# Patient Record
Sex: FEMALE | Race: BLACK OR AFRICAN AMERICAN | Employment: FULL TIME | URBAN - METROPOLITAN AREA
[De-identification: names, ages, dates, MRNs, and addresses within clinical notes are randomized per-mention and may not be internally consistent; named-entity substitution may affect disease eponyms.]

---

## 2017-12-18 ENCOUNTER — HOSPITAL ENCOUNTER (OUTPATIENT)
Dept: MAMMOGRAPHY | Age: 47
Discharge: HOME | End: 2017-12-18
Attending: OBSTETRICS & GYNECOLOGY | Admitting: OBSTETRICS & GYNECOLOGY

## 2017-12-18 DIAGNOSIS — Z12.31 VISIT FOR SCREENING MAMMOGRAM: ICD-10-CM

## 2019-02-05 ENCOUNTER — HOSPITAL ENCOUNTER (OUTPATIENT)
Dept: WOMENS IMAGING | Age: 49
Discharge: HOME | End: 2019-02-05
Payer: COMMERCIAL

## 2019-02-05 DIAGNOSIS — Z12.39 BREAST CANCER SCREENING: ICD-10-CM

## 2019-02-05 PROCEDURE — 77067 SCR MAMMO BI INCL CAD: CPT

## 2019-11-08 ENCOUNTER — OFFICE VISIT (OUTPATIENT)
Dept: SURGERY | Age: 49
End: 2019-11-08
Payer: COMMERCIAL

## 2019-11-08 VITALS
SYSTOLIC BLOOD PRESSURE: 118 MMHG | DIASTOLIC BLOOD PRESSURE: 80 MMHG | WEIGHT: 250 LBS | BODY MASS INDEX: 37.89 KG/M2 | HEIGHT: 68 IN

## 2019-11-08 DIAGNOSIS — L73.2 RIGHT AXILLARY HIDRADENITIS: Primary | ICD-10-CM

## 2019-11-08 PROCEDURE — 99213 OFFICE O/P EST LOW 20 MIN: CPT | Performed by: SURGERY

## 2019-11-08 RX ORDER — CLINDAMYCIN HYDROCHLORIDE 300 MG/1
300 CAPSULE ORAL 3 TIMES DAILY
COMMUNITY
End: 2021-06-09

## 2019-11-08 RX ORDER — CEPHALEXIN 500 MG/1
500 CAPSULE ORAL 3 TIMES DAILY
Qty: 21 CAPSULE | Refills: 3 | Status: SHIPPED | OUTPATIENT
Start: 2019-11-08 | End: 2019-11-15

## 2019-11-08 RX ORDER — GLIPIZIDE 2.5 MG/1
2.5 TABLET, EXTENDED RELEASE ORAL DAILY
COMMUNITY
End: 2021-06-09

## 2019-11-08 ASSESSMENT — ENCOUNTER SYMPTOMS
GASTROINTESTINAL NEGATIVE: 1
EYES NEGATIVE: 1
RESPIRATORY NEGATIVE: 1
ALLERGIC/IMMUNOLOGIC NEGATIVE: 1

## 2020-12-17 ENCOUNTER — HOSPITAL ENCOUNTER (OUTPATIENT)
Dept: WOMENS IMAGING | Age: 50
Discharge: HOME | End: 2020-12-17
Payer: COMMERCIAL

## 2020-12-17 PROCEDURE — 77063 BREAST TOMOSYNTHESIS BI: CPT

## 2021-01-05 ENCOUNTER — HOSPITAL ENCOUNTER (OUTPATIENT)
Dept: WOMENS IMAGING | Age: 51
Discharge: HOME | End: 2021-01-05
Payer: COMMERCIAL

## 2021-01-05 ENCOUNTER — HOSPITAL ENCOUNTER (OUTPATIENT)
Dept: ULTRASOUND IMAGING | Age: 51
End: 2021-01-05
Payer: COMMERCIAL

## 2021-01-05 DIAGNOSIS — R92.8 ABNORMAL MAMMOGRAM: ICD-10-CM

## 2021-01-05 PROCEDURE — G0279 TOMOSYNTHESIS, MAMMO: HCPCS

## 2021-02-10 ENCOUNTER — APPOINTMENT (OUTPATIENT)
Dept: GENERAL RADIOLOGY | Age: 51
End: 2021-02-10
Payer: COMMERCIAL

## 2021-02-10 ENCOUNTER — HOSPITAL ENCOUNTER (EMERGENCY)
Age: 51
Discharge: HOME | End: 2021-02-10
Payer: COMMERCIAL

## 2021-02-10 VITALS
DIASTOLIC BLOOD PRESSURE: 63 MMHG | TEMPERATURE: 98.3 F | OXYGEN SATURATION: 97 % | SYSTOLIC BLOOD PRESSURE: 106 MMHG | RESPIRATION RATE: 18 BRPM | HEART RATE: 79 BPM

## 2021-02-10 DIAGNOSIS — S93.402A SPRAIN OF LEFT ANKLE, UNSPECIFIED LIGAMENT, INITIAL ENCOUNTER: ICD-10-CM
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
## 2021-02-10 PROCEDURE — 73562 X-RAY EXAM OF KNEE 3: CPT

## 2021-02-10 PROCEDURE — 99284 EMERGENCY DEPT VISIT MOD MDM: CPT

## 2021-02-10 PROCEDURE — 73610 X-RAY EXAM OF ANKLE: CPT

## 2021-02-10 PROCEDURE — 6370000000 HC RX 637 (ALT 250 FOR IP): Performed by: PHYSICIAN ASSISTANT

## 2021-02-10 RX ORDER — HYDROCODONE BITARTRATE AND ACETAMINOPHEN 5; 325 MG/1; MG/1
2 TABLET ORAL ONCE
Status: COMPLETED | OUTPATIENT
Start: 2021-02-10 | End: 2021-02-10

## 2021-02-10 RX ORDER — ONDANSETRON 4 MG/1
4 TABLET, ORALLY DISINTEGRATING ORAL ONCE
Status: COMPLETED | OUTPATIENT
Start: 2021-02-10 | End: 2021-02-10

## 2021-02-10 RX ADMIN — ONDANSETRON 4 MG: 4 TABLET, ORALLY DISINTEGRATING ORAL at 10:36

## 2021-02-10 RX ADMIN — HYDROCODONE BITARTRATE AND ACETAMINOPHEN 2 TABLET: 5; 325 TABLET ORAL at 10:36

## 2021-02-10 ASSESSMENT — PAIN SCALES - GENERAL
PAINLEVEL_OUTOF10: 9
PAINLEVEL_OUTOF10: 9

## 2021-02-10 ASSESSMENT — ENCOUNTER SYMPTOMS
NAUSEA: 0
VOMITING: 0

## 2021-02-10 ASSESSMENT — PAIN DESCRIPTION - LOCATION: LOCATION: LEG

## 2021-02-10 NOTE — ED PROVIDER NOTES
[address] [facility]        Pt Name: Kristian Moreno  MRN: [id number]  [place] 1970  Date of evaluation: 2/10/2021  Provider: Dorian Magdaleno PA-C  PCP: Rehana Hough MD    BENITA. I have evaluated this patient. My supervising physician was available for consultation. 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 Twisted left ankle; pain from left ankle to left knee. Tingling & burning to site. Denies hitting her head; not on blood thinners        HISTORY OF PRESENT ILLNESS   (Location, Timing/Onset, Context/Setting, Quality, Duration, Modifying Factors, Severity, Associated Signs and Symptoms)  Note limiting factors. Kristian Moreno is a 45 y.o. female who presents to the emergency department today for evaluation for a fall which occurred shortly before arriving to the ED. The patient states that she was carrying a box, and she states that she slipped on ice, fell and twisted her left ankle. The patient states that she attempted to get up and then fell on her left knee. T the patient is complaining of most of her pain to her left ankle which she is rating as a 9/10. States that her pain is constant, is worse with touch and certain movements. The patient reports minimal pain to the knee. The patient states that she has barely been able to walk on the ankle secondary to pain. She has no numbness, tingling or weakness. The patient denies feeling dizzy, lightheaded, having chest pain or shortness of breath before her fall, her fall was mechanical in nature. The patient is not on any blood thinners. She did not hit her head, no loss of consciousness. No vomiting. No wounds or other complaints at this time    Nursing Notes were all reviewed and agreed with or any disagreements were addressed in the HPI.     REVIEW OF SYSTEMS (2-9 systems for level 4, 10 or more for level 5)     Review of Systems   Constitutional: Negative for activity change, appetite change and fever. Gastrointestinal: Negative for nausea and vomiting. Musculoskeletal: Positive for arthralgias. Skin: Negative for wound. Neurological: Negative for weakness and numbness. Positives and Pertinent negatives as per HPI. Except as noted above in the ROS, all other systems were reviewed and negative.        PAST MEDICAL HISTORY     Past Medical History:   Diagnosis Date    Diabetes [facility])          SURGICAL HISTORY     Past Surgical History:   Procedure Laterality Date    BREAST REDUCTION SURGERY       SECTION  80    CHOLECYSTECTOMY      OTHER SURGICAL HISTORY  2015    EXCISION OF HYDRADENITIS RIGHT AXILLA          [address]       Discharge Medication List as of 2/10/2021 12:38 PM      CONTINUE these medications which have NOT CHANGED    Details   metFORMIN (GLUCOPHAGE) 500 MG tablet Take 500 mg by mouth 2 times daily (with meals)Historical Med      aspirin 81 MG tablet Take 81 mg by mouth dailyHistorical Med      glipiZIDE (GLUCOTROL XL) 2.5 MG extended release tablet Take 2.5 mg by mouth dailyHistorical Med      ATORVASTATIN CALCIUM PO Take by mouthHistorical Med      VITAMIN D PO Take by mouthHistorical Med      Insulin Regular Human (NOVOLIN R IJ) Inject as directedHistorical Med      clindamycin (CLEOCIN) 300 MG capsule Take 300 mg by mouth 3 times dailyHistorical Med      minocycline (MINOCIN;DYNACIN) 100 MG capsule Take 100 mg by mouth 2 times daily      docusate sodium (COLACE) 100 MG capsule Take 100 mg by mouth 2 times daily      cephALEXin (KEFLEX) 500 MG capsule Take 1 capsule by mouth 3 times daily, Disp-21 capsule, R-0      Canagliflozin-Metformin HCl (INVOKAMET PO) Take by mouth      SitaGLIPtin-MetFORMIN HCl (JANUMET PO) Take by mouth               ALLERGIES     Diflucan [fluconazole] and Zithromax [azithromycin] Mental Status: She is alert and oriented to person, place, and time. Psychiatric:         Behavior: Behavior normal.         DIAGNOSTIC RESULTS   LABS:    Labs Reviewed - No data to display    All other labs were within normal range or not returned as of this dictation. EKG: All EKG's are interpreted by the Emergency Department Physician in the absence of a cardiologist.  Please see their note for interpretation of EKG. RADIOLOGY:   Non-plain film images such as CT, Ultrasound and MRI are read by the radiologist. Plain radiographic images are visualized and preliminarily interpreted by the ED Provider with the below findings:        Interpretation per the Radiologist below, if available at the time of this note:    XR ANKLE LEFT (MIN 3 VIEWS)   Final Result   Soft tissue swelling with tiny ossific density along the posterior aspect of   the distal tibia which may represent a tiny avulsed fracture fragment of the   posterior malleolus. Clinical correlation for focal point tenderness at this   site recommended. XR KNEE LEFT (3 VIEWS)   Preliminary Result   Small ossific density adjacent to the posterior margin of the tibial plateau   is nonspecific but could represent a chip or avulsion-type fracture in the   appropriate clinical setting. If there is clinical concern for internal   derangement consider further evaluation with MRI. No results found.         PROCEDURES   Unless otherwise noted below, none     Procedures    CRITICAL CARE TIME   N/A    CONSULTS:  None      EMERGENCY DEPARTMENT COURSE and DIFFERENTIAL DIAGNOSIS/MDM:   Vitals:    Vitals:    02/10/21 1018 02/10/21 1246   BP: 130/88 106/63   Pulse: 94 79   Resp: 18    Temp: 98.3 °F (36.8 °C)    TempSrc: Oral    SpO2: 100% 97%       Patient was given the following medications:  Medications   HYDROcodone-acetaminophen (NORCO) 5-325 MG per tablet 2 tablet (2 tablets Oral Given 2/10/21 1036) ondansetron (ZOFRAN-ODT) disintegrating tablet 4 mg (4 mg Oral Given 2/10/21 1036)           Briefly, this is a 58-year-old female who presents to the emergency department today for evaluation for a fall which occurred before arriving to the ED. Mechanical, slipped and ice. Complaining of pain to left ankle and left    On examination she does have tenderness and edema noted to the left ankle, no significant tenderness over the foot or the knee. She is neurovascularly intact    X-ray of left knee she has no acute process    Xray of left ankle was obtained and she has a soft tissue swelling with a tiny ossific density along the posterior aspect of the distal tibia which may represent a tiny avulsed fracture fragment of the posterior malleolus otherwise is unremarkable. Patient has no tenderness over this area, most of her tenderness is to the lateral malleolus. Shared medical decision making with the patient she is overall feeling better and she does elect for an Aircast, denies wanting crutches will refer to orthopedics if there is no improvement within the next week. She is to return to the ED for any new or worsening symptoms. Supportive care discussed at home. Patient voiced understanding is agreeable with plan. Stable for discharge. My suspicions at this time for occult fracture, dislocation, subluxation, arterial occlusion, septic arthritis or other emergent etiology      FINAL IMPRESSION      1. Fall, initial encounter    2.  Sprain of left ankle, unspecified ligament, initial encounter          DISPOSITION/PLAN   DISPOSITION Decision To Discharge 02/10/2021 12:35:07 PM      PATIENT REFERREDTO:  Reva Downs MD  [address] [place] [address]  [phone]    Schedule an appointment as soon as possible for a visit in 1 week  As needed    [facility] Emergency Department  [address] [facility]  [phone]    As needed, If symptoms worsen DISCHARGE MEDICATIONS:  Discharge Medication List as of 2/10/2021 12:38 PM          DISCONTINUED MEDICATIONS:  Discharge Medication List as of 2/10/2021 12:38 PM                 (Please note that portions of this note were completed with a voice recognition program.  Efforts were made to edit the dictations but occasionally words are mis-transcribed.)    Maureen Mckeon PA-C (electronically signed)            Maureen Mckeon PA-C  02/10/21 7133

## 2021-02-10 NOTE — ED NOTES
Pt presented to the ED reporting fall today in a parking lot. Pt denies hitting head, however reports that her leg went behind her, twisting her ankle and she landed on her knee. Imaging performed and patient mediated as ordered. Pt placed in a air boot as prescribed. Writer discussed discharge instructions as provided by ED provider. Denies further questions. Pt to leave ED in stable condition.      Jeremy Oscar RN  02/10/21 7869

## 2021-02-16 ENCOUNTER — OFFICE VISIT (OUTPATIENT)
Dept: ORTHOPEDIC SURGERY | Age: 51
End: 2021-02-16
Payer: COMMERCIAL

## 2021-02-16 VITALS
HEIGHT: 68 IN | TEMPERATURE: 97.3 F | DIASTOLIC BLOOD PRESSURE: 69 MMHG | WEIGHT: 250 LBS | BODY MASS INDEX: 37.89 KG/M2 | SYSTOLIC BLOOD PRESSURE: 117 MMHG

## 2021-02-16 DIAGNOSIS — S93.492A SPRAIN OF ANTERIOR TALOFIBULAR LIGAMENT OF LEFT ANKLE, INITIAL ENCOUNTER: Primary | ICD-10-CM

## 2021-02-16 DIAGNOSIS — S80.02XA CONTUSION OF LEFT KNEE, INITIAL ENCOUNTER: ICD-10-CM

## 2021-02-16 PROCEDURE — 99203 OFFICE O/P NEW LOW 30 MIN: CPT | Performed by: ORTHOPAEDIC SURGERY

## 2021-02-17 PROBLEM — S93.492A SPRAIN OF ANTERIOR TALOFIBULAR LIGAMENT OF LEFT ANKLE: Status: ACTIVE | Noted: 2021-02-17

## 2021-02-17 PROBLEM — S80.02XA CONTUSION OF LEFT KNEE: Status: ACTIVE | Noted: 2021-02-17

## 2021-02-17 NOTE — PROGRESS NOTES
CHIEF COMPLAINT:   1- Left ankle pain/ Ankle ATF sprain. 2- Left knee pain/ contusion. DATE OF INJURY: 2/10/2021, Worker's Comp    HISTORY:  Ms. Sara Troy 45 y.o.  female presents today for the first visit for evaluation of a left ankle and knee injury which occurred when she was walking at work parking lot and slipped and fell on her left knee and ankle. She is complaining of lateral ankle and anterior knee pain. She went to [facility] ER because of the pain. She was told that she has a sprain, splint was applied and asked to f/u with Orthopedics. She reports today moderate pain. Pain increase with walking and decrease with rest and elevation. No numbness or tingling sensation, and no other complaint.       Past Medical History:   Diagnosis Date    Diabetes [facility])         Past Surgical History:   Procedure Laterality Date    BREAST REDUCTION SURGERY       SECTION  80    CHOLECYSTECTOMY      OTHER SURGICAL HISTORY  2015    EXCISION OF HYDRADENITIS RIGHT AXILLA         Social History     Socioeconomic History    Marital status:      Spouse name: Not on file    Number of children: Not on file    Years of education: Not on file    Highest education level: Not on file   Occupational History    Not on file   Social Needs    Financial resource strain: Not on file    Food insecurity     Worry: Not on file     Inability: Not on file    Transportation needs     Medical: Not on file     Non-medical: Not on file   Tobacco Use    Smoking status: Never Smoker    Smokeless tobacco: Never Used   Substance and Sexual Activity    Alcohol use: No    Drug use: No    Sexual activity: Not on file   Lifestyle    Physical activity     Days per week: Not on file     Minutes per session: Not on file    Stress: Not on file   Relationships    Social connections     Talks on phone: Not on file     Gets together: Not on file     Attends Congregation service: Not on file     Active member of club or organization: Not on file     Attends meetings of clubs or organizations: Not on file     Relationship status: Not on file    Intimate partner violence     Fear of current or ex partner: Not on file     Emotionally abused: Not on file     Physically abused: Not on file     Forced sexual activity: Not on file   Other Topics Concern    Not on file   Social History Narrative    Not on file        Family History   Problem Relation Age of Onset    Diabetes Mother     High Blood Pressure Mother     Diabetes Father     Cancer Father         multiple myeloma    High Blood Pressure Father         Pertinent items are noted in HPI  Review of systems reviewed from Patient History Form dated on 2/16/2021 and available in the patient's chart under the Media tab. No change. PHYSICAL EXAM:  Ms. Chloé Collier is a very pleasant 45 y.o.  female who presents today in no acute distress, awake, alert, and oriented. She is well dressed, nourished and  groomed. Patient with normal affect. Height is  5' 8\" (1.727 m), weight is 250 lb (113.4 kg). Resting respiratory rate is 16. Examination of the gait, showed that the patient walks with a limp, PWB left leg in a boot. Examination of both ankles showing a decreased range of motion of the left ankle and knee compare to the other side because of pain. There is mild swelling that can be seen, as well as ecchymosis over lateral side of the left ankle. She has intact sensation and good pedal pulses. She has significant tenderness on deep palpation over the left ankle ATF. The ankles are stable to drawer test bilaterally, equally.  Ankle reflex 1+ bilaterally. IMAGING:  Xray's dated 2/10/2021 from [address] [place] [address],  were reviewed, 3 views of the left ankle and knee, and showed no acute fracture. Ankle mortise in anatomic position. IMPRESSION:    1- Left ankle pain/ Ankle ATF sprain. 2- Left knee pain/ contusion.     PLAN:  I assured the patient that the xray is negative for acute fracture. The xray findings were reviewed with the patient and explained to her that the pain is 2ry to ankle sprain and knee contusion, and that it should continue to improve the next 3-4 weeks. She can be WBAT out of the boot, and avoid heavy impact acitivity and work on peroneal and Quad muscle strength. F/U in 3 weeks, PT if needed.        Jw Zamarripa MD

## 2021-03-09 ENCOUNTER — OFFICE VISIT (OUTPATIENT)
Dept: ORTHOPEDIC SURGERY | Age: 51
End: 2021-03-09
Payer: COMMERCIAL

## 2021-03-09 VITALS
WEIGHT: 250 LBS | BODY MASS INDEX: 37.89 KG/M2 | HEIGHT: 68 IN | TEMPERATURE: 98.4 F | DIASTOLIC BLOOD PRESSURE: 72 MMHG | HEART RATE: 66 BPM | SYSTOLIC BLOOD PRESSURE: 129 MMHG

## 2021-03-09 DIAGNOSIS — S80.02XA CONTUSION OF LEFT KNEE, INITIAL ENCOUNTER: ICD-10-CM

## 2021-03-09 DIAGNOSIS — S93.492A SPRAIN OF ANTERIOR TALOFIBULAR LIGAMENT OF LEFT ANKLE, INITIAL ENCOUNTER: Primary | ICD-10-CM

## 2021-03-09 PROCEDURE — 99214 OFFICE O/P EST MOD 30 MIN: CPT | Performed by: ORTHOPAEDIC SURGERY

## 2021-03-09 NOTE — PROGRESS NOTES
CHIEF COMPLAINT:   1- Left ankle pain/ Ankle ATF sprain. 2- Left knee pain/ contusion. DATE OF INJURY: 2/10/2021, Worker's Comp    HISTORY:  Ms. Lacho Cisneros 45 y.o.  female presents today for follow-up visit for evaluation of a left ankle and knee injury which occurred when she was walking at work parking lot and slipped and fell on her left knee and ankle. She is complaining of lateral ankle and anterior knee pain and states that is somewhat better. She states pain is worse when walking down steps and improves with ice. She went to [facility] ER because of the pain. She was told that she has a sprain, splint was applied and asked to f/u with Orthopedics. She reports today mild to moderate pain. Pain increase with walking and decrease with rest and elevation. Treatment to date has consisted of rest, ice. She has had no formal physical therapy. No numbness or tingling sensation, and no other complaint. She has been back to work full duty.     Past Medical History:   Diagnosis Date    Diabetes [facility])         Past Surgical History:   Procedure Laterality Date    BREAST REDUCTION SURGERY       SECTION  80    CHOLECYSTECTOMY      OTHER SURGICAL HISTORY  2015    EXCISION OF HYDRADENITIS RIGHT AXILLA         Social History     Socioeconomic History    Marital status:      Spouse name: Not on file    Number of children: Not on file    Years of education: Not on file    Highest education level: Not on file   Occupational History    Not on file   Social Needs    Financial resource strain: Not on file    Food insecurity     Worry: Not on file     Inability: Not on file    Transportation needs     Medical: Not on file     Non-medical: Not on file   Tobacco Use    Smoking status: Never Smoker    Smokeless tobacco: Never Used   Substance and Sexual Activity    Alcohol use: No    Drug use: No    Sexual activity: Not on file   Lifestyle    Physical activity     Days per week: Not on file     Minutes per session: Not on file    Stress: Not on file   Relationships    Social connections     Talks on phone: Not on file     Gets together: Not on file     Attends Orthodox service: Not on file     Active member of club or organization: Not on file     Attends meetings of clubs or organizations: Not on file     Relationship status: Not on file    Intimate partner violence     Fear of current or ex partner: Not on file     Emotionally abused: Not on file     Physically abused: Not on file     Forced sexual activity: Not on file   Other Topics Concern    Not on file   Social History Narrative    Not on file        Family History   Problem Relation Age of Onset    Diabetes Mother     High Blood Pressure Mother     Diabetes Father     Cancer Father         multiple myeloma    High Blood Pressure Father         Pertinent items are noted in HPI  Review of systems reviewed from Patient History Form dated on 2/16/2021 and available in the patient's chart under the Media tab. No change. PHYSICAL EXAM:  Ms. Kael Hung is a very pleasant 45 y.o.  female who presents today in no acute distress, awake, alert, and oriented. She is well dressed, nourished and  groomed. Patient with normal affect. Height is  5' 8\" (1.727 m), weight is 250 lb (113.4 kg). Resting respiratory rate is 16. Examination of the gait, showed that the patient walks with no limp, WB left leg. Examination of both ankles showing a full range of motion of the left ankle and full range of motion left knee compare to the other side. There is mild to no swelling that can be seen, no ecchymosis over lateral side of the left ankle. She has intact sensation and good pedal pulses. She has no tenderness on deep palpation over the left ankle ATF. The ankles are stable to drawer test bilaterally, equally.  Ankle reflex 1+ bilaterally. She has no tenderness to palpation over the left knee.   Knee is stable to valgus and varus stress. IMAGING:  Xray's dated 2/10/2021 from [facility],  were reviewed, 3 views of the left ankle and knee, and showed no acute fracture. Ankle mortise in anatomic position. IMPRESSION:    1- Left ankle pain/ Ankle ATF sprain. 2- Left knee pain/ contusion. PLAN:  I assured the patient that the xray is negative for acute fracture. The xray findings were reviewed with the patient and explained to her that the pain is 2ry to ankle sprain and knee contusion, and that it should continue to improve. She can be WBAT  and gradually return to normal acitivity and work on peroneal and Quad muscle strength. I discussed with the patient that I think that she would really benefit from a course of physical therapy for further strengthening and stretching. An Rx for physical therapy was given to the patient. F/U in 6 weeks. MRI if needed.        Price Saini MD

## 2021-03-18 ENCOUNTER — HOSPITAL ENCOUNTER (OUTPATIENT)
Dept: PHYSICAL THERAPY | Age: 51
Setting detail: THERAPIES SERIES
Discharge: HOME | End: 2021-03-18
Payer: COMMERCIAL

## 2021-03-18 PROCEDURE — 97110 THERAPEUTIC EXERCISES: CPT

## 2021-03-18 PROCEDURE — 97530 THERAPEUTIC ACTIVITIES: CPT

## 2021-03-18 PROCEDURE — 97161 PT EVAL LOW COMPLEX 20 MIN: CPT

## 2021-03-18 NOTE — PLAN OF CARE
Cesar, [facility], [address]  Phone: [phone]   Fax: [phone]                                                       Physical Therapy Certification    Dear Referring Practitioner: Solange Goodson MD,    We had the pleasure of evaluating the following patient for physical therapy services at [address] [place] [address]. A summary of our findings can be found in the initial assessment below. This includes our plan of care. If you have any questions or concerns regarding these findings, please do not hesitate to contact me at the office phone number checked above. Thank you for the referral.       Physician Signature:_______________________________Date:__________________  By signing above (or electronic signature), therapists plan is approved by physician      Patient: Fabián Rodriguez   : 1970   MRN: [id number]  Referring Physician: Referring Practitioner: Solange Goodson MD      Evaluation Date: 3/18/2021      Medical Diagnosis Information:  Diagnosis: T33.585A (ICD-10-CM) - Sprain of anterior talofibular ligament of left ankle, initial encounter S80. 02XA (ICD-10-CM) - Contusion of left knee, initial encounter   Treatment Diagnosis: Left knee pain, Left ankle pain, decreased ROM, Decreased strength                                         Insurance information: PT Insurance Information: Workers comp [employer] and [employer] [place]     Precautions/ Contra-indications: DM  Latex Allergy:  [x]NO      []YES  Preferred Language for Healthcare:   [x]English       []Other:    C-SSRS Triggered by Intake questionnaire (Past 2 wk assessment ):   [x] No, Questionnaire did not trigger screening.   [] Yes, Patient intake triggered C-SSRS Screening     [] Completed, no further action required.    [] Completed, PCP notified via Epic    SUBJECTIVE: Patient stated complaint: Pt states that she suffered a fall back in February that resulted in pt hitting her knee, upon trying to get up pt fell again and twisted her ankle in the process. Pt reports stiffness in the morning but starts to resolve after moving it and walking dog. Pt reports the pain is localized and does not travel past ankle or knee. Pain is described as aching in joints with stiffness and sharp occasionally. Pt states going down the stairs is particularly bothersome. Relevant Medical History: DM  Functional Outcome: LEFS: raw score =58 ; dysfunction = 27.5%    Pain Scale:2/10  Easing factors: Ice, rest, tylenol, ibuprofen   Provocative factors:  Movement, deep flexion      Type: []Constant   [x]Intermittent  []Radiating [x]Localized []other:     Numbness/Tingling: denies    Occupation/School:  Case management sitting at desk     Living Status/Prior Level of Function:Prior to this injury / incident, pt was independent with ADLs and IADLs,      OBJECTIVE:   Palpation: presence of swelling in L ankle, L knee tender to palpation and during MMT    Bandages/Dressings/Incisions: N/A    Gait: (include devices/WB status) Presents with toe out navicular drop bilaterally, valgus knee, lateral hip sway    Dermatomes Normal Abnormal Comments   inguinal area (L1)       anterior mid-thigh (L2)      distal ant thigh/med knee (L3)  x Increased on L   medial lower leg and foot (L4) x     lateral lower leg and foot (L5) x     posterior calf (S1) x     medial calcaneus (S2)  x Increased L       Reflexes Normal Abnormal Comments   S1-2 Seated achilles  x diminished   S1-2 Prone knee bend  x diminished   L3-4 Patellar tendon  x diminished   Clonus x     Babinski        Lumbar AROM screen: [] WFL  [] abnormal:     PROM AROM    L R L R   Hip Flexion       Hip Abduction       Hip ER       Hip IR       Knee Flexion   115  127   Knee Extension   1 deg hyperext 2 deg hyperext   Dorsiflexion    4    Plantarflexion    20    Inversion    20    Eversion    11 Strength (0-5) / Myotomes Left Right   Hip Flexion - supine     Hip Flexion - seated (L1-2) 5 5   Hip Abduction     Hip Adduction     Hip ER     Hip IR     Quads (L2-4) 4+ pain 5   Hamstrings 4 discomfort 5   Ankle Dorsiflexion (L4-5) 5 5   Ankle Plantarflexion (S1-2) 5 5   Ankle Inversion     Ankle Eversion (S1-2)     Great Toe Extension (L5)          Flexibility     Hamstrings (90/90)     ITB (Paxton)     Quads (Ely's)     Hip Flexor No [place])     gastroc Lacks 5 deg fromneutral (measured to first tissue barrier)    Girth     Mid patella     Suprapatellar     Figure 8     Transmalleolar     Metatarsal Heads         Joint mobility: L ankle and L knee    []Normal    [x]Hypo   []Hyper    Orthopaedic Special Tests  Positive  Negative  NT Comments    Hip       LIBORIO / Kenny's       FADIR       Scour       Trendelenburg              Knee       Lachman's / Anterior Drawer  x  L knee   Posterior Drawer  x  L knee   Varus Stress  x  L knee   Valgus Stress  x  L knee   Eze's   x  L knee   Appley's       Thessaly's       Patellar Tracking              Ankle       Anterior Drawer       Talar Tilt       Verduzco       Hermilo's                                        [x] Patient history, allergies, meds reviewed. Medical chart reviewed. See intake form. Review Of Systems (ROS):  [x]Performed Review of systems (Integumentary, CardioPulmonary, Neurological) by intake and observation. Intake form has been scanned into medical record. Patient has been instructed to contact their primary care physician regarding ROS issues if not already being addressed at this time.       Co-morbidities/Complexities (which will affect course of rehabilitation):  []None        []Hx of COVID   Arthritic conditions   []Rheumatoid arthritis (M05.9)  []Osteoarthritis (M19.91)  []Gout   Cardiovascular conditions   []Hypertension (I10)  []Hyperlipidemia (E78.5)  []Angina pectoris (I20)  []Atherosclerosis (I70)  []Pacemaker  []Hx of CABG/stent/ cardiac surgeries   Musculoskeletal conditions   []Disc pathology   []Congenital spine pathologies   []Osteoporosis (M81.8)  []Osteopenia (M85.8)  []Scoliosis       Endocrine conditions   []Hypothyroid (E03.9)  []Hyperthyroid Gastrointestinal conditions   []Constipation (E49.66)   Metabolic conditions   []Morbid obesity (E66.01)  [x]Diabetes type 1(E10.65) or 2 (E11.65)   []Neuropathy (G60.9)     Cardio/Pulmonary conditions   []Asthma (J45)  []Coughing   []COPD (J44.9)  []CHF  []A-fib   Psychological Disorders  []Anxiety (F41.9)  []Depression (F32.9)   []Other:   Developmental Disorders  []Autism (F84.0)  []CP (G80)  []Down Syndrome (Q90.9)  []Developmental delay     Neurological conditions  []Prior Stroke (I69.30)  []Parkinson's (G20)  []Encephalopathy (G93.40)  []MS (G35)  []Post-polio (G14)  []SCI  []TBI  []ALS Other conditions  []Fibromyalgia (M79.7)  []Vertigo  []Syncope  []Kidney Failure  []Cancer      []currently undergoing                treatment  []Pregnancy  []Incontinence   Prior surgeries  []involved limb  []previous spinal surgery  [] section birth  []hysterectomy  []bowel / bladder surgery  []other relevant surgeries   []Other:              Barriers to/and or personal factors that will affect rehab potential:              []Age  []Sex    []Smoker              []Motivation/Lack of Motivation                        []Co-Morbidities              []Cognitive Function, education/learning barriers              []Environmental, home barriers              [x]profession/work barriers  []past PT/medical experience  []other:  Justification: Pt is a  that requires her to be seated for multiple hours per day. Falls Risk Assessment (30 days):   [x] Falls Risk assessed and no intervention required.   [] Falls Risk assessed and Patient requires intervention due to being higher risk   TUG score (>12s at risk):     [] Falls education provided, including        ASSESSMENT: Pt presents with L LE pain in both knee and ankle after suffering a fall about 1 month ago when walking and slipped on black ice. Pt has presence of swelling in L ankle and reduced ROM and strength secondary to pain and swelling. Pt presents with reduced AROM in L knee and impaired strength secondary to pain. Pt will benefit from skilled therapy to address deficits and return to previous level of function so that she may navigate stairs without increase in symptoms or compensation. Functional Impairments:     []Noted lumbar/proximal hip/LE joint hypomobility   [x]Decreased LE functional ROM   []Decreased core/proximal hip strength and neuromuscular control   [x]Decreased LE functional strength   []Reduced balance/proprioceptive control   []other:      Functional Activity Limitations (from functional questionnaire and intake)   []Reduced ability to tolerate prolonged functional positions   []Reduced ability or difficulty with changes of positions or transfers between positions   [x]Reduced ability to maintain good posture and demonstrate good body mechanics with sitting, bending, and lifting   [x]Reduced ability to sleep   [] Reduced ability or tolerance with driving and/or computer work   []Reduced ability to perform lifting, carrying tasks   [x]Reduced ability to squat   []Reduced ability to forward bend   [x]Reduced ability to ambulate prolonged functional periods/distances/surfaces   [x]Reduced ability to ascend/descend stairs   [x]Reduced ability to run, hop, cut or jump   []other:    Participation Restrictions   []Reduced participation in self care activities   [x]Reduced participation in home management activities   []Reduced participation in work activities   []Reduced participation in social activities. [x]Reduced participation in sport/recreation activities. Classification :    []Signs/symptoms consistent with post-surgical status including decreased ROM, strength and function.    [x]Signs/symptoms consistent with joint sprain/strain   []Signs/symptoms consistent with patella-femoral syndrome   []Signs/symptoms consistent with knee OA/hip OA   []Signs/symptoms consistent with internal derangement of knee/Hip   []Signs/symptoms consistent with functional hip weakness/NMR control      []Signs/symptoms consistent with tendinitis/tendinosis    []signs/symptoms consistent with pathology which may benefit from Dry needling      [x]other: L ankle sprain, L knee contusion and sprain     Prognosis/Rehab Potential:      []Excellent   [x]Good    []Fair   []Poor    Tolerance of evaluation/treatment:    []Excellent   [x]Good    []Fair   []Poor    Physical Therapy Evaluation Complexity Justification  [x] A history of present problem with:  [] no personal factors and/or comorbidities that impact the plan of care;  [x]1-2 personal factors and/or comorbidities that impact the plan of care  []3 personal factors and/or comorbidities that impact the plan of care  [x] An examination of body systems using standardized tests and measures addressing any of the following: body structures and functions (impairments), activity limitations, and/or participation restrictions;:  [] a total of 1-2 or more elements   [x] a total of 3 or more elements   [] a total of 4 or more elements   [x] A clinical presentation with:  [x] stable and/or uncomplicated characteristics   [] evolving clinical presentation with changing characteristics  [] unstable and unpredictable characteristics;   [x] Clinical decision making of [x] Low, [] moderate, [] high complexity using standardized patient assessment instrument and/or measurable assessment of functional outcome.     [x] EVAL (LOW) 33134 (typically 15 minutes face-to-face)  [] EVAL (MOD) 32285 (typically 30 minutes face-to-face)  [] EVAL (HIGH) 21485 (typically 45 minutes face-to-face)  [] RE-EVAL     PLAN:   Frequency/Duration:  1-2 days per week for 4-6 Weeks:  Interventions:  [x]  Therapeutic exercise including: strength training, ROM, for Lower extremity and core   [x]  NMR activation and proprioception for LE, Glutes and Core   [x]  Manual therapy as indicated for LE, Hip and spine to include: Dry Needling/IASTM, STM, PROM, Gr I-IV mobilizations, manipulation. [x] Modalities as needed that may include: thermal agents, E-stim, Biofeedback, US, iontophoresis as indicated  [x] Patient education on joint protection, postural re-education, activity modification, progression of HEP. HEP instruction: Written HEP instructions provided and reviewed. GOALS:  Patient stated goal:  Exercising regularly, return to walking longer distances, and wearing heels   [] Progressing: [] Met: [] Not Met: [] Adjusted    Therapist goals for Patient:   Short Term Goals: To be achieved in: 2 weeks  1. Independent in HEP and progression per patient tolerance, in order to prevent re-injury. [] Progressing: [] Met: [] Not Met: [] Adjusted  2. Patient will have a decrease in pain to facilitate improvement in movement, function, and ADLs as indicated by Functional Deficits. [] Progressing: [] Met: [] Not Met: [] Adjusted    Long Term Goals: To be achieved in: 6 weeks  1. Disability index score of 15% or less for the LEFS to assist with reaching prior level of function. [] Progressing: [] Met: [] Not Met: [] Adjusted  2. Patient will demonstrate increased knee AROM to 120 deg or better to allow for proper joint functioning as indicated by patients Functional Deficits. [] Progressing: [] Met: [] Not Met: [] Adjusted  3. Patient will demonstrate an increase in Strength to at least 4+/5 as well as good proximal hip strength and control to allow for proper functional mobility as indicated by patients Functional Deficits. [] Progressing: [] Met: [] Not Met: [] Adjusted  4. Patient will return to functional activities including stair navigation without increased symptoms or restriction. [] Progressing: [] Met: [] Not Met: [] Adjusted  5.  Patient will return to regular exercise including ambulating greater than 30min without increase in symptoms or restriction. [] Progressing: [] Met: [] Not Met: [] Adjusted     Electronically signed by:  Ron Casas, PT [id number] DPT  Sen[facility] Bryan, [facility]    Therapist was present, directed the patient's care, made skilled judgement, and was responsible for assessment and treatment of the patient.

## 2021-03-18 NOTE — FLOWSHEET NOTE
Derik Guerrero  Phone: [phone]   Fax: [phone]    Physical Therapy Treatment Note/ Progress Report:     Date:  3/18/2021    Patient Name:  Megan Townsend    :  1970  MRN: [id number]  Restrictions/Precautions:    Medical/Treatment Diagnosis Information:  · Diagnosis: I48.276A (ICD-10-CM) - Sprain of anterior talofibular ligament of left ankle, initial encounter S80. 02XA (ICD-10-CM) - Contusion of left knee, initial encounter  · Treatment Diagnosis: Left knee pain, Left ankle pain, decreased ROM, Decreased strength  Insurance/Certification information:  PT Insurance Information: Workers comp [employer] and [employer] [place]  Physician Information:  Referring Practitioner: Christel Danielson MD  Plan of care signed (Y/N): []  Yes [x]  No     Date of Patient follow up with Physician:      Progress Report: []  Yes  [x]  No     Date Range for reporting period:  Beginning: 3/18/21  Ending:     Progress report due (10 Rx/or 30 days whichever is less): visit #10 or   (date)     Recertification due (POC duration/ or 90 days whichever is less): visit #12 or 21 (date)     Visit # Insurance Allowable Auth required?  Date Range    10 []  Yes  [x]  No      Latex Allergy:  [x]NO      []YES  Preferred Language for Healthcare:   [x]English       []other:    Functional Scale:        Date assessed:  LEFS: raw score = 58; dysfunction = 27.5%   3/18/21    Pain level: 2/10     SUBJECTIVE:  See eval    OBJECTIVE: See eval      RESTRICTIONS/PRECAUTIONS: hx of DM    Exercises/Interventions:     Therapeutic Exercise (36557)  Resistance / level Sets/sec Reps Notes / Cues   Long sit calf stretch with towel   30s 2    Sidelying quad stretch with towel  30s 2     TB plantarflexion  Peach band 1 10    TB dorsiflexion  Peach band 1 10    Eversion AROM  1 10    Inversion AROM  1 10                                Therapeutic Activities (24995)       Patient education Neuromuscular Re-ed (66018)                            Manual Intervention (29742)       Knee mobs/PROM       Tib/Fem Mobs       Patella Mobs       Ankle mobs                         Modalities:     Pt. Education:  3/18 Pt educated re pros and cons of over the counter orthotics to improve alignment of ankles and knees  -pt educated on diagnosis, prognosis and expectations for rehab  -all pt questions were answered    Home Exercise Program:  3/18 Access Code: Nicki Peak: [address] [employer]/  Date: 03/18/2021  Prepared by: Bishop Acosta Sitting Calf Stretch with Strap - 2 x daily - 7 x weekly - 1 sets - 3 reps - 30 hold   Long Sitting Ankle Plantar Flexion with Resistance - 2 x daily - 7 x weekly - 1-2 sets - 10 reps - 1 hold   Ankle Dorsiflexion with Resistance - 2 x daily - 7 x weekly - 1-2 sets - 10 reps - 1 hold   Seated Ankle Inversion with Resistance and Legs Crossed - 2 x daily - 7 x weekly - 1 sets - 10 reps - 1 hold   Long Sitting Ankle Eversion with Resistance - 2 x daily - 7 x weekly - 1-2 sets - 10 reps - 1 hold   Sidelying Quadriceps Stretch - 2 x daily - 7 x weekly - 1 sets - 3 reps - 30 hold      Therapeutic Exercise and NMR EXR  [x] (49351) Provided verbal/tactile cueing for activities related to strengthening, flexibility, endurance, ROM for improvements in LE, proximal hip, and core control with self care, mobility, lifting, ambulation.  [] (86064) Provided verbal/tactile cueing for activities related to improving balance, coordination, kinesthetic sense, posture, motor skill, proprioception  to assist with LE, proximal hip, and core control in self care, mobility, lifting, ambulation and eccentric single leg control.   [] (74041) Therapist is in constant attendance of 2 or more patients providing skilled therapy interventions, but not providing any significant amount of measurable one-on-one time to either patient, for improvements in LE, proximal hip, and core control in self care, mobility, lifting, ambulation and eccentric single leg control. NMR and Therapeutic Activities:    [] (26545 or 39349) Provided verbal/tactile cueing for activities related to improving balance, coordination, kinesthetic sense, posture, motor skill, proprioception and motor activation to allow for proper function of core, proximal hip and LE with self care and ADLs  [] (62633) Gait Re-education- Provided training and instruction to the patient for proper LE, core and proximal hip recruitment and positioning and eccentric body weight control with ambulation re-education including up and down stairs     Home Exercise Program:    [x] (22196) Reviewed/Progressed HEP activities related to strengthening, flexibility, endurance, ROM of core, proximal hip and LE for functional self-care, mobility, lifting and ambulation/stair navigation   [] (51624)Reviewed/Progressed HEP activities related to improving balance, coordination, kinesthetic sense, posture, motor skill, proprioception of core, proximal hip and LE for self care, mobility, lifting, and ambulation/stair navigation      Manual Treatments:  PROM / STM / Oscillations-Mobs:  G-I, II, III, IV (PA's, Inf., Post.)  [] (83017) Provided manual therapy to mobilize LE, proximal hip and/or LS spine soft tissue/joints for the purpose of modulating pain, promoting relaxation,  increasing ROM, reducing/eliminating soft tissue swelling/inflammation/restriction, improving soft tissue extensibility and allowing for proper ROM for normal function with self care, mobility, lifting and ambulation. Modalities:  [] (96769) Vasopneumatic compression: Utilized vasopneumatic compression to decrease edema / swelling for the purpose of improving mobility and quad tone / recruitment which will allow for increased overall function including but not limited to self-care, transfers, ambulation, and ascending / descending stairs.        Charges:  Timed Code Treatment Minutes: 30   Total Treatment Minutes: 60     [x] EVAL - LOW (75242)   [] EVAL - MOD (20128)  [] EVAL - HIGH (82541)  [] RE-EVAL (74108)  [x] VU(98787) x 1      [] Ionto  [] NMR (97984) x       [] Vaso  [] Manual (42076) x       [] Ultrasound  [x] TA x 1       [] Mech Traction (20165)  [] Aquatic Therapy x     [] ES (un) (40537):   [] Home Management Training x  [] ES(attended) (66004)   [] Dry Needling 1-2 muscles (89809):  [] Dry Needling 3+ muscles (974793  [] Group:      [] Other:     GOALS:  Patient stated goal:  Exercising regularly, return to walking longer distances, and wearing heels   []? Progressing: []? Met: []? Not Met: []? Adjusted     Therapist goals for Patient:   Short Term Goals: To be achieved in: 2 weeks  1. Independent in HEP and progression per patient tolerance, in order to prevent re-injury. []? Progressing: []? Met: []? Not Met: []? Adjusted  2. Patient will have a decrease in pain to facilitate improvement in movement, function, and ADLs as indicated by Functional Deficits. []? Progressing: []? Met: []? Not Met: []? Adjusted     Long Term Goals: To be achieved in: 6 weeks  1. Disability index score of 15% or less for the LEFS to assist with reaching prior level of function. []? Progressing: []? Met: []? Not Met: []? Adjusted  2. Patient will demonstrate increased knee AROM to 120 deg or better to allow for proper joint functioning as indicated by patients Functional Deficits. []? Progressing: []? Met: []? Not Met: []? Adjusted  3. Patient will demonstrate an increase in Strength to at least 4+/5 as well as good proximal hip strength and control to allow for proper functional mobility as indicated by patients Functional Deficits. []? Progressing: []? Met: []? Not Met: []? Adjusted  4. Patient will return to functional activities including stair navigation without increased symptoms or restriction. []? Progressing: []? Met: []? Not Met: []? Adjusted  5.  Patient will return to regular exercise including ambulating greater than 30min without increase in symptoms or restriction. []? Progressing: []? Met: []? Not Met: []? Adjusted         Overall Progression Towards Functional goals/ Treatment Progress Update:  [] Patient is progressing as expected towards functional goals listed. [] Progression is slowed due to complexities/Impairments listed. [] Progression has been slowed due to co-morbidities. [x] Plan just implemented, too soon to assess goals progression <30days   [] Goals require adjustment due to lack of progress  [] Patient is not progressing as expected and requires additional follow up with physician  [] Other    Persisting Functional Limitations/Impairments:  []Sitting []Standing   [x]Walking [x]Stairs   []Transfers [x]ADLs   [x]Squatting/bending [x]Kneeling  [x]Housework []Job related tasks  []Driving []Sports/Recreation   [x]Sleeping []Other:    ASSESSMENT:  See eval  Treatment/Activity Tolerance:  [x] Pt able to complete treatment [] Patient limited by fatique  [] Patient limited by pain  [] Patient limited by other medical complications  [] Other:     Prognosis: [x] Good [] Fair  [] Poor    Patient Requires Follow-up: [x] Yes  [] No    Return to Play:    [x]  N/A   []  Stage 1: Intro to Strength   []  Stage 2: Return to Run and Strength   []  Stage 3: Return to Jump and Strength   []  Stage 4: Dynamic Strength and Agility   []  Stage 5: Sport Specific Training     []  Ready to Return to Play, Meets All Above Stages   []  Not Ready for Return to Sports   Comments:            PLAN: See eval. PT 1-2x / week for 4-6 weeks.    [] Continue per plan of care [] Alter current plan (see comments)  [x] Plan of care initiated [] Hold pending MD visit [] Discharge    Electronically signed by: Yao Ferrara PT, DPT    Ellis Byrne SPT    Therapist was present, directed the patient's care, made skilled judgement, and was responsible for assessment and treatment of the patient. Note: If patient does not return for scheduled/ recommended follow up visits, this note will serve as a discharge from care along with most recent update on progress.

## 2021-03-26 ENCOUNTER — HOSPITAL ENCOUNTER (OUTPATIENT)
Dept: PHYSICAL THERAPY | Age: 51
Setting detail: THERAPIES SERIES
Discharge: HOME | End: 2021-03-26
Payer: COMMERCIAL

## 2021-03-26 PROCEDURE — 97140 MANUAL THERAPY 1/> REGIONS: CPT | Performed by: PHYSICAL THERAPIST

## 2021-03-26 PROCEDURE — 97110 THERAPEUTIC EXERCISES: CPT | Performed by: PHYSICAL THERAPIST

## 2021-03-26 PROCEDURE — 97112 NEUROMUSCULAR REEDUCATION: CPT | Performed by: PHYSICAL THERAPIST

## 2021-03-26 NOTE — FLOWSHEET NOTE
plantarflexion  Blue 2 10    TB dorsiflexion  Blue 2 10    Eversion AROM  2 10 Peach band NPV   Inversion AROM  2 10 Peach band NPV                               Therapeutic Activities (65600)  Start time:  End time:                                    Neuromuscular Re-ed (48058)  Start time: 3:10 End time: 3:19       Doming   2 10 Cueing to rotate ankle without moving knee   SL balance (shoe off) Flat ground 30s 2 Cueing for alignment and doming    Towel toe crunches   2 10           Manual Intervention (81965)  Start time: 2:55 End time: 3:10       Knee mobs/PROM       Tib/Fem Mobs       Patella Mobs  3'     Ankle mobs       STM patella tendon and med/lat ankle  12'                Modalities:     Pt. Education:  3/18 Pt educated re pros and cons of over the counter orthotics to improve alignment of ankles and knees  -pt educated on diagnosis, prognosis and expectations for rehab  -all pt questions were answered    Home Exercise Program:  3/18 Access Code: [email or web address]: [employer]/  Date: 03/18/2021  Prepared by: Alejandra Shetty Sitting Calf Stretch with Strap - 2 x daily - 7 x weekly - 1 sets - 3 reps - 30 hold   Long Sitting Ankle Plantar Flexion with Resistance - 2 x daily - 7 x weekly - 1-2 sets - 10 reps - 1 hold   Ankle Dorsiflexion with Resistance - 2 x daily - 7 x weekly - 1-2 sets - 10 reps - 1 hold   Seated Ankle Inversion with Resistance and Legs Crossed - 2 x daily - 7 x weekly - 1 sets - 10 reps - 1 hold   Long Sitting Ankle Eversion with Resistance - 2 x daily - 7 x weekly - 1-2 sets - 10 reps - 1 hold   Sidelying Quadriceps Stretch - 2 x daily - 7 x weekly - 1 sets - 3 reps - 30 hold      Therapeutic Exercise and NMR EXR  [x] (54469) Provided verbal/tactile cueing for activities related to strengthening, flexibility, endurance, ROM for improvements in LE, proximal hip, and core control with self care, mobility, lifting, ambulation.  [] (29183) Provided verbal/tactile cueing for activities related to improving balance, coordination, kinesthetic sense, posture, motor skill, proprioception  to assist with LE, proximal hip, and core control in self care, mobility, lifting, ambulation and eccentric single leg control.   [] (70106) Therapist is in constant attendance of 2 or more patients providing skilled therapy interventions, but not providing any significant amount of measurable one-on-one time to either patient, for improvements in LE, proximal hip, and core control in self care, mobility, lifting, ambulation and eccentric single leg control.      NMR and Therapeutic Activities:    [x] (39462 or 48052) Provided verbal/tactile cueing for activities related to improving balance, coordination, kinesthetic sense, posture, motor skill, proprioception and motor activation to allow for proper function of core, proximal hip and LE with self care and ADLs  [] (01321) Gait Re-education- Provided training and instruction to the patient for proper LE, core and proximal hip recruitment and positioning and eccentric body weight control with ambulation re-education including up and down stairs     Home Exercise Program:    [x] (21678) Reviewed/Progressed HEP activities related to strengthening, flexibility, endurance, ROM of core, proximal hip and LE for functional self-care, mobility, lifting and ambulation/stair navigation   [] (09801)Reviewed/Progressed HEP activities related to improving balance, coordination, kinesthetic sense, posture, motor skill, proprioception of core, proximal hip and LE for self care, mobility, lifting, and ambulation/stair navigation      Manual Treatments:  PROM / STM / Oscillations-Mobs:  G-I, II, III, IV (PA's, Inf., Post.)  [x] (89396) Provided manual therapy to mobilize LE, proximal hip and/or LS spine soft tissue/joints for the purpose of modulating pain, promoting relaxation,  increasing ROM, reducing/eliminating soft tissue swelling/inflammation/restriction, improving soft tissue extensibility and allowing for proper ROM for normal function with self care, mobility, lifting and ambulation. Modalities:  [] (66197) Vasopneumatic compression: Utilized vasopneumatic compression to decrease edema / swelling for the purpose of improving mobility and quad tone / recruitment which will allow for increased overall function including but not limited to self-care, transfers, ambulation, and ascending / descending stairs. Charges:   Start time: 2:31 End time: 3:19  Timed Code Treatment Minutes: 48   Total Treatment Minutes: 48     [] EVAL - LOW (33220)   [] EVAL - MOD (41312)  [] EVAL - HIGH (92987)  [] RE-EVAL (17322)  [x] DT(52504) x 1      [] Ionto  [x] NMR (01700) x1       [] Vaso  [x] Manual (08945) x1       [] Ultrasound  [] TA x        [] Mech Traction (98698)  [] Aquatic Therapy x      [] ES (un) (35831):   [] Home Management Training x  [] ES(attended) (00392)   [] Dry Needling 1-2 muscles (48233):  [] Dry Needling 3+ muscles (442564  [] Group:      [] Other:     GOALS:  Patient stated goal:  Exercising regularly, return to walking longer distances, and wearing heels   []? Progressing: []? Met: []? Not Met: []? Adjusted     Therapist goals for Patient:   Short Term Goals: To be achieved in: 2 weeks  1. Independent in HEP and progression per patient tolerance, in order to prevent re-injury. []? Progressing: []? Met: []? Not Met: []? Adjusted  2. Patient will have a decrease in pain to facilitate improvement in movement, function, and ADLs as indicated by Functional Deficits. []? Progressing: []? Met: []? Not Met: []? Adjusted     Long Term Goals: To be achieved in: 6 weeks  1. Disability index score of 15% or less for the LEFS to assist with reaching prior level of function. []? Progressing: []? Met: []? Not Met: []? Adjusted  2.  Patient will demonstrate increased knee AROM to 120 deg or better to allow for proper joint functioning as indicated by patients Functional Deficits. []? Progressing: []? Met: []? Not Met: []? Adjusted  3. Patient will demonstrate an increase in Strength to at least 4+/5 as well as good proximal hip strength and control to allow for proper functional mobility as indicated by patients Functional Deficits. []? Progressing: []? Met: []? Not Met: []? Adjusted  4. Patient will return to functional activities including stair navigation without increased symptoms or restriction. []? Progressing: []? Met: []? Not Met: []? Adjusted  5. Patient will return to regular exercise including ambulating greater than 30min without increase in symptoms or restriction. []? Progressing: []? Met: []? Not Met: []? Adjusted         Overall Progression Towards Functional goals/ Treatment Progress Update:  [] Patient is progressing as expected towards functional goals listed. [] Progression is slowed due to complexities/Impairments listed. [] Progression has been slowed due to co-morbidities. [x] Plan just implemented, too soon to assess goals progression <30days   [] Goals require adjustment due to lack of progress  [] Patient is not progressing as expected and requires additional follow up with physician  [] Other    Persisting Functional Limitations/Impairments:  []Sitting []Standing   [x]Walking [x]Stairs   []Transfers [x]ADLs   [x]Squatting/bending [x]Kneeling  [x]Housework []Job related tasks  []Driving []Sports/Recreation   [x]Sleeping []Other:    ASSESSMENT:  Pt tolerated treatment well this date. Pt requires cueing to reduce ROM for static stretching to not push into high levels of pain. Pt would benefit from continued cueing to reduce effort if symptoms increase. Pt demonstrates better AROM with eversion and inversion this date and does not require tactile cueing to facilitate proper motion. Pt was able to perform doming but required tactile cueing and verbal cueing to \"roll ankle to the outside while keeping big toe flat\".  Performed SL balance on flat ground with moderate difficulty demonstrating ankle balancing strategies while maintaining doming. Pt will continue to benefit from skilled PT in order to improve ankle ROM so that she may return to ambulating greater than 30 min without increase in symptoms. Treatment/Activity Tolerance:  [x] Pt able to complete treatment [] Patient limited by fatique  [] Patient limited by pain  [] Patient limited by other medical complications  [] Other:     Prognosis: [x] Good [] Fair  [] Poor    Patient Requires Follow-up: [x] Yes  [] No    Return to Play:    [x]  N/A   []  Stage 1: Intro to Strength   []  Stage 2: Return to Run and Strength   []  Stage 3: Return to Jump and Strength   []  Stage 4: Dynamic Strength and Agility   []  Stage 5: Sport Specific Training     []  Ready to Return to Play, Meets All Above Stages   []  Not Ready for Return to Sports   Comments:            PLAN: See eval. PT 1-2x / week for 4-6 weeks. [x] Continue per plan of care [] Alter current plan (see comments)  [] Plan of care initiated [] Hold pending MD visit [] Discharge    Electronically signed by: Evan Hadley PT, DPT    Kiersten Virk, SPT    Therapist was present, directed the patient's care, made skilled judgement, and was responsible for assessment and treatment of the patient. Note: If patient does not return for scheduled/ recommended follow up visits, this note will serve as a discharge from care along with most recent update on progress.

## 2021-03-29 ENCOUNTER — HOSPITAL ENCOUNTER (OUTPATIENT)
Dept: PHYSICAL THERAPY | Age: 51
Setting detail: THERAPIES SERIES
Discharge: HOME | End: 2021-03-29
Payer: COMMERCIAL

## 2021-03-29 PROCEDURE — 97112 NEUROMUSCULAR REEDUCATION: CPT

## 2021-03-29 PROCEDURE — 97110 THERAPEUTIC EXERCISES: CPT

## 2021-03-29 NOTE — FLOWSHEET NOTE
Derik Guerrero  Phone: [phone]   Fax: [phone]    Physical Therapy Treatment Note/ Progress Report:     Date:  3/29/2021    Patient Name:  Diogenes Ward    :  1970  MRN: [id number]  Restrictions/Precautions:    Medical/Treatment Diagnosis Information:  · Diagnosis: A00.611V (ICD-10-CM) - Sprain of anterior talofibular ligament of left ankle, initial encounter S80. 02XA (ICD-10-CM) - Contusion of left knee, initial encounter  · Treatment Diagnosis: Left knee pain, Left ankle pain, decreased ROM, Decreased strength  Insurance/Certification information:  PT Insurance Information: Workers comp [employer] and [employer] [place]  Physician Information:  Referring Practitioner: Carin Parker MD  Plan of care signed (Y/N): []  Yes [x]  No     Date of Patient follow up with Physician:      Progress Report: []  Yes  [x]  No     Date Range for reporting period:  Beginning: 3/18/21  Ending:     Progress report due (10 Rx/or 30 days whichever is less): visit #10 or 3/92/99  (date)     Recertification due (POC duration/ or 90 days whichever is less): visit #12 or 21 (date)     Visit # Insurance Allowable Auth required? Date Range   3/10 10 []  Yes  [x]  No      Latex Allergy:  [x]NO      []YES  Preferred Language for Healthcare:   [x]English       []other:    Functional Scale:        Date assessed:  LEFS: raw score = 58; dysfunction = 27.5%   3/18/21    Pain level: 3/10     SUBJECTIVE: most pain at night - on knee cap - likes to sleep prone but has been sleeping on side due to discomfort. Still feels like her L LE is weak.  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  [] (86492) Provided verbal/tactile cueing for activities related to improving balance, coordination, kinesthetic sense, posture, motor skill, proprioception  to assist with LE, proximal hip, and core control in self care, mobility, lifting, ambulation and eccentric single leg control.   [] (15404) Therapist is in constant attendance of 2 or more patients providing skilled therapy interventions, but not providing any significant amount of measurable one-on-one time to either patient, for improvements in LE, proximal hip, and core control in self care, mobility, lifting, ambulation and eccentric single leg control.      NMR and Therapeutic Activities:    [x] (57426 or 15880) Provided verbal/tactile cueing for activities related to improving balance, coordination, kinesthetic sense, posture, motor skill, proprioception and motor activation to allow for proper function of core, proximal hip and LE with self care and ADLs  [] (54227) Gait Re-education- Provided training and instruction to the patient for proper LE, core and proximal hip recruitment and positioning and eccentric body weight control with ambulation re-education including up and down stairs     Home Exercise Program:    [x] (61278) Reviewed/Progressed HEP activities related to strengthening, flexibility, endurance, ROM of core, proximal hip and LE for functional self-care, mobility, lifting and ambulation/stair navigation   [] (48517)Reviewed/Progressed HEP activities related to improving balance, coordination, kinesthetic sense, posture, motor skill, proprioception of core, proximal hip and LE for self care, mobility, lifting, and ambulation/stair navigation Manual Treatments:  PROM / STM / Oscillations-Mobs:  G-I, II, III, IV (PA's, Inf., Post.)  [x] (72860) Provided manual therapy to mobilize LE, proximal hip and/or LS spine soft tissue/joints for the purpose of modulating pain, promoting relaxation,  increasing ROM, reducing/eliminating soft tissue swelling/inflammation/restriction, improving soft tissue extensibility and allowing for proper ROM for normal function with self care, mobility, lifting and ambulation. Modalities:  [] (39417) Vasopneumatic compression: Utilized vasopneumatic compression to decrease edema / swelling for the purpose of improving mobility and quad tone / recruitment which will allow for increased overall function including but not limited to self-care, transfers, ambulation, and ascending / descending stairs. Charges:   Start time:830 End time: 65  Timed Code Treatment Minutes: 44   Total Treatment Minutes: 4     [] EVAL - LOW (76508)   [] EVAL - MOD (88629)  [] EVAL - HIGH (06748)  [] RE-EVAL (24218)  [x] SK(56571) x 1      [] Ionto  [x] NMR (12061) x1       [] Vaso  [x] Manual (46926) x1       [] Ultrasound  [] TA x        [] Mech Traction (31881)  [] Aquatic Therapy x      [] ES (un) (89367):   [] Home Management Training x  [] ES(attended) (30629)   [] Dry Needling 1-2 muscles (50364):  [] Dry Needling 3+ muscles (548420  [] Group:      [] Other:     GOALS:  Patient stated goal:  Exercising regularly, return to walking longer distances, and wearing heels   []? Progressing: []? Met: []? Not Met: []? Adjusted     Therapist goals for Patient:   Short Term Goals: To be achieved in: 2 weeks  1. Independent in HEP and progression per patient tolerance, in order to prevent re-injury. []? Progressing: []? Met: []? Not Met: []? Adjusted  2. Patient will have a decrease in pain to facilitate improvement in movement, function, and ADLs as indicated by Functional Deficits. []? Progressing: []? Met: []? Not Met: []?  Adjusted     Long Term Goals: To be achieved in: 6 weeks  1. Disability index score of 15% or less for the LEFS to assist with reaching prior level of function. []? Progressing: []? Met: []? Not Met: []? Adjusted  2. Patient will demonstrate increased knee AROM to 120 deg or better to allow for proper joint functioning as indicated by patients Functional Deficits. []? Progressing: []? Met: []? Not Met: []? Adjusted  3. Patient will demonstrate an increase in Strength to at least 4+/5 as well as good proximal hip strength and control to allow for proper functional mobility as indicated by patients Functional Deficits. []? Progressing: []? Met: []? Not Met: []? Adjusted  4. Patient will return to functional activities including stair navigation without increased symptoms or restriction. []? Progressing: []? Met: []? Not Met: []? Adjusted  5. Patient will return to regular exercise including ambulating greater than 30min without increase in symptoms or restriction. []? Progressing: []? Met: []? Not Met: []? Adjusted         Overall Progression Towards Functional goals/ Treatment Progress Update:  [] Patient is progressing as expected towards functional goals listed. [] Progression is slowed due to complexities/Impairments listed. [] Progression has been slowed due to co-morbidities. [x] Plan just implemented, too soon to assess goals progression <30days   [] Goals require adjustment due to lack of progress  [] Patient is not progressing as expected and requires additional follow up with physician  [] Other    Persisting Functional Limitations/Impairments:  []Sitting []Standing   [x]Walking [x]Stairs   []Transfers [x]ADLs   [x]Squatting/bending [x]Kneeling  [x]Housework []Job related tasks  []Driving []Sports/Recreation   [x]Sleeping []Other:    ASSESSMENT:  3/29 good progress,  Needs improved sleep position without valgus stress at L knee.   Fatigues quickly with exercises and CKC activ; has decreased eccentric control of

## 2021-04-02 ENCOUNTER — HOSPITAL ENCOUNTER (OUTPATIENT)
Dept: PHYSICAL THERAPY | Age: 51
Setting detail: THERAPIES SERIES
Discharge: HOME | End: 2021-04-02
Payer: COMMERCIAL

## 2021-04-02 PROCEDURE — 97140 MANUAL THERAPY 1/> REGIONS: CPT

## 2021-04-02 PROCEDURE — 97110 THERAPEUTIC EXERCISES: CPT

## 2021-04-02 PROCEDURE — 97112 NEUROMUSCULAR REEDUCATION: CPT

## 2021-04-02 NOTE — FLOWSHEET NOTE
Derik Guerrero  Phone: [phone]   Fax: [phone]    Physical Therapy Treatment Note/ Progress Report:     Date:  2021    Patient Name:  Moise Murray    :  1970  MRN: [id number]  Restrictions/Precautions:    Medical/Treatment Diagnosis Information:  · Diagnosis: G85.823H (ICD-10-CM) - Sprain of anterior talofibular ligament of left ankle, initial encounter S80. 02XA (ICD-10-CM) - Contusion of left knee, initial encounter  · Treatment Diagnosis: Left knee pain, Left ankle pain, decreased ROM, Decreased strength  Insurance/Certification information:  PT Insurance Information: Workers comp [employer] and [employer] [place]  Physician Information:  Referring Practitioner: Veronica Boswell MD  Plan of care signed (Y/N): []  Yes [x]  No     Date of Patient follow up with Physician:      Progress Report: []  Yes  [x]  No     Date Range for reporting period:  Beginning: 3/18/21  Ending:     Progress report due (10 Rx/or 30 days whichever is less): visit #10 or   (date)     Recertification due (POC duration/ or 90 days whichever is less): visit #12 or 21 (date)     Visit # Insurance Allowable Auth required? Date Range   4/10 10 []  Yes  [x]  No      Latex Allergy:  [x]NO      []YES  Preferred Language for Healthcare:   [x]English       []other:    Functional Scale:        Date assessed:  LEFS: raw score = 58; dysfunction = 27.5%   3/18/21    Pain level: 3/10     SUBJECTIVE:  / having more ankle pain during day and more knee pain at night with sleeping- its more discomfort than pain   3/29 most pain at night - on knee cap - likes to sleep prone but has been sleeping on side due to discomfort. Still feels like her L LE is weak.  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 [facility]    OBJECTIVE: See eval      RESTRICTIONS/PRECAUTIONS: hx of DM    Exercises/Interventions:     Therapeutic Exercise (82694)   Start time: 915 End time: 940 Resistance / level Sets/sec Reps Notes / Cues   Long sit calf stretch with towel   IB/HR/TR  30\"x2/2x10/2x10   standing quad stretch - foot on chair   30s 2  3/29 review all band ex for HEP   TB plantarflexion  Blue    TB dorsiflexion  Blue    Eversion AROM  Peach band NPV   Inversion AROM  Peach band NPV            Nu step  L25.5 min  S= 12, arms =11   Step stretches  HS and hip flex   6\"   2x30\" each B     Step ups   frd  lat   4\"   1x10 B     B UE support   Cables  SLRx3   1 pl    1x10 ea B                    Therapeutic Activities (79625)  Start time:945  End time: 955       Instruct in sleep position with L knee in neutral with approp pillow support Review and re-instruct in appropriate size pillow for bn knees'   4/1 Instructed pt in sleep positions during BAPS board ex    Pt ed re over the counter foot orthotics - encouraged her to try superfeet and powersteps - see what is comfortable and how to gradually increase wear time    4/1 instructed pt in this while applying ktape                 Neuromuscular Re-ed (74088)  Start time: 940 End time: [address] to rotate ankle without moving knee   SL balance (shoe off) Airex 60s 1, B B UE support   Towel toe crunches      BAPS L2 1x10 B feet on BAPS board                          Manual Intervention ([phone])  Start time: 950-1000       Knee mobs/PROM       Tib/Fem Mobs       Patella Mobs      Ankle mobs      STM patella tendon and med/lat ankle      ktape trial:  2 Y strips to L knee: 25% stretch peripat inf to sup& 50% stretch peripat sup to inf          Modalities: 3/29 ice bag to go for L knee    Pt. Education:  3/18 Pt educated re pros and cons of over the counter orthotics to improve alignment of ankles and knees  -pt educated on diagnosis, prognosis and expectations for rehab  -all pt questions were answered    Home Exercise Program:  3/18 Access Code: Park Quezada: [email or web address]. com/  Date: 03/18/2021  Prepared by: Lamar SorensenExercises   Long Sitting Calf Stretch with Strap - 2 x daily - 7 x weekly - 1 sets - 3 reps - 30 hold   Long Sitting Ankle Plantar Flexion with Resistance - 2 x daily - 7 x weekly - 1-2 sets - 10 reps - 1 hold   Ankle Dorsiflexion with Resistance - 2 x daily - 7 x weekly - 1-2 sets - 10 reps - 1 hold   Seated Ankle Inversion with Resistance and Legs Crossed - 2 x daily - 7 x weekly - 1 sets - 10 reps - 1 hold   Long Sitting Ankle Eversion with Resistance - 2 x daily - 7 x weekly - 1-2 sets - 10 reps - 1 hold   Sidelying Quadriceps Stretch - 2 x daily - 7 x weekly - 1 sets - 3 reps - 30 hold      Therapeutic Exercise and NMR EXR  [x] (78835) Provided verbal/tactile cueing for activities related to strengthening, flexibility, endurance, ROM for improvements in LE, proximal hip, and core control with self care, mobility, lifting, ambulation.  [] (47214) Provided verbal/tactile cueing for activities related to improving balance, coordination, kinesthetic sense, posture, motor skill, proprioception  to assist with LE, proximal hip, and core control in self care, mobility, lifting, ambulation and eccentric single leg control.   [] (25418) Therapist is in constant attendance of 2 or more patients providing skilled therapy interventions, but not providing any significant amount of measurable one-on-one time to either patient, for improvements in LE, proximal hip, and core control in self care, mobility, lifting, ambulation and eccentric single leg control.      NMR and Therapeutic Activities:    [x] (81050 or 88808) Provided verbal/tactile cueing for activities related to improving balance, coordination, kinesthetic sense, posture, motor skill, proprioception and motor activation to allow for proper function of core, proximal hip and LE with self care and ADLs  [] (47675) Gait Re-education- Provided training and instruction to the patient for proper LE, core and proximal hip recruitment and positioning and progression <30days   [] Goals require adjustment due to lack of progress  [] Patient is not progressing as expected and requires additional follow up with physician  [] Other    Persisting Functional Limitations/Impairments:  []Sitting []Standing   [x]Walking [x]Stairs   []Transfers [x]ADLs   [x]Squatting/bending [x]Kneeling  [x]Housework []Job related tasks  []Driving []Sports/Recreation   [x]Sleeping []Other:    ASSESSMENT: 4/1 progressing. Pt will benefit from trial of over the counter inserts to corect over pronation which causes valgus stress at L knee   3/29 good progress,  Needs improved sleep position without valgus stress at L knee. Fatigues quickly with exercises and CKC activ; has decreased eccentric control of L knee and ankle. 3/26 Pt tolerated treatment well this date. Pt requires cueing to reduce ROM for static stretching to not push into high levels of pain. Pt would benefit from continued cueing to reduce effort if symptoms increase. Pt demonstrates better AROM with eversion and inversion this date and does not require tactile cueing to facilitate proper motion. Pt was able to perform doming but required tactile cueing and verbal cueing to \"roll ankle to the outside while keeping big toe flat\". Performed SL balance on flat ground with moderate difficulty demonstrating ankle balancing strategies while maintaining doming. Pt will continue to benefit from skilled PT in order to improve ankle ROM so that she may return to ambulating greater than 30 min without increase in symptoms.           Treatment/Activity Tolerance:  [x] Pt able to complete treatment [] Patient limited by fatique  [] Patient limited by pain  [] Patient limited by other medical complications  [] Other:     Prognosis: [x] Good [] Fair  [] Poor    Patient Requires Follow-up: [x] Yes  [] No    Return to Play:    [x]  N/A   []  Stage 1: Intro to Strength   []  Stage 2: Return to Run and Strength   []  Stage 3: Return to Jump and Strength   []  Stage 4: Dynamic Strength and Agility   []  Stage 5: Sport Specific Training     []  Ready to Return to Play, Meets All Above Stages   []  Not Ready for Return to Sports   Comments:            PLAN: See eval. PT 1-2x / week for 4-6 weeks. [x] Continue per plan of care [] Alter current plan (see comments)  [] Plan of care initiated [] Hold pending MD visit [] Discharge    Electronically signed by: Mayo Jules PT, DPT      Note: If patient does not return for scheduled/ recommended follow up visits, this note will serve as a discharge from care along with most recent update on progress.

## 2021-04-06 ENCOUNTER — HOSPITAL ENCOUNTER (OUTPATIENT)
Dept: PHYSICAL THERAPY | Age: 51
Setting detail: THERAPIES SERIES
Discharge: HOME | End: 2021-04-06
Payer: COMMERCIAL

## 2021-04-06 PROCEDURE — 97112 NEUROMUSCULAR REEDUCATION: CPT | Performed by: PHYSICAL THERAPIST

## 2021-04-06 PROCEDURE — 97110 THERAPEUTIC EXERCISES: CPT | Performed by: PHYSICAL THERAPIST

## 2021-04-06 NOTE — FLOWSHEET NOTE
Resistance / level Sets/sec Reps Notes / Cues     IB-gastroc/IB-soleus/HR/TR  30\"x2/30\"x2/2x10/2x10   standing quad stretch - foot on chair   30s 2     TB plantarflexion  Blue    TB dorsiflexion  Blue    Eversion AROM green 2 10 Peach band NPV   Inversion AROM green 2 10 Peach band NPV               Upright bike L25 min     Step stretches  HS and hip flex   6\"   2x30\" each B                Lateral band stepping green 2 10 steps    Leg press  Calf press DL 60#  DL 60# 2  2 10  10           Therapeutic Activities (96080)  Start time:8:12  End time: 8:16        Step ups   frd  lat   8\"   2   10 Cueing for alignment and control                        Neuromuscular Re-ed (28817)  Start time: 816  End time: 8:26        Doming   Cueing to rotate ankle without moving knee   SL balance (shoe off) Airex 30s 2 Fingertip support   Towel toe crunches      BAPS     biodex balance RC L10 3'  Occasional UE support   BOSU step   1 15 UE support          Manual Intervention ([id number])  Start time: End time:        Knee mobs/PROM       Tib/Fem Mobs       Patella Mobs      Ankle mobs      STM patella tendon and med/lat ankle      ktape trial:           Modalities: 3/29 ice bag to go for L knee    Pt. Education:  3/18 Pt educated re pros and cons of over the counter orthotics to improve alignment of ankles and knees  -pt educated on diagnosis, prognosis and expectations for rehab  -all pt questions were answered    Home Exercise Program:  3/18 Access Code: Park Quezada: [email or web address] com/  Date: 03/18/2021  Prepared by: Glo Tapia Sitting Calf Stretch with Strap - 2 x daily - 7 x weekly - 1 sets - 3 reps - 30 hold   Long Sitting Ankle Plantar Flexion with Resistance - 2 x daily - 7 x weekly - 1-2 sets - 10 reps - 1 hold   Ankle Dorsiflexion with Resistance - 2 x daily - 7 x weekly - 1-2 sets - 10 reps - 1 hold   Seated Ankle Inversion with Resistance and Legs Crossed - 2 x daily - 7 x weekly - 1 sets - 10 reps - 1 hold   Long Sitting Ankle Eversion with Resistance - 2 x daily - 7 x weekly - 1-2 sets - 10 reps - 1 hold   Sidelying Quadriceps Stretch - 2 x daily - 7 x weekly - 1 sets - 3 reps - 30 hold      Therapeutic Exercise and NMR EXR  [x] (32408) Provided verbal/tactile cueing for activities related to strengthening, flexibility, endurance, ROM for improvements in LE, proximal hip, and core control with self care, mobility, lifting, ambulation.  [] (00565) Provided verbal/tactile cueing for activities related to improving balance, coordination, kinesthetic sense, posture, motor skill, proprioception  to assist with LE, proximal hip, and core control in self care, mobility, lifting, ambulation and eccentric single leg control.   [] (21981) Therapist is in constant attendance of 2 or more patients providing skilled therapy interventions, but not providing any significant amount of measurable one-on-one time to either patient, for improvements in LE, proximal hip, and core control in self care, mobility, lifting, ambulation and eccentric single leg control.      NMR and Therapeutic Activities:    [x] (16039 or 49778) Provided verbal/tactile cueing for activities related to improving balance, coordination, kinesthetic sense, posture, motor skill, proprioception and motor activation to allow for proper function of core, proximal hip and LE with self care and ADLs  [] (46732) Gait Re-education- Provided training and instruction to the patient for proper LE, core and proximal hip recruitment and positioning and eccentric body weight control with ambulation re-education including up and down stairs     Home Exercise Program:    [x] (47951) Reviewed/Progressed HEP activities related to strengthening, flexibility, endurance, ROM of core, proximal hip and LE for functional self-care, mobility, lifting and ambulation/stair navigation   [] (51988)Reviewed/Progressed HEP activities related to improving balance, coordination, kinesthetic sense, posture, motor skill, proprioception of core, proximal hip and LE for self care, mobility, lifting, and ambulation/stair navigation      Manual Treatments:  PROM / STM / Oscillations-Mobs:  G-I, II, III, IV (PA's, Inf., Post.)  [x] (02743) Provided manual therapy to mobilize LE, proximal hip and/or LS spine soft tissue/joints for the purpose of modulating pain, promoting relaxation,  increasing ROM, reducing/eliminating soft tissue swelling/inflammation/restriction, improving soft tissue extensibility and allowing for proper ROM for normal function with self care, mobility, lifting and ambulation. Modalities:  [] (14278) Vasopneumatic compression: Utilized vasopneumatic compression to decrease edema / swelling for the purpose of improving mobility and quad tone / recruitment which will allow for increased overall function including but not limited to self-care, transfers, ambulation, and ascending / descending stairs. Charges:   Start time:745 End time: 8:26  Timed Code Treatment Minutes: 41   Total Treatment Minutes: 41     [] EVAL - LOW (65776)   [] EVAL - MOD (04612)  [] EVAL - HIGH (34061)  [] RE-EVAL (68734)  [x] MQ(40361) x 2      [] Ionto  [x] NMR (93789) x1       [] Vaso  [] Manual (76933) x       [] Ultrasound  [] TA x        [] Mech Traction (13525)  [] Aquatic Therapy x      [] ES (un) (50157):   [] Home Management Training x  [] ES(attended) (89965)   [] Dry Needling 1-2 muscles (66239):  [] Dry Needling 3+ muscles (387149  [] Group:      [] Other:     GOALS:  Patient stated goal:  Exercising regularly, return to walking longer distances, and wearing heels   []? Progressing: []? Met: []? Not Met: []? Adjusted     Therapist goals for Patient:   Short Term Goals: To be achieved in: 2 weeks  1. Independent in HEP and progression per patient tolerance, in order to prevent re-injury. []? Progressing: []? Met: []? Not Met: []? Adjusted  2.  Patient will have a decrease in pain to Tolerated therapy today without complaints. Upgrades to exercises in bold above. Able to add resistance to ankle inversion and eversion, cueing throughout to decrease compensatory movements. Cueing with step ups to maintain proper alignment. Pt. Continues to present with deficits in proprioception and requires UE support with balance activities. Initiated leg press and calf press without issue, cueing for full available range with calf press. Pt. Will continue to benefit from skilled therapy in order to restore ankle functional strength and proprioception to allow for ambulation and descending stairs without issue. Treatment/Activity Tolerance:  [x] Pt able to complete treatment [] Patient limited by fatique  [] Patient limited by pain  [] Patient limited by other medical complications  [] Other:     Prognosis: [x] Good [] Fair  [] Poor    Patient Requires Follow-up: [x] Yes  [] No    Return to Play:    [x]  N/A   []  Stage 1: Intro to Strength   []  Stage 2: Return to Run and Strength   []  Stage 3: Return to Jump and Strength   []  Stage 4: Dynamic Strength and Agility   []  Stage 5: Sport Specific Training     []  Ready to Return to Play, Meets All Above Stages   []  Not Ready for Return to Sports   Comments:            PLAN: See eval. PT 1-2x / week for 4-6 weeks. [x] Continue per plan of care [] Alter current plan (see comments)  [] Plan of care initiated [] Hold pending MD visit [] Discharge    Electronically signed by: Valentino Snowman PT, DPT      Note: If patient does not return for scheduled/ recommended follow up visits, this note will serve as a discharge from care along with most recent update on progress.

## 2021-04-07 ENCOUNTER — TELEPHONE (OUTPATIENT)
Dept: ORTHOPEDIC SURGERY | Age: 51
End: 2021-04-07

## 2021-04-08 ENCOUNTER — HOSPITAL ENCOUNTER (OUTPATIENT)
Dept: PHYSICAL THERAPY | Age: 51
Setting detail: THERAPIES SERIES
Discharge: HOME | End: 2021-04-08
Payer: COMMERCIAL

## 2021-04-08 PROCEDURE — 97112 NEUROMUSCULAR REEDUCATION: CPT | Performed by: PHYSICAL THERAPIST

## 2021-04-08 PROCEDURE — 97110 THERAPEUTIC EXERCISES: CPT | Performed by: PHYSICAL THERAPIST

## 2021-04-08 NOTE — FLOWSHEET NOTE
Derik Rodriguez  Phone: [phone]   Fax: [phone]    Physical Therapy Treatment Note/ Progress Report:     Date:  2021    Patient Name:  Brianne Tucker    :  1970  MRN: [id number]  Restrictions/Precautions:    Medical/Treatment Diagnosis Information:  · Diagnosis: K62.030I (ICD-10-CM) - Sprain of anterior talofibular ligament of left ankle, initial encounter S80. 02XA (ICD-10-CM) - Contusion of left knee, initial encounter  · Treatment Diagnosis: Left knee pain, Left ankle pain, decreased ROM, Decreased strength  Insurance/Certification information:  PT Insurance Information: Workers comp [employer] and [employer] [place]  Physician Information:  Referring Practitioner: Haleigh Sher MD  Plan of care signed (Y/N): []  Yes [x]  No     Date of Patient follow up with Physician:      Progress Report: []  Yes  [x]  No     Date Range for reporting period:  Beginning: 3/18/21  Ending:     Progress report due (10 Rx/or 30 days whichever is less): visit #10 or   (date)     Recertification due (POC duration/ or 90 days whichever is less): visit #12 or 21 (date)     Visit # Insurance Allowable Auth required? Date Range   6/10 10 []  Yes  [x]  No      Latex Allergy:  [x]NO      []YES  Preferred Language for Healthcare:   [x]English       []other:    Functional Scale:        Date assessed:  LEFS: raw score = 58; dysfunction = 27.5%   3/18/21    Pain level: 0/10     SUBJECTIVE:  Pt. Denies pain at this time. Pt. States that she continues to have a little occasional discomfort at night, last night she took some medication to help with sleeping. Pt. Notes that she continues to be a little fearful descending stairs.         OBJECTIVE: See eval      RESTRICTIONS/PRECAUTIONS: hx of DM    Exercises/Interventions:     Therapeutic Exercise (18494)   Start time:658  End time:726  Resistance / level Sets/sec Reps Notes / Cues     IB-gastroc/IB-soleus  30\"x2/30\"x2 HR Over half roll 2 15    standing quad stretch - foot on chair   30s 2     TB plantarflexion  Blue    TB dorsiflexion  Blue    Eversion AROM green 2 10    Inversion AROM green 2 10                Upright bike L25 min     Step stretches  HS and hip flex   8\"   2x30\" each B                Lateral band stepping green 3 10 steps    Leg press  Calf press DL 70#  DL 70# 2  2 10  10    Soleus press npv             Therapeutic Activities (65750)  Start time:726  End time:732         Step ups   frd up and over  lat   6\"  8\"   2  2   10  10 Cueing for alignment and control                        Neuromuscular Re-ed (84053)  Start time:732   End time:742         Doming   Cueing to rotate ankle without moving knee   SL balance (shoe off) Airex 30s 2 Fingertip support   Towel toe crunches      BAPS     biodex balance RC L10 3'  Occasional UE support   BOSU step   1 15 UE support          Manual Intervention (73357)  Start time: End time:        Knee mobs/PROM       Tib/Fem Mobs       Patella Mobs      Ankle mobs      STM patella tendon and med/lat ankle      ktape trial:           Modalities: 3/29 ice bag to go for L knee    Pt. Education:  3/18 Pt educated re pros and cons of over the counter orthotics to improve alignment of ankles and knees  -pt educated on diagnosis, prognosis and expectations for rehab  -all pt questions were answered    Home Exercise Program:  3/18 Access Code: Rehana Browning: [email or web address]/  Date: 03/18/2021  Prepared by: Helen West Sitting Calf Stretch with Strap - 2 x daily - 7 x weekly - 1 sets - 3 reps - 30 hold   Long Sitting Ankle Plantar Flexion with Resistance - 2 x daily - 7 x weekly - 1-2 sets - 10 reps - 1 hold   Ankle Dorsiflexion with Resistance - 2 x daily - 7 x weekly - 1-2 sets - 10 reps - 1 hold   Seated Ankle Inversion with Resistance and Legs Crossed - 2 x daily - 7 x weekly - 1 sets - 10 reps - 1 hold   Long Sitting Ankle Eversion with Resistance - 2 x daily - 7 x weekly - 1-2 sets - 10 reps - 1 hold   Sidelying Quadriceps Stretch - 2 x daily - 7 x weekly - 1 sets - 3 reps - 30 hold      Therapeutic Exercise and NMR EXR  [x] (96188) Provided verbal/tactile cueing for activities related to strengthening, flexibility, endurance, ROM for improvements in LE, proximal hip, and core control with self care, mobility, lifting, ambulation.  [] (31627) Provided verbal/tactile cueing for activities related to improving balance, coordination, kinesthetic sense, posture, motor skill, proprioception  to assist with LE, proximal hip, and core control in self care, mobility, lifting, ambulation and eccentric single leg control.   [] (71423) Therapist is in constant attendance of 2 or more patients providing skilled therapy interventions, but not providing any significant amount of measurable one-on-one time to either patient, for improvements in LE, proximal hip, and core control in self care, mobility, lifting, ambulation and eccentric single leg control.      NMR and Therapeutic Activities:    [x] (87907 or 88623) Provided verbal/tactile cueing for activities related to improving balance, coordination, kinesthetic sense, posture, motor skill, proprioception and motor activation to allow for proper function of core, proximal hip and LE with self care and ADLs  [] (70281) Gait Re-education- Provided training and instruction to the patient for proper LE, core and proximal hip recruitment and positioning and eccentric body weight control with ambulation re-education including up and down stairs     Home Exercise Program:    [x] (37403) Reviewed/Progressed HEP activities related to strengthening, flexibility, endurance, ROM of core, proximal hip and LE for functional self-care, mobility, lifting and ambulation/stair navigation   [] (09295)Reviewed/Progressed HEP activities related to improving balance, coordination, kinesthetic sense, posture, motor skill, proprioception of core, resistance with leg press, cueing for correct technique with calf press. Pt. Oskar Davis by step up and over, difficulty controlling eccentric lowering and persisting deficits in DF limiting step down. Pt. Continues to require UE support with balance activities. Cueing with band inversion and eversion to decrease compensations. Pt. Will continue to benefit from skilled therapy in order to restore mobility and functional strength for decreased pain with sleeping and stairs. Treatment/Activity Tolerance:  [x] Pt able to complete treatment [] Patient limited by fatique  [] Patient limited by pain  [] Patient limited by other medical complications  [] Other:     Prognosis: [x] Good [] Fair  [] Poor    Patient Requires Follow-up: [x] Yes  [] No    Return to Play:    [x]  N/A   []  Stage 1: Intro to Strength   []  Stage 2: Return to Run and Strength   []  Stage 3: Return to Jump and Strength   []  Stage 4: Dynamic Strength and Agility   []  Stage 5: Sport Specific Training     []  Ready to Return to Play, Meets All Above Stages   []  Not Ready for Return to Sports   Comments:            PLAN: See omar. PT 1-2x / week for 4-6 weeks. [x] Continue per plan of care [] Alter current plan (see comments)  [] Plan of care initiated [] Hold pending MD visit [] Discharge    Electronically signed by: Latonya Pryor PT, DPT      Note: If patient does not return for scheduled/ recommended follow up visits, this note will serve as a discharge from care along with most recent update on progress.

## 2021-04-13 ENCOUNTER — HOSPITAL ENCOUNTER (OUTPATIENT)
Dept: PHYSICAL THERAPY | Age: 51
Setting detail: THERAPIES SERIES
Discharge: HOME | End: 2021-04-13
Payer: COMMERCIAL

## 2021-04-13 ENCOUNTER — OFFICE VISIT (OUTPATIENT)
Dept: ORTHOPEDIC SURGERY | Age: 51
End: 2021-04-13
Payer: COMMERCIAL

## 2021-04-13 VITALS — HEIGHT: 68 IN | BODY MASS INDEX: 37.89 KG/M2 | WEIGHT: 250 LBS

## 2021-04-13 DIAGNOSIS — S80.02XA CONTUSION OF LEFT KNEE, INITIAL ENCOUNTER: Primary | ICD-10-CM

## 2021-04-13 DIAGNOSIS — S93.492D SPRAIN OF ANTERIOR TALOFIBULAR LIGAMENT OF LEFT ANKLE, SUBSEQUENT ENCOUNTER: ICD-10-CM

## 2021-04-13 PROCEDURE — 97112 NEUROMUSCULAR REEDUCATION: CPT | Performed by: PHYSICAL THERAPIST

## 2021-04-13 PROCEDURE — 99214 OFFICE O/P EST MOD 30 MIN: CPT | Performed by: ORTHOPAEDIC SURGERY

## 2021-04-13 PROCEDURE — 97110 THERAPEUTIC EXERCISES: CPT | Performed by: PHYSICAL THERAPIST

## 2021-04-13 NOTE — FLOWSHEET NOTE
[facility]  Phone: [phone]   Fax: [phone]    Physical Therapy Treatment Note/ Progress Report:     Date:  2021    Patient Name:  Corine Christie    :  1970  MRN: [id number]  Restrictions/Precautions:    Medical/Treatment Diagnosis Information:  · Diagnosis: J30.141C (ICD-10-CM) - Sprain of anterior talofibular ligament of left ankle, initial encounter S80. 02XA (ICD-10-CM) - Contusion of left knee, initial encounter  · Treatment Diagnosis: Left knee pain, Left ankle pain, decreased ROM, Decreased strength  Insurance/Certification information:  PT Insurance Information: Workers comp [employer] [place]  Physician Information:  Referring Practitioner: Rolando Mensah MD  Plan of care signed (Y/N): [x]  Yes []  No     Date of Patient follow up with Physician:      Progress Report: []  Yes  [x]  No     Date Range for reporting period:  Beginning: 3/18/21  Ending:     Progress report due (10 Rx/or 30 days whichever is less): visit #10 or   (date)     Recertification due (POC duration/ or 90 days whichever is less): visit #12 or 21 (date)     Visit # Insurance Allowable Auth required? Date Range   7/10 10 []  Yes  [x]  No      Latex Allergy:  [x]NO      []YES  Preferred Language for Healthcare:   [x]English       []other:    Functional Scale:        Date assessed:  LEFS: raw score = 58; dysfunction = 27.5%   3/18/21    Pain level: 0-3/10     SUBJECTIVE:  Pt. Reports that she continues to have difficulty getting comfortable at night. She is a little sore this morning after doing a lot over the weekend. Pt. Notes that stairs are getting a lot easier.         OBJECTIVE:      - not point tenderness      RESTRICTIONS/PRECAUTIONS: hx of DM    Exercises/Interventions:     Therapeutic Exercise (05161)   Start time:700  End time: 727  Resistance / level Sets/sec Reps Notes / Cues     IB-gastroc/IB-soleus  30\"x2/30\"x2   HR Over half roll 2 15    standing quad stretch - foot on chair   30s 2           Eversion AROM blue 2 10    Inversion AROM blue 2 10                Upright bike L25 min     Step stretches  HS and hip flex   8\"   2x30\" each B                Lateral band stepping green 3 10 steps    Leg press  Calf press DL 80#  DL 80# 2  2 10  10    Soleus press DL 40# 2 10           Therapeutic Activities (88634)  Start time: 231  End time:732        Step ups   frd   lat   8\"  8\"   2  2   10  10 Cueing for alignment and control                        Neuromuscular Re-ed (53104)  Start time:732   End time:740         Doming   Cueing to rotate ankle without moving knee   SL balance (shoe off) Fingertip support   Towel toe crunches      BAPS     biodex balance RC L10 3'  Occasional UE support   BOSU step   1 15 UE support   Trampoline chest pass, SLS on airex basketball 2 10 For improved proprioception and balance          Manual Intervention (81541)  Start time: End time:        Knee mobs/PROM       Tib/Fem Mobs       Patella Mobs      Ankle mobs      STM patella tendon and med/lat ankle      ktape trial:           Modalities: 3/29 ice bag to go for L knee    Pt. Education:  3/18 Pt educated re pros and cons of over the counter orthotics to improve alignment of ankles and knees  -pt educated on diagnosis, prognosis and expectations for rehab  -all pt questions were answered    Home Exercise Program:  3/18 Access Code: Ashley Pass: [email or web address]  Date: 03/18/2021  Prepared by: Linette Isaacs Sitting Calf Stretch with Strap - 2 x daily - 7 x weekly - 1 sets - 3 reps - 30 hold   Long Sitting Ankle Plantar Flexion with Resistance - 2 x daily - 7 x weekly - 1-2 sets - 10 reps - 1 hold   Ankle Dorsiflexion with Resistance - 2 x daily - 7 x weekly - 1-2 sets - 10 reps - 1 hold   Seated Ankle Inversion with Resistance and Legs Crossed - 2 x daily - 7 x weekly - 1 sets - 10 reps - 1 hold   Long Sitting Ankle Eversion with Resistance - 2 x daily - 7 x weekly - 1-2 sets - 10 reps - 1 hold   Sidelying Quadriceps Stretch - 2 x daily - 7 x weekly - 1 sets - 3 reps - 30 hold      Therapeutic Exercise and NMR EXR  [x] (19897) Provided verbal/tactile cueing for activities related to strengthening, flexibility, endurance, ROM for improvements in LE, proximal hip, and core control with self care, mobility, lifting, ambulation.  [] (54330) Provided verbal/tactile cueing for activities related to improving balance, coordination, kinesthetic sense, posture, motor skill, proprioception  to assist with LE, proximal hip, and core control in self care, mobility, lifting, ambulation and eccentric single leg control.   [] (10529) Therapist is in constant attendance of 2 or more patients providing skilled therapy interventions, but not providing any significant amount of measurable one-on-one time to either patient, for improvements in LE, proximal hip, and core control in self care, mobility, lifting, ambulation and eccentric single leg control.      NMR and Therapeutic Activities:    [x] (43393 or 06313) Provided verbal/tactile cueing for activities related to improving balance, coordination, kinesthetic sense, posture, motor skill, proprioception and motor activation to allow for proper function of core, proximal hip and LE with self care and ADLs  [] (66175) Gait Re-education- Provided training and instruction to the patient for proper LE, core and proximal hip recruitment and positioning and eccentric body weight control with ambulation re-education including up and down stairs     Home Exercise Program:    [x] (08355) Reviewed/Progressed HEP activities related to strengthening, flexibility, endurance, ROM of core, proximal hip and LE for functional self-care, mobility, lifting and ambulation/stair navigation   [] (36696)Reviewed/Progressed HEP activities related to improving balance, coordination, kinesthetic sense, posture, motor skill, proprioception of core, proximal hip and LE for self care, mobility, lifting, and ambulation/stair navigation      Manual Treatments:  PROM / STM / Oscillations-Mobs:  G-I, II, III, IV (PA's, Inf., Post.)  [x] (69456) Provided manual therapy to mobilize LE, proximal hip and/or LS spine soft tissue/joints for the purpose of modulating pain, promoting relaxation,  increasing ROM, reducing/eliminating soft tissue swelling/inflammation/restriction, improving soft tissue extensibility and allowing for proper ROM for normal function with self care, mobility, lifting and ambulation. Modalities:  [] (87526) Vasopneumatic compression: Utilized vasopneumatic compression to decrease edema / swelling for the purpose of improving mobility and quad tone / recruitment which will allow for increased overall function including but not limited to self-care, transfers, ambulation, and ascending / descending stairs. Charges:   Start time:700 End time: 740   Timed Code Treatment Minutes: 40   Total Treatment Minutes: 40     [] EVAL - LOW (57518)   [] EVAL - MOD (91751)  [] EVAL - HIGH (76581)  [] RE-EVAL (19862)  [x] UD(12257) x 2      [] Ionto  [x] NMR (98057) x1       [] Vaso  [] Manual (79760) x       [] Ultrasound  [] TA x        [] Mech Traction (13685)  [] Aquatic Therapy x      [] ES (un) (33774):   [] Home Management Training x  [] ES(attended) (26792)   [] Dry Needling 1-2 muscles (42828):  [] Dry Needling 3+ muscles (773529  [] Group:      [] Other:     GOALS:  Patient stated goal:  Exercising regularly, return to walking longer distances, and wearing heels   []? Progressing: []? Met: []? Not Met: []? Adjusted     Therapist goals for Patient:   Short Term Goals: To be achieved in: 2 weeks  1. Independent in HEP and progression per patient tolerance, in order to prevent re-injury. []? Progressing: []? Met: []? Not Met: []? Adjusted  2.  Patient will have a decrease in pain to facilitate improvement in movement, Progressions noted in bold above. Pt. Demonstrating improved gait throughout clinic and improving control on steps. Pt. Continues to have deficits in proprioception limiting balance, frequent balance checks required. Able to progress resistance with ankle PREs with cueing to decrease compensatory movements. Pt. Will continues to benefit from skilled therapy in order to restore functional strength for return to PLOF. Treatment/Activity Tolerance:  [x] Pt able to complete treatment [] Patient limited by fatique  [] Patient limited by pain  [] Patient limited by other medical complications  [] Other:     Prognosis: [x] Good [] Fair  [] Poor    Patient Requires Follow-up: [x] Yes  [] No    Return to Play:    [x]  N/A   []  Stage 1: Intro to Strength   []  Stage 2: Return to Run and Strength   []  Stage 3: Return to Jump and Strength   []  Stage 4: Dynamic Strength and Agility   []  Stage 5: Sport Specific Training     []  Ready to Return to Play, Meets All Above Stages   []  Not Ready for Return to Sports   Comments:            PLAN: See eval. PT 1-2x / week for 4-6 weeks. [x] Continue per plan of care [] Alter current plan (see comments)  [] Plan of care initiated [] Hold pending MD visit [] Discharge    Electronically signed by: Missy Phoenix PT, DPT      Note: If patient does not return for scheduled/ recommended follow up visits, this note will serve as a discharge from care along with most recent update on progress.

## 2021-04-13 NOTE — PROGRESS NOTES
CHIEF COMPLAINT:   1- Left ankle pain/ Ankle ATF sprain. 2- Left knee pain/ contusion. DATE OF INJURY: 2/10/2021, Worker's Comp    HISTORY:  Ms. Tai Haq 45 y.o.  female presents today for follow-up visit for evaluation of a left ankle and knee injury which occurred when she was walking at work parking lot and slipped and fell on her left knee and ankle. She states her ankle pain is much better and she can now walk her down and walking down steps is not near as painful. She states her left knee pain is improved somewhat but it still wakes her from sleep and gets very stiff. She rates her pain a 3/10 VAS in her left knee. She went to [facility] ER because of the pain. She was told that she has a sprain, splint was applied and asked to f/u with Orthopedics. She reports today mild pain. Pain increase with walking and decrease with rest and elevation. Treatment to date has consisted of rest, ice. She is currently working with physical therapy with some improvement. No numbness or tingling sensation, and no other complaint. She has been back to work full duty and works from home.     Past Medical History:   Diagnosis Date    Diabetes [facility])         Past Surgical History:   Procedure Laterality Date    BREAST REDUCTION SURGERY       SECTION  80    CHOLECYSTECTOMY      OTHER SURGICAL HISTORY  2015    EXCISION OF HYDRADENITIS RIGHT AXILLA         Social History     Socioeconomic History    Marital status:      Spouse name: Not on file    Number of children: Not on file    Years of education: Not on file    Highest education level: Not on file   Occupational History    Not on file   Social Needs    Financial resource strain: Not on file    Food insecurity     Worry: Not on file     Inability: Not on file    Transportation needs     Medical: Not on file     Non-medical: Not on file   Tobacco Use    Smoking status: Never Smoker    Smokeless tobacco: Never Used   Substance and Sexual Activity    Alcohol use: No    Drug use: No    Sexual activity: Not on file   Lifestyle    Physical activity     Days per week: Not on file     Minutes per session: Not on file    Stress: Not on file   Relationships    Social connections     Talks on phone: Not on file     Gets together: Not on file     Attends Faith service: Not on file     Active member of club or organization: Not on file     Attends meetings of clubs or organizations: Not on file     Relationship status: Not on file    Intimate partner violence     Fear of current or ex partner: Not on file     Emotionally abused: Not on file     Physically abused: Not on file     Forced sexual activity: Not on file   Other Topics Concern    Not on file   Social History Narrative    Not on file        Family History   Problem Relation Age of Onset    Diabetes Mother     High Blood Pressure Mother     Diabetes Father     Cancer Father         multiple myeloma    High Blood Pressure Father         Pertinent items are noted in HPI  Review of systems reviewed from Patient History Form dated on 2/16/2021 and available in the patient's chart under the Media tab. No change. PHYSICAL EXAM:  Ms. Nelson Damian is a very pleasant 45 y.o.  female who presents today in no acute distress, awake, alert, and oriented. She is well dressed, nourished and  groomed. Patient with normal affect. Height is  5' 8\" (1.727 m), weight is 250 lb (113.4 kg). Resting respiratory rate is 16. Examination of the gait, showed that the patient walks with no limp, WB left leg. Examination of both ankles showing a full range of motion of the left ankle and full range of motion left knee compare to the other side. There is mild to no swelling that can be seen, no ecchymosis over lateral side of the left ankle. She has intact sensation and good pedal pulses. She has no tenderness on deep palpation over the left ankle ATF.  The ankles are stable to drawer test bilaterally, equally.  Ankle reflex 1+ bilaterally. She has mild tenderness to palpation over the left knee medial knee joint. Knee is stable to valgus and varus stress. IMAGING:  Xray's dated 2/10/2021 from [facility],  were reviewed, 3 views of the left ankle and knee, and showed no acute fracture. Ankle mortise in anatomic position. IMPRESSION:    1- Left ankle pain/ Ankle ATF sprain. 2- Left knee pain/ contusion. PLAN:  I assured the patient that the xray is negative for acute fracture. The xray findings were reviewed with the patient. She can be WBAT  and gradually return to normal acitivity and work on peroneal and Quad muscle strength that was given to her by physical therapy. Since she continues to have pain in her left knee, recommend getting an MRI to further evaluate for internal derangement. Follow-up results. She can continue to work full duty with no restrictions.       Sania Mercedes MD

## 2021-04-16 ENCOUNTER — HOSPITAL ENCOUNTER (OUTPATIENT)
Dept: PHYSICAL THERAPY | Age: 51
Setting detail: THERAPIES SERIES
Discharge: HOME | End: 2021-04-16
Payer: COMMERCIAL

## 2021-04-16 PROCEDURE — 97110 THERAPEUTIC EXERCISES: CPT

## 2021-04-16 PROCEDURE — 97530 THERAPEUTIC ACTIVITIES: CPT

## 2021-04-16 NOTE — FLOWSHEET NOTE
[facility]  Phone: [phone]   Fax: [phone]    Physical Therapy Treatment Note/ Progress Report:     Date:  2021    Patient Name:  Moise Murray    :  1970  MRN: [id number]  Restrictions/Precautions:    Medical/Treatment Diagnosis Information:  · Diagnosis: C25.043D (ICD-10-CM) - Sprain of anterior talofibular ligament of left ankle, initial encounter S80. 02XA (ICD-10-CM) - Contusion of left knee, initial encounter  · Treatment Diagnosis: Left knee pain, Left ankle pain, decreased ROM, Decreased strength  Insurance/Certification information:  PT Insurance Information: Workers comp [employer] and [employer] [place]  Physician Information:  Referring Practitioner: Veronica Boswell MD  Plan of care signed (Y/N): [x]  Yes []  No     Date of Patient follow up with Physician:      Progress Report: []  Yes  [x]  No     Date Range for reporting period:  Beginning: 3/18/21  Ending:     Progress report due (10 Rx/or 30 days whichever is less): visit #10 or   (date)     Recertification due (POC duration/ or 90 days whichever is less): visit #12 or 21 (date)     Visit # Insurance Allowable Auth required? Date Range   8/10 10 []  Yes  [x]  No      Latex Allergy:  [x]NO      []YES  Preferred Language for Healthcare:   [x]English       []other:    Functional Scale:        Date assessed:  LEFS: raw score = 58; dysfunction = 27.5%   3/18/21    Pain level: 3-410 4/10 L ant knee (now)     SUBJECTIVE:  Pt. Reports  She did a lot recently - walked the dog about 2 miles on Wed (in 2 walks) and they went fast bc dog was energetic. Then was really sore yesterday and had to drive 1 hr x2 -- made her more sore. She states she saw MD recently --Will get MRI on knee bc it is still hurting.  Pt reports doing HEP consistently        OBJECTIVE:      - not point tenderness      RESTRICTIONS/PRECAUTIONS: hx of DM    Exercises/Interventions:     Therapeutic Exercise (85019)   Start time:830  End time: 858 Resistance / level Sets/sec Reps Notes / Cues     IB-gastroc/IB-soleus  30\"x2/30\"x2   HR/TR  2 15 ea    standing quad stretch - foot on chair   30s 2           Eversion AROM purple 2 10    Inversion AROM purple 2 10                 bike- recumbent L25 min     Step stretches  HS and hip flex   8\"   2x30\" each B                Lateral band stepping green 3 10 steps    Leg press  Calf press DL 80#  DL 80# 2  2 10  10    Soleus press DL 40# 2 10 Max cues for correct performance          Therapeutic Activities (21940)  Start time: 858  End time:908       Pt ed re over the counter foot orthotics - encouraged her to try superfeet light blue bc they are very thin and will fit into many shoes  5'  Gave pt written info. Pt ed re improvement of alignment with insertsStep ups   frd   lat   8\"  8\"   2  2   10  10 Cueing for alignment and control                        Neuromuscular Re-ed (85831)  Start time:908   End time:915       Doming   Cueing to rotate ankle without moving knee   SL balance (shoe off) Fingertip support   Towel toe crunches      BAPS     biodex balance RC L10 3.5'  Occasional UE support   BOSU step   UE support   Trampoline chest pass, SLS on airex basketball 2 10 For improved proprioception and balance          Manual Intervention (13583)  Start time: End time:        Knee mobs/PROM       Tib/Fem Mobs       Patella Mobs      Ankle mobs      STM patella tendon and med/lat ankle      ktape trial:           Modalities: 3/29 ice bag to go for L knee    Pt.  Education:  4/15 review and progress HEP - issued new bands  3/18 Pt educated re pros and cons of over the counter orthotics to improve alignment of ankles and knees  -pt educated on diagnosis, prognosis and expectations for rehab  -all pt questions were answered    Home Exercise Program:  4/16 issued purple tband for ankle x4 and green loop for side step squats  3/18 Access Code: [email or web address]: [email or web address]  Date: 03/18/2021  Prepared by: Yarely Ochoa Sitting Calf Stretch with Strap - 2 x daily - 7 x weekly - 1 sets - 3 reps - 30 hold   Long Sitting Ankle Plantar Flexion with Resistance - 2 x daily - 7 x weekly - 1-2 sets - 10 reps - 1 hold   Ankle Dorsiflexion with Resistance - 2 x daily - 7 x weekly - 1-2 sets - 10 reps - 1 hold   Seated Ankle Inversion with Resistance and Legs Crossed - 2 x daily - 7 x weekly - 1 sets - 10 reps - 1 hold   Long Sitting Ankle Eversion with Resistance - 2 x daily - 7 x weekly - 1-2 sets - 10 reps - 1 hold   Sidelying Quadriceps Stretch - 2 x daily - 7 x weekly - 1 sets - 3 reps - 30 hold      Therapeutic Exercise and NMR EXR  [x] (69021) Provided verbal/tactile cueing for activities related to strengthening, flexibility, endurance, ROM for improvements in LE, proximal hip, and core control with self care, mobility, lifting, ambulation.  [] (08139) Provided verbal/tactile cueing for activities related to improving balance, coordination, kinesthetic sense, posture, motor skill, proprioception  to assist with LE, proximal hip, and core control in self care, mobility, lifting, ambulation and eccentric single leg control.   [] (50341) Therapist is in constant attendance of 2 or more patients providing skilled therapy interventions, but not providing any significant amount of measurable one-on-one time to either patient, for improvements in LE, proximal hip, and core control in self care, mobility, lifting, ambulation and eccentric single leg control.      NMR and Therapeutic Activities:    [x] (30140 or 20493) Provided verbal/tactile cueing for activities related to improving balance, coordination, kinesthetic sense, posture, motor skill, proprioception and motor activation to allow for proper function of core, proximal hip and LE with self care and ADLs  [] (90596) Gait Re-education- Provided training and instruction to the patient for proper LE, core and proximal hip recruitment and positioning and eccentric body weight control with ambulation re-education including up and down stairs     Home Exercise Program:    [x] (04288) Reviewed/Progressed HEP activities related to strengthening, flexibility, endurance, ROM of core, proximal hip and LE for functional self-care, mobility, lifting and ambulation/stair navigation   [] (61969)Reviewed/Progressed HEP activities related to improving balance, coordination, kinesthetic sense, posture, motor skill, proprioception of core, proximal hip and LE for self care, mobility, lifting, and ambulation/stair navigation      Manual Treatments:  PROM / STM / Oscillations-Mobs:  G-I, II, III, IV (PA's, Inf., Post.)  [x] (30287) Provided manual therapy to mobilize LE, proximal hip and/or LS spine soft tissue/joints for the purpose of modulating pain, promoting relaxation,  increasing ROM, reducing/eliminating soft tissue swelling/inflammation/restriction, improving soft tissue extensibility and allowing for proper ROM for normal function with self care, mobility, lifting and ambulation. Modalities:  [] (49709) Vasopneumatic compression: Utilized vasopneumatic compression to decrease edema / swelling for the purpose of improving mobility and quad tone / recruitment which will allow for increased overall function including but not limited to self-care, transfers, ambulation, and ascending / descending stairs.        Charges:   Start time:830 End time: 65  Timed Code Treatment Minutes: 45   Total Treatment Minutes: 45     [] EVAL - LOW (10417)   [] EVAL - MOD (66896)  [] EVAL - HIGH (11834)  [] RE-EVAL (31954)  [x] GI(33282) x 2      [] Ionto  [] NMR (10740) x1       [] Vaso  [] Manual (48413) x       [] Ultrasound  [x] TA x 1       [] Mech Traction (95466)  [] Aquatic Therapy x      [] ES (un) (34815):   [] Home Management Training x  [] ES(attended) (04896)   [] Dry Needling 1-2 muscles (22773):  [] Dry Needling 3+ muscles (424525  [] Group:      [] Other:     GOALS:  Patient stated goal:  Exercising regularly, return to walking longer distances, and wearing heels   []? Progressing: []? Met: []? Not Met: []? Adjusted     Therapist goals for Patient:   Short Term Goals: To be achieved in: 2 weeks  1. Independent in HEP and progression per patient tolerance, in order to prevent re-injury. []? Progressing: []? Met: []? Not Met: []? Adjusted  2. Patient will have a decrease in pain to facilitate improvement in movement, function, and ADLs as indicated by Functional Deficits. []? Progressing: []? Met: []? Not Met: []? Adjusted     Long Term Goals: To be achieved in: 6 weeks  1. Disability index score of 15% or less for the LEFS to assist with reaching prior level of function. []? Progressing: []? Met: []? Not Met: []? Adjusted  2. Patient will demonstrate increased knee AROM to 120 deg or better to allow for proper joint functioning as indicated by patients Functional Deficits. []? Progressing: []? Met: []? Not Met: []? Adjusted  3. Patient will demonstrate an increase in Strength to at least 4+/5 as well as good proximal hip strength and control to allow for proper functional mobility as indicated by patients Functional Deficits. []? Progressing: []? Met: []? Not Met: []? Adjusted  4. Patient will return to functional activities including stair navigation without increased symptoms or restriction. []? Progressing: []? Met: []? Not Met: []? Adjusted  5. Patient will return to regular exercise including ambulating greater than 30min without increase in symptoms or restriction. []? Progressing: []? Met: []? Not Met: []? Adjusted         Overall Progression Towards Functional goals/ Treatment Progress Update:  [] Patient is progressing as expected towards functional goals listed. [] Progression is slowed due to complexities/Impairments listed. [] Progression has been slowed due to co-morbidities.   [x] Plan just implemented, too soon to assess goals progression <30days   [] Goals require adjustment due to lack of progress  [] Patient is not progressing as expected and requires additional follow up with physician  [] Other    Persisting Functional Limitations/Impairments:  []Sitting []Standing   [x]Walking [x]Stairs   []Transfers [x]ADLs   [x]Squatting/bending [x]Kneeling  [x]Housework []Job related tasks  []Driving []Sports/Recreation   [x]Sleeping []Other:    ASSESSMENT:  4/16 good progress. Pt able to pallavi increased resistance for ankle tband and requested green loop tband for HEP. She cont with pain when she overdoes actvity -pt sometimes  cannot tell she is overdoing activity levels until she has done too much    4/13 Pt. Tolerated therapy today without complaints. Progressions noted in bold above. Pt. Demonstrating improved gait throughout clinic and improving control on steps. Pt. Continues to have deficits in proprioception limiting balance, frequent balance checks required. Able to progress resistance with ankle PREs with cueing to decrease compensatory movements. Pt. Will continues to benefit from skilled therapy in order to restore functional strength for return to PLOF. Treatment/Activity Tolerance:  [x] Pt able to complete treatment [] Patient limited by fatique  [] Patient limited by pain  [] Patient limited by other medical complications  [] Other:     Prognosis: [x] Good [] Fair  [] Poor    Patient Requires Follow-up: [x] Yes  [] No    Return to Play:    [x]  N/A   []  Stage 1: Intro to Strength   []  Stage 2: Return to Run and Strength   []  Stage 3: Return to Jump and Strength   []  Stage 4: Dynamic Strength and Agility   []  Stage 5: Sport Specific Training     []  Ready to Return to Play, Meets All Above Stages   []  Not Ready for Return to Sports   Comments:            PLAN: See eval. PT 1-2x / week for 4-6 weeks.    [x] Continue per plan of care [] Alter current plan (see comments)  [] Plan of

## 2021-04-19 ENCOUNTER — TELEPHONE (OUTPATIENT)
Dept: ORTHOPEDIC SURGERY | Age: 51
End: 2021-04-19

## 2021-04-19 NOTE — TELEPHONE ENCOUNTER
Called and spoke with patient to let her know we received approval from [facility] for MRI until 4/30/2021. Pt will call central scheduling to schedule and then f/u in the office for results.

## 2021-04-20 ENCOUNTER — HOSPITAL ENCOUNTER (OUTPATIENT)
Dept: PHYSICAL THERAPY | Age: 51
Setting detail: THERAPIES SERIES
Discharge: HOME | End: 2021-04-20
Payer: COMMERCIAL

## 2021-04-20 PROCEDURE — 97110 THERAPEUTIC EXERCISES: CPT

## 2021-04-20 PROCEDURE — 97112 NEUROMUSCULAR REEDUCATION: CPT

## 2021-04-20 NOTE — FLOWSHEET NOTE
[place], [address]  Phone: [phone]   Fax: [phone]    Physical Therapy Treatment Note/ Progress Report:     Date:  2021    Patient Name:  Denzel Flood    :  1970  MRN: [id number]  Restrictions/Precautions:    Medical/Treatment Diagnosis Information:  · Diagnosis: E33.809C (ICD-10-CM) - Sprain of anterior talofibular ligament of left ankle, initial encounter S80. 02XA (ICD-10-CM) - Contusion of left knee, initial encounter  · Treatment Diagnosis: Left knee pain, Left ankle pain, decreased ROM, Decreased strength  Insurance/Certification information:  PT Insurance Information: Workers comp [employer] and [employer] [place]  Physician Information:  Referring Practitioner: Yu Vazquez MD  Plan of care signed (Y/N): [x]  Yes []  No     Date of Patient follow up with Physician:      Progress Report: []  Yes  [x]  No     Date Range for reporting period:  Beginning: 3/18/21  Ending:     Progress report due (10 Rx/or 30 days whichever is less): visit #10 or   (date)     Recertification due (POC duration/ or 90 days whichever is less): visit #12 or 21 (date)     Visit # Insurance Allowable Auth required? Date Range   9/10 10 []  Yes  [x]  No      Latex Allergy:  [x]NO      []YES  Preferred Language for Healthcare:   [x]English       []other:    Functional Scale:        Date assessed:  LEFS: raw score = 58; dysfunction = 27.5%   3/18/21    Pain level: 1-2/10 L ankle 3/10 L ant knee (now)     SUBJECTIVE:   feeling a lot better/stronger. Reports knee MRI scheduled for this  Pt. Reports  She did a lot recently - walked the dog about 2 miles on Wed (in 2 walks) and they went fast bc dog was energetic. Then was really sore yesterday and had to drive 1 hr x2 -- made her more sore. She states she saw MD recently --Will get MRI on knee bc it is still hurting.  Pt reports doing HEP consistently        OBJECTIVE:      - not point tenderness      RESTRICTIONS/PRECAUTIONS: hx of DM    Exercises/Interventions:     Therapeutic Exercise (76450)   Start time:410  End time: 435 Resistance / level Sets/sec Reps Notes / Cues     IB-gastroc/IB-soleus  30\"x2/30\"x2   HR/TR  2 15 ea    standing quad stretch - foot on chair            Eversion AROM purple    Inversion AROM purple                 bike- recumbent L25 min     Step stretches  HS and hip flex   8\"   2x30\" each B                Lateral band stepping green 3 10 steps    Leg press  Calf press DL 80#  DL 80# 2  2 10  10    Soleus press DL 80# 1 10 Max cues for correct performance          Therapeutic Activities (16086)  Start time: 858  End time:908       Pt ed re over the counter foot orthotics - encouraged her to try superfeet light blue bc they are very thin and will fit into many shoes  Step ups   frd   lat   8\"  8\" Cueing for alignment and control                        Neuromuscular Re-ed (44019)  Start time:435   End time:450       Doming   Cueing to rotate ankle without moving knee   SL balance (shoes on) 10-15 sec 2 eyes open and eyes closed Fingertip support   Towel toe crunches      BAPS: a/p. R/L, CW/CCW L21x10 ea L SLS with R toe on  on BAPS board    biodex balance RC L10 3.5'  Occasional UE support   BOSU step   1 10 R and L UE support   Trampoline chest pass, SLS R/L  on airex basketball 2 10 For improved proprioception and balance          Manual Intervention (49136)  Start time: End time:        Knee mobs/PROM       Tib/Fem Mobs       Patella Mobs      Ankle mobs      STM patella tendon and med/lat ankle      ktape trial:           Modalities: 3/29 ice bag to go for L knee    Pt.  Education:4/20 pt ed re progress toward goals,reveiw and progress HEP, review use of orthotic inserts    4/15 review and progress HEP - issued new bands  3/18 Pt educated re pros and cons of over the counter orthotics to improve alignment of ankles and knees  -pt educated on diagnosis, prognosis and expectations for rehab  -all pt questions were answered    Home Exercise Program:  4/20 SL balance eyes open and eyes closed  4/16 issued purple tband for ankle x4 and green loop for side step squats  3/18 Access Code: [email or web address]: [email or web address]  Date: 03/18/2021  Prepared by: Laura Menon Sitting Calf Stretch with Strap - 2 x daily - 7 x weekly - 1 sets - 3 reps - 30 hold   Long Sitting Ankle Plantar Flexion with Resistance - 2 x daily - 7 x weekly - 1-2 sets - 10 reps - 1 hold   Ankle Dorsiflexion with Resistance - 2 x daily - 7 x weekly - 1-2 sets - 10 reps - 1 hold   Seated Ankle Inversion with Resistance and Legs Crossed - 2 x daily - 7 x weekly - 1 sets - 10 reps - 1 hold   Long Sitting Ankle Eversion with Resistance - 2 x daily - 7 x weekly - 1-2 sets - 10 reps - 1 hold   Sidelying Quadriceps Stretch - 2 x daily - 7 x weekly - 1 sets - 3 reps - 30 hold      Therapeutic Exercise and NMR EXR  [x] (32863) Provided verbal/tactile cueing for activities related to strengthening, flexibility, endurance, ROM for improvements in LE, proximal hip, and core control with self care, mobility, lifting, ambulation.  [] (20062) Provided verbal/tactile cueing for activities related to improving balance, coordination, kinesthetic sense, posture, motor skill, proprioception  to assist with LE, proximal hip, and core control in self care, mobility, lifting, ambulation and eccentric single leg control.   [] (80922) Therapist is in constant attendance of 2 or more patients providing skilled therapy interventions, but not providing any significant amount of measurable one-on-one time to either patient, for improvements in LE, proximal hip, and core control in self care, mobility, lifting, ambulation and eccentric single leg control.      NMR and Therapeutic Activities:    [x] (20726 or 60646) Provided verbal/tactile cueing for activities related to improving balance, coordination, kinesthetic sense, posture, motor skill, proprioception and motor activation to allow for proper function of core, proximal hip and LE with self care and ADLs  [] (95115) Gait Re-education- Provided training and instruction to the patient for proper LE, core and proximal hip recruitment and positioning and eccentric body weight control with ambulation re-education including up and down stairs     Home Exercise Program:    [x] (08633) Reviewed/Progressed HEP activities related to strengthening, flexibility, endurance, ROM of core, proximal hip and LE for functional self-care, mobility, lifting and ambulation/stair navigation   [] (78473)Reviewed/Progressed HEP activities related to improving balance, coordination, kinesthetic sense, posture, motor skill, proprioception of core, proximal hip and LE for self care, mobility, lifting, and ambulation/stair navigation      Manual Treatments:  PROM / STM / Oscillations-Mobs:  G-I, II, III, IV (PA's, Inf., Post.)  [x] (89187) Provided manual therapy to mobilize LE, proximal hip and/or LS spine soft tissue/joints for the purpose of modulating pain, promoting relaxation,  increasing ROM, reducing/eliminating soft tissue swelling/inflammation/restriction, improving soft tissue extensibility and allowing for proper ROM for normal function with self care, mobility, lifting and ambulation. Modalities:  [] (56873) Vasopneumatic compression: Utilized vasopneumatic compression to decrease edema / swelling for the purpose of improving mobility and quad tone / recruitment which will allow for increased overall function including but not limited to self-care, transfers, ambulation, and ascending / descending stairs.        Charges:   Start time:410 End time:450  Timed Code Treatment Minutes: 40   Total Treatment Minutes: 40     [] EVAL - LOW (03944)   [] EVAL - MOD (10538)  [] EVAL - HIGH (38304)  [] RE-EVAL (64892)  [x] RB(52113) x 2    [] Ionto  [x] NMR (86212) x1       [] Vaso  [] Manual ([id number]) x       [] Ultrasound  [] TA x 1       [] Mech Traction (21910)  [] Aquatic Therapy x      [] ES (un) (16480):   [] Home Management Training x  [] ES(attended) (77509)   [] Dry Needling 1-2 muscles (41011):  [] Dry Needling 3+ muscles (367680  [] Group:      [] Other:     GOALS:  Patient stated goal:  Exercising regularly, return to walking longer distances, and wearing heels   []? Progressing: []? Met: []? Not Met: []? Adjusted     Therapist goals for Patient:   Short Term Goals: To be achieved in: 2 weeks  1. Independent in HEP and progression per patient tolerance, in order to prevent re-injury. []? Progressing: []? Met: []? Not Met: []? Adjusted  2. Patient will have a decrease in pain to facilitate improvement in movement, function, and ADLs as indicated by Functional Deficits. []? Progressing: []? Met: []? Not Met: []? Adjusted     Long Term Goals: To be achieved in: 6 weeks  1. Disability index score of 15% or less for the LEFS to assist with reaching prior level of function. [x]? Progressing: []? Met: []? Not Met: []? Adjusted  2. Patient will demonstrate increased knee AROM to 120 deg or better to allow for proper joint functioning as indicated by patients Functional Deficits. [x]? Progressing: []? Met: []? Not Met: []? Adjusted  3. Patient will demonstrate an increase in Strength to at least 4+/5 as well as good proximal hip strength and control to allow for proper functional mobility as indicated by patients Functional Deficits. [x]? Progressing: []? Met: []? Not Met: []? Adjusted  4. Patient will return to functional activities including stair navigation without increased symptoms or restriction. [x]? Progressing: []? Met: []? Not Met: []? Adjusted  5. Patient will return to regular exercise including ambulating greater than 30min without increase in symptoms or restriction. [x]? Progressing: []? Met: []? Not Met: []?  Adjusted         Overall Progression Towards Functional goals/ Strength   []  Stage 4: Dynamic Strength and Agility   []  Stage 5: Sport Specific Training     []  Ready to Return to Play, Meets All Above Stages   []  Not Ready for Return to Sports   Comments:            PLAN: See eval. PT 1-2x / week for 4-6 weeks. [x] Continue per plan of care [] Alter current plan (see comments)  [] Plan of care initiated [] Hold pending MD visit [] Discharge    Electronically signed by: Agapito Shelton PT, DPT      Note: If patient does not return for scheduled/ recommended follow up visits, this note will serve as a discharge from care along with most recent update on progress.

## 2021-04-23 ENCOUNTER — HOSPITAL ENCOUNTER (OUTPATIENT)
Dept: MRI IMAGING | Age: 51
Discharge: HOME | End: 2021-04-23
Payer: COMMERCIAL

## 2021-04-23 DIAGNOSIS — S80.02XA CONTUSION OF LEFT KNEE, INITIAL ENCOUNTER: ICD-10-CM

## 2021-04-23 PROCEDURE — 73721 MRI JNT OF LWR EXTRE W/O DYE: CPT

## 2021-04-30 ENCOUNTER — HOSPITAL ENCOUNTER (OUTPATIENT)
Dept: PHYSICAL THERAPY | Age: 51
Setting detail: THERAPIES SERIES
Discharge: HOME | End: 2021-04-30
Payer: COMMERCIAL

## 2021-04-30 PROCEDURE — 97110 THERAPEUTIC EXERCISES: CPT

## 2021-04-30 PROCEDURE — 97530 THERAPEUTIC ACTIVITIES: CPT

## 2021-04-30 NOTE — PLAN OF CARE
sets - 3 reps - 30 hold      Therapeutic Exercise and NMR EXR  [x] (59306) Provided verbal/tactile cueing for activities related to strengthening, flexibility, endurance, ROM for improvements in LE, proximal hip, and core control with self care, mobility, lifting, ambulation.  [] (94264) Provided verbal/tactile cueing for activities related to improving balance, coordination, kinesthetic sense, posture, motor skill, proprioception  to assist with LE, proximal hip, and core control in self care, mobility, lifting, ambulation and eccentric single leg control.   [] (31007) Therapist is in constant attendance of 2 or more patients providing skilled therapy interventions, but not providing any significant amount of measurable one-on-one time to either patient, for improvements in LE, proximal hip, and core control in self care, mobility, lifting, ambulation and eccentric single leg control.      NMR and Therapeutic Activities:    [x] (09788 or 84997) Provided verbal/tactile cueing for activities related to improving balance, coordination, kinesthetic sense, posture, motor skill, proprioception and motor activation to allow for proper function of core, proximal hip and LE with self care and ADLs  [] (57772) Gait Re-education- Provided training and instruction to the patient for proper LE, core and proximal hip recruitment and positioning and eccentric body weight control with ambulation re-education including up and down stairs     Home Exercise Program:    [x] (13498) Reviewed/Progressed HEP activities related to strengthening, flexibility, endurance, ROM of core, proximal hip and LE for functional self-care, mobility, lifting and ambulation/stair navigation   [] (82030)Reviewed/Progressed HEP activities related to improving balance, coordination, kinesthetic sense, posture, motor skill, proprioception of core, proximal hip and LE for self care, mobility, lifting, and ambulation/stair navigation      Manual Treatments: PROM / STM / Oscillations-Mobs:  G-I, II, III, IV (PA's, Inf., Post.)  [x] (47660) Provided manual therapy to mobilize LE, proximal hip and/or LS spine soft tissue/joints for the purpose of modulating pain, promoting relaxation,  increasing ROM, reducing/eliminating soft tissue swelling/inflammation/restriction, improving soft tissue extensibility and allowing for proper ROM for normal function with self care, mobility, lifting and ambulation. Modalities:  [] (32624) Vasopneumatic compression: Utilized vasopneumatic compression to decrease edema / swelling for the purpose of improving mobility and quad tone / recruitment which will allow for increased overall function including but not limited to self-care, transfers, ambulation, and ascending / descending stairs. Charges:   Start time:830 End time:915  Timed Code Treatment Minutes: 45   Total Treatment Minutes: 45     [] EVAL - LOW (22267)   [] EVAL - MOD (32759)  [] EVAL - HIGH (67576)  [] RE-EVAL (71657)  [x] MC(91356) x 2    [] Ionto  [] NMR (43539) x1       [] Vaso  [] Manual (24415) x       [] Ultrasound  [x] TA x 1       [] Mech Traction (69046)  [] Aquatic Therapy x      [] ES (un) (66915):   [] Home Management Training x  [] ES(attended) (51508)   [] Dry Needling 1-2 muscles (01435):  [] Dry Needling 3+ muscles (463300  [] Group:      [] Other:     GOALS:  Patient stated goal:  Exercising regularly, return to walking longer distances, and wearing heels   []? Progressing: []? Met: []? Not Met: []? Adjusted     Therapist goals for Patient:   Short Term Goals: To be achieved in: 2 weeks  1. Independent in HEP and progression per patient tolerance, in order to prevent re-injury. [x]? Progressing: []? Met: []? Not Met: []? Adjusted  2. Patient will have a decrease in pain to facilitate improvement in movement, function, and ADLs as indicated by Functional Deficits. [x]? Progressing: []? Met: []? Not Met: []? Adjusted     Long Term Goals:  To be achieved in: 6 weeks  1. Disability index score of 15% or less for the LEFS to assist with reaching prior level of function. [x]? Progressing: []? Met: []? Not Met: []? Adjusted  2. Patient will demonstrate increased knee AROM to 120 deg or better to allow for proper joint functioning as indicated by patients Functional Deficits. [x]? Progressing: []? Met: []? Not Met: []? Adjusted  3. Patient will demonstrate an increase in Strength to at least 4+/5 as well as good proximal hip strength and control to allow for proper functional mobility as indicated by patients Functional Deficits. [x]? Progressing: []? Met: []? Not Met: []? Adjusted  4. Patient will return to functional activities including stair navigation without increased symptoms or restriction. [x]? Progressing: []? Met: []? Not Met: []? Adjusted  5. Patient will return to regular exercise including ambulating greater than 30min without increase in symptoms or restriction. [x]? Progressing: []? Met: []? Not Met: []? Adjusted         Overall Progression Towards Functional goals/ Treatment Progress Update:  [x] Patient is progressing as expected towards functional goals listed. [] Progression is slowed due to complexities/Impairments listed. [] Progression has been slowed due to co-morbidities. [x] Plan just implemented, too soon to assess goals progression <30days   [] Goals require adjustment due to lack of progress  [] Patient is not progressing as expected and requires additional follow up with physician  [] Other    Persisting Functional Limitations/Impairments:  []Sitting []Standing   [x]Walking [x]Stairs   []Transfers [x]ADLs   [x]Squatting/bending [x]Kneeling  [x]Housework []Job related tasks  []Driving []Sports/Recreation   [x]Sleeping []Other:    ASSESSMENT: 4/30 -- see above   4/20 excellent progress. Cont to need high level balance and strength gains  for return to PLOF  4/16 good progress.   Pt able to pallavi increased resistance for ankle tband and requested green loop tband for HEP. She cont with pain when she overdoes actvity -pt sometimes  cannot tell she is overdoing activity levels until she has done too much    4/13 Pt. Tolerated therapy today without complaints. Progressions noted in bold above. Pt. Demonstrating improved gait throughout clinic and improving control on steps. Pt. Continues to have deficits in proprioception limiting balance, frequent balance checks required. Able to progress resistance with ankle PREs with cueing to decrease compensatory movements. Pt. Will continues to benefit from skilled therapy in order to restore functional strength for return to PLOF. Treatment/Activity Tolerance:  [x] Pt able to complete treatment [] Patient limited by fatique  [] Patient limited by pain  [] Patient limited by other medical complications  [] Other:     Prognosis: [x] Good [] Fair  [] Poor    Patient Requires Follow-up: [x] Yes  [] No    Return to Play:    [x]  N/A   []  Stage 1: Intro to Strength   []  Stage 2: Return to Run and Strength   []  Stage 3: Return to Jump and Strength   []  Stage 4: Dynamic Strength and Agility   []  Stage 5: Sport Specific Training     []  Ready to Return to Play, Meets All Above Stages   []  Not Ready for Return to Sports   Comments:            PLAN: See eval. PT 1-2x / week for 4-6 weeks. [x] Continue per plan of care [] Alter current plan (see comments)  [] Plan of care initiated [] Hold pending MD visit [] Discharge    Electronically signed by: Mónica Triplett PT, DPT      Note: If patient does not return for scheduled/ recommended follow up visits, this note will serve as a discharge from care along with most recent update on progress.

## 2021-05-04 ENCOUNTER — TELEPHONE (OUTPATIENT)
Dept: ORTHOPEDIC SURGERY | Age: 51
End: 2021-05-04

## 2021-05-11 ENCOUNTER — OFFICE VISIT (OUTPATIENT)
Dept: ORTHOPEDIC SURGERY | Age: 51
End: 2021-05-11
Payer: COMMERCIAL

## 2021-05-11 VITALS — WEIGHT: 250 LBS | BODY MASS INDEX: 37.89 KG/M2 | HEIGHT: 68 IN

## 2021-05-11 DIAGNOSIS — S93.492D SPRAIN OF ANTERIOR TALOFIBULAR LIGAMENT OF LEFT ANKLE, SUBSEQUENT ENCOUNTER: ICD-10-CM

## 2021-05-11 DIAGNOSIS — S80.02XA CONTUSION OF LEFT KNEE, INITIAL ENCOUNTER: Primary | ICD-10-CM

## 2021-05-11 PROCEDURE — 99214 OFFICE O/P EST MOD 30 MIN: CPT | Performed by: ORTHOPAEDIC SURGERY

## 2021-05-11 NOTE — PROGRESS NOTES
CHIEF COMPLAINT:   1- Left ankle pain/ Ankle ATF sprain. 2- Left knee pain/ contusion. DATE OF INJURY: 2/10/2021, Worker's Comp    HISTORY:  Ms. Renner Katherine 45 y.o.  female presents today for follow-up visit for evaluation of a left ankle and knee injury which occurred when she was walking at work parking lot and slipped and fell on her left knee and ankle. She states her ankle pain is much better and she can now walk her down and walking down steps is not near as painful. She states her left knee pain is improved. She rates her pain a 0/10 VAS in her left knee. She went to [facility] ER because of the pain. She was told that she has a sprain, splint was applied and asked to f/u with Orthopedics. She reports today no pain. No numbness or tingling sensation, and no other complaint. She has been back to work full duty and works from home.     Past Medical History:   Diagnosis Date    Diabetes [facility])         Past Surgical History:   Procedure Laterality Date    BREAST REDUCTION SURGERY       SECTION  80    CHOLECYSTECTOMY      OTHER SURGICAL HISTORY  2015    EXCISION OF HYDRADENITIS RIGHT AXILLA         Social History     Socioeconomic History    Marital status:      Spouse name: Not on file    Number of children: Not on file    Years of education: Not on file    Highest education level: Not on file   Occupational History    Not on file   Social Needs    Financial resource strain: Not on file    Food insecurity     Worry: Not on file     Inability: Not on file    Transportation needs     Medical: Not on file     Non-medical: Not on file   Tobacco Use    Smoking status: Never Smoker    Smokeless tobacco: Never Used   Substance and Sexual Activity    Alcohol use: No    Drug use: No    Sexual activity: Not on file   Lifestyle    Physical activity     Days per week: Not on file     Minutes per session: Not on file    Stress: Not on file   Relationships    Social connections Talks on phone: Not on file     Gets together: Not on file     Attends Tenriism service: Not on file     Active member of club or organization: Not on file     Attends meetings of clubs or organizations: Not on file     Relationship status: Not on file    Intimate partner violence     Fear of current or ex partner: Not on file     Emotionally abused: Not on file     Physically abused: Not on file     Forced sexual activity: Not on file   Other Topics Concern    Not on file   Social History Narrative    Not on file        Family History   Problem Relation Age of Onset    Diabetes Mother     High Blood Pressure Mother     Diabetes Father     Cancer Father         multiple myeloma    High Blood Pressure Father         Pertinent items are noted in HPI  Review of systems reviewed from Patient History Form dated on 2/16/2021 and available in the patient's chart under the Media tab. No change. PHYSICAL EXAM:  Ms. Tyrone Rand is a very pleasant 45 y.o.  female who presents today in no acute distress, awake, alert, and oriented. She is well dressed, nourished and  groomed. Patient with normal affect. Height is  5' 8\" (1.727 m), weight is 250 lb (113.4 kg). Resting respiratory rate is 16. Examination of the gait, showed that the patient walks with no limp, WB left leg. Examination of both ankles showing a full range of motion of the left ankle and full range of motion left knee compare to the other side. There is mild to no swelling that can be seen, no ecchymosis over lateral side of the left ankle. She has intact sensation and good pedal pulses. She has no tenderness on deep palpation over the left ankle ATF. The ankles are stable to drawer test bilaterally, equally.  Ankle reflex 1+ bilaterally. She has mild tenderness to palpation over the left knee medial knee joint. Knee is stable to valgus and varus stress.     IMAGING:  Xray's dated 2/10/2021 from [facility],  were reviewed, 3 views of the left ankle and knee, and showed no acute fracture. Ankle mortise in anatomic position. MRI left knee dated 4/23/2021 was reviewed and showed     1. Degenerative fraying with small tear along the inner 1/3 of the   body-posterior horn lateral meniscus. 2. Minimal cartilage fissuring.  Otherwise no significant degenerative change. 3. Minimal nonspecific marrow edema in the posteromedial tibial plateau.  No   acute fracture. IMPRESSION:    1- Left ankle pain/ Ankle ATF sprain. 2- Left knee pain/ contusion. PLAN:   I discussed with Belen Barbosa the MRI and the treatment options including both surgical and non-surgical treatment, and that my recommendation is to gradually return to normal acitivity and work on peroneal and Quad muscle strength that was given to her by physical therapy. She can continue to work full duty with no restrictions. F/U PRN.       Janna Echevarria MD

## 2021-06-09 ENCOUNTER — OFFICE VISIT (OUTPATIENT)
Dept: PRIMARY CARE CLINIC | Age: 51
End: 2021-06-09
Payer: COMMERCIAL

## 2021-06-09 VITALS
HEART RATE: 90 BPM | HEIGHT: 68 IN | BODY MASS INDEX: 35.77 KG/M2 | DIASTOLIC BLOOD PRESSURE: 70 MMHG | OXYGEN SATURATION: 98 % | SYSTOLIC BLOOD PRESSURE: 130 MMHG | WEIGHT: 236 LBS

## 2021-06-09 DIAGNOSIS — Z23 NEED FOR TDAP VACCINATION: ICD-10-CM

## 2021-06-09 DIAGNOSIS — L73.2 HIDRADENITIS AXILLARIS: ICD-10-CM

## 2021-06-09 DIAGNOSIS — E11.9 TYPE 2 DIABETES MELLITUS WITHOUT COMPLICATION, WITH LONG-TERM CURRENT USE OF INSULIN (HCC): ICD-10-CM

## 2021-06-09 DIAGNOSIS — Z23 NEED FOR SHINGLES VACCINE: ICD-10-CM

## 2021-06-09 DIAGNOSIS — Z13.220 LIPID SCREENING: ICD-10-CM

## 2021-06-09 DIAGNOSIS — E11.9 TYPE 2 DIABETES MELLITUS WITHOUT COMPLICATION, WITH LONG-TERM CURRENT USE OF INSULIN (HCC): Primary | ICD-10-CM

## 2021-06-09 DIAGNOSIS — Z79.4 TYPE 2 DIABETES MELLITUS WITHOUT COMPLICATION, WITH LONG-TERM CURRENT USE OF INSULIN (HCC): ICD-10-CM

## 2021-06-09 DIAGNOSIS — Z79.4 TYPE 2 DIABETES MELLITUS WITHOUT COMPLICATION, WITH LONG-TERM CURRENT USE OF INSULIN (HCC): Primary | ICD-10-CM

## 2021-06-09 LAB
A/G RATIO: 1.6 (ref 1.1–2.2)
ALBUMIN SERPL-MCNC: 4.5 G/DL (ref 3.4–5)
ALP BLD-CCNC: 95 U/L (ref 40–129)
ALT SERPL-CCNC: 16 U/L (ref 10–40)
ANION GAP SERPL CALCULATED.3IONS-SCNC: 15 MMOL/L (ref 3–16)
AST SERPL-CCNC: 24 U/L (ref 15–37)
BILIRUB SERPL-MCNC: <0.2 MG/DL (ref 0–1)
BUN BLDV-MCNC: 10 MG/DL (ref 7–20)
CALCIUM SERPL-MCNC: 9.2 MG/DL (ref 8.3–10.6)
CHLORIDE BLD-SCNC: 100 MMOL/L (ref 99–110)
CHOLESTEROL, FASTING: 146 MG/DL (ref 0–199)
CO2: 24 MMOL/L (ref 21–32)
CREAT SERPL-MCNC: 0.6 MG/DL (ref 0.6–1.1)
CREATININE URINE POCT: 100
GFR AFRICAN AMERICAN: >60
GFR NON-AFRICAN AMERICAN: >60
GLOBULIN: 2.9 G/DL
GLUCOSE FASTING: 110 MG/DL (ref 70–99)
HBA1C MFR BLD: 6.3 %
HDLC SERPL-MCNC: 60 MG/DL (ref 40–60)
LDL CHOLESTEROL CALCULATED: 74 MG/DL
MICROALBUMIN/CREAT 24H UR: 10 MG/G{CREAT}
MICROALBUMIN/CREAT UR-RTO: 30
POTASSIUM SERPL-SCNC: 5.1 MMOL/L (ref 3.5–5.1)
SODIUM BLD-SCNC: 139 MMOL/L (ref 136–145)
TOTAL CK: 101 U/L (ref 26–192)
TOTAL PROTEIN: 7.4 G/DL (ref 6.4–8.2)
TRIGLYCERIDE, FASTING: 60 MG/DL (ref 0–150)
VLDLC SERPL CALC-MCNC: 12 MG/DL

## 2021-06-09 PROCEDURE — 82044 UR ALBUMIN SEMIQUANTITATIVE: CPT | Performed by: NURSE PRACTITIONER

## 2021-06-09 PROCEDURE — 90750 HZV VACC RECOMBINANT IM: CPT | Performed by: NURSE PRACTITIONER

## 2021-06-09 PROCEDURE — 90472 IMMUNIZATION ADMIN EACH ADD: CPT | Performed by: NURSE PRACTITIONER

## 2021-06-09 PROCEDURE — 90471 IMMUNIZATION ADMIN: CPT | Performed by: NURSE PRACTITIONER

## 2021-06-09 PROCEDURE — 90715 TDAP VACCINE 7 YRS/> IM: CPT | Performed by: NURSE PRACTITIONER

## 2021-06-09 PROCEDURE — 83036 HEMOGLOBIN GLYCOSYLATED A1C: CPT | Performed by: NURSE PRACTITIONER

## 2021-06-09 PROCEDURE — 99204 OFFICE O/P NEW MOD 45 MIN: CPT | Performed by: NURSE PRACTITIONER

## 2021-06-09 RX ORDER — ATORVASTATIN CALCIUM 10 MG/1
10 TABLET, FILM COATED ORAL DAILY
Qty: 90 TABLET | Refills: 0 | Status: SHIPPED | OUTPATIENT
Start: 2021-06-09 | End: 2021-09-09 | Stop reason: SDUPTHER

## 2021-06-09 RX ORDER — GLIPIZIDE 5 MG/1
5 TABLET ORAL DAILY
Qty: 90 TABLET | Refills: 0 | Status: SHIPPED | OUTPATIENT
Start: 2021-06-09 | End: 2021-09-09

## 2021-06-09 RX ORDER — ATORVASTATIN CALCIUM 10 MG/1
10 TABLET, FILM COATED ORAL DAILY
COMMUNITY
End: 2021-06-09 | Stop reason: SDUPTHER

## 2021-06-09 RX ORDER — GLIPIZIDE 5 MG/1
5 TABLET ORAL DAILY
COMMUNITY
End: 2021-06-09 | Stop reason: SDUPTHER

## 2021-06-09 RX ORDER — CLINDAMYCIN HYDROCHLORIDE 300 MG/1
300 CAPSULE ORAL 3 TIMES DAILY
Qty: 21 CAPSULE | Refills: 2 | Status: SHIPPED | OUTPATIENT
Start: 2021-06-09 | End: 2021-06-16

## 2021-06-09 ASSESSMENT — PATIENT HEALTH QUESTIONNAIRE - PHQ9
1. LITTLE INTEREST OR PLEASURE IN DOING THINGS: 0
SUM OF ALL RESPONSES TO PHQ QUESTIONS 1-9: 0
SUM OF ALL RESPONSES TO PHQ9 QUESTIONS 1 & 2: 0
SUM OF ALL RESPONSES TO PHQ QUESTIONS 1-9: 0
2. FEELING DOWN, DEPRESSED OR HOPELESS: 0
SUM OF ALL RESPONSES TO PHQ QUESTIONS 1-9: 0

## 2021-06-09 ASSESSMENT — ENCOUNTER SYMPTOMS
CHEST TIGHTNESS: 0
VISUAL CHANGE: 0
GASTROINTESTINAL NEGATIVE: 1
SHORTNESS OF BREATH: 0
BLURRED VISION: 0
APNEA: 0

## 2021-06-09 NOTE — PATIENT INSTRUCTIONS
Patient Education        Learning About Meal Planning for Diabetes  Why plan your meals? Meal planning can be a key part of managing diabetes. Planning meals and snacks with the right balance of carbohydrate, protein, and fat can help you keep your blood sugar at the target level you set with your doctor. You don't have to eat special foods. You can eat what your family eats, including sweets once in a while. But you do have to pay attention to how often you eat and how much you eat of certain foods. You may want to work with a dietitian or a certified diabetes educator. He or she can give you tips and meal ideas and can answer your questions about meal planning. This health professional can also help you reach a healthy weight if that is one of your goals. What plan is right for you? Your dietitian or diabetes educator may suggest that you start with the plate format or carbohydrate counting. The plate format  The plate format is a simple way to help you manage how you eat. You plan meals by learning how much space each food should take on a plate. Using the plate format helps you spread carbohydrate throughout the day. It can make it easier to keep your blood sugar level within your target range. It also helps you see if you're eating healthy portion sizes. To use the plate format, you put non-starchy vegetables on half your plate. Add meat or meat substitutes on one-quarter of the plate. Put a grain or starchy vegetable (such as brown rice or a potato) on the final quarter of the plate. You can add a small piece of fruit and some low-fat or fat-free milk or yogurt, depending on your carbohydrate goal for each meal.  Here are some tips for using the plate format:  · Make sure that you are not using an oversized plate. A 9-inch plate is best. Many restaurants use larger plates. · Get used to using the plate format at home. Then you can use it when you eat out.   · Write down your questions about using the plate format. Talk to your doctor, a dietitian, or a diabetes educator about your concerns. Carbohydrate counting  With carbohydrate counting, you plan meals based on the amount of carbohydrate in each food. Carbohydrate raises blood sugar higher and more quickly than any other nutrient. It is found in desserts, breads and cereals, and fruit. It's also found in starchy vegetables such as potatoes and corn, grains such as rice and pasta, and milk and yogurt. Spreading carbohydrate throughout the day helps keep your blood sugar levels within your target range. Your daily amount depends on several things, including your weight, how active you are, which diabetes medicines you take, and what your goals are for your blood sugar levels. A registered dietitian or diabetes educator can help you plan how much carbohydrate to include in each meal and snack. A guideline for your daily amount of carbohydrate is:  · 45 to 60 grams at each meal. That's about the same as 3 to 4 carbohydrate servings. · 15 to 20 grams at each snack. That's about the same as 1 carbohydrate serving. The Nutrition Facts label on packaged foods tells you how much carbohydrate is in a serving of the food. First, look at the serving size on the food label. Is that the amount you eat in a serving? All of the nutrition information on a food label is based on that serving size. So if you eat more or less than that, you'll need to adjust the other numbers. Total carbohydrate is the next thing you need to look for on the label. If you count carbohydrate servings, one serving of carbohydrate is 15 grams. For foods that don't come with labels, such as fresh fruits and vegetables, you'll need a guide that lists carbohydrate in these foods. Ask your doctor, dietitian, or diabetes educator about books or other nutrition guides you can use.   If you take insulin, you need to know how many grams of carbohydrate are in a meal. This lets you know how much rapid-acting insulin to take before you eat. If you use an insulin pump, you get a constant rate of insulin during the day. So the pump must be programmed at meals to give you extra insulin to cover the rise in blood sugar after meals. When you know how much carbohydrate you will eat, you can take the right amount of insulin. Or, if you always use the same amount of insulin, you need to make sure that you eat the same amount of carbohydrate at meals. If you need more help to understand carbohydrate counting and food labels, ask your doctor, dietitian, or diabetes educator. How can you plan healthy meals? Here are some tips to get started:  · Plan your meals a week at a time. Don't forget to include snacks too. · Use cookbooks or online recipes to plan several main meals. Plan some quick meals for busy nights. You also can double some recipes that freeze well. Then you can save half for other busy nights when you don't have time to cook. · Make sure you have the ingredients you need for your recipes. If you're running low on basic items, put these items on your shopping list too. · List foods that you use to make breakfasts, lunches, and snacks. List plenty of fruits and vegetables. · Post this list on the refrigerator. Add to it as you think of more things you need. · Take the list to the store to do your weekly shopping. Follow-up care is a key part of your treatment and safety. Be sure to make and go to all appointments, and call your doctor if you are having problems. It's also a good idea to know your test results and keep a list of the medicines you take. Where can you learn more? Go to https://romanewkamilla.[email or web address] org and sign in to your Wi-Chi account. Enter A469 in the Vitrina box to learn more about \"Learning About Meal Planning for Diabetes. \"     If you do not have an account, please click on the \"Sign Up Now\" link.   Current as of: August 31, 2020               Content Version: 12.8  © 2006-2021 Healthwise, [employer]. Care instructions adapted under license by [facility] [place] ([facility]). If you have questions about a medical condition or this instruction, always ask your healthcare professional. [address] any warranty or liability for your use of this information. Patient Education        Diabetes Blood Sugar Emergencies: Your Action Plan  How can you prevent a blood sugar emergency? An important part of living with diabetes is keeping your blood sugar in your target range. You'll need to know what to do if it's too high or too low. Managing your blood sugar levels helps you avoid emergencies. This care sheet will teach you about the signs of high and low blood sugar. It will help you make an action plan with your doctor for when these signs occur. Low blood sugar is more likely to happen if you take certain medicines for diabetes. It can also happen if you skip a meal, drink alcohol, or exercise more than usual.  You may get high blood sugar if you eat differently than you normally do. One example is eating more carbohydrate than usual. Having a cold, the flu, or other sudden illness can also cause high blood sugar levels. Levels can also rise if you miss a dose of medicine. Any change in how you take your medicine may affect your blood sugar level. So it's important to work with your doctor before you make any changes. Track your blood sugar  Work with your doctor to fill in the blank spaces below that apply to you. Track your levels, know your target range, and write down ways you can get your blood sugar back in your target range. A log book can help you track your levels. Take the book to all of your medical appointments. · Check your blood sugar _____ times a day, at these times:________________________________________________.   (For example: Before meals, at bedtime, before exercise, during exercise, other.)  · Your blood sugar target range before a meal is ___________________. Your blood sugar target range after a meal is _______________________. · Do this___________________________________________________to get your blood sugar back within your safe range if your blood sugar results are _________________________________________. (For example: Less than 70 or above 250 mg/dL.)  Call your doctor when your blood sugar results are ___________________________________. (For example: Less than 70 or above 250 mg/dL.)  What are the symptoms of low and high blood sugar? Common symptoms of low blood sugar are sweating and feeling shaky, weak, hungry, or confused. Symptoms can start quickly. Common symptoms of high blood sugar are feeling very thirsty or very hungry. You may also pass urine more often than usual. You may have blurry vision and may lose weight without trying. But some people may have high or low blood sugar without having any symptoms. That's a good reason to check your blood sugar on a regular schedule. What should you do if you have symptoms? Work with your doctor to fill in the blank spaces below that apply to you. Low blood sugar  If you have symptoms of low blood sugar, check your blood sugar. If it's below _____ ( for example, below 70), eat or drink a quick-sugar food that has about 15 grams of carbohydrate. Your goal is to get your level back to your safe range. Check your blood sugar again 15 minutes later. If it's still not in your target range, take another 15 grams of carbohydrate and check your blood sugar again in 15 minutes. Repeat this until you reach your target. Then go back to your regular testing schedule. Children usually need less than 15 grams of carbohydrate. Check with your doctor or diabetes educator for the amount that is right for your child. When you have low blood sugar, it's best to stop or reduce any physical activity until your blood sugar is back in your target range and is stable.  If you must stay active, eat or drink 30 grams of carbohydrate. Then check your blood sugar again in 15 minutes. If it's not in your target range, take another 30 grams of carbohydrates. Check your blood sugar again in 15 minutes. Keep doing this until you reach your target. You can then go back to your regular testing schedule. If your symptoms or blood sugar levels are getting worse or have not improved after 15 minutes, seek medical care right away. Here are some examples of quick-sugar foods with 15 grams of carbohydrate:  · 3 or 4 glucose tablets  · 1 tablespoon (3 teaspoons) table sugar  · ½ cup to ¾ cup (4 to 6 ounces) of fruit juice or regular (not diet) soda  · Hard candy (such as 6 Life Savers)  High blood sugar  If you have symptoms of high blood sugar, check your blood sugar. Your goal is to get your level back to your target range. If it's above ______ ( for example, above 250), follow these steps:  · If you missed a dose of your diabetes medicine, take it now. Take only the amount of medicine that you have been prescribed. Do not take more or less medicine. · Give yourself insulin if your doctor has prescribed it for high blood sugar. · Test for ketones, if the doctor told you to do so. If the results of the ketone test show a moderate-to-large amount of ketones, call the doctor for advice. · Wait 30 minutes after you take the extra insulin or the missed medicine. Check your blood sugar again. If your symptoms or blood sugar levels are getting worse or have not improved after taking these steps, seek medical care right away. Follow-up care is a key part of your treatment and safety. Be sure to make and go to all appointments, and call your doctor if you are having problems. It's also a good idea to know your test results and keep a list of the medicines you take. Where can you learn more? Go to https://sung.[email or web address] org and sign in to your ID Analytics account.  Enter Z880 in the [address] box to learn more about \"Diabetes Blood Sugar Emergencies: Your Action Plan. \"     If you do not have an account, please click on the \"Sign Up Now\" link. Current as of: August 31, 2020               Content Version: 12.8  © 2006-2021 Healthwise, [employer]. Care instructions adapted under license by [facility] ([facility]). If you have questions about a medical condition or this instruction, always ask your healthcare professional. [address] any warranty or liability for your use of this information. Patient Education        Learning About Carbohydrate (Carb) Counting and Eating Out When You Have Diabetes  Why plan your meals? Meal planning can be a key part of managing diabetes. Planning meals and snacks with the right balance of carbohydrate, protein, and fat can help you keep your blood sugar at the target level you set with your doctor. You don't have to eat special foods. You can eat what your family eats, including sweets once in a while. But you do have to pay attention to how often you eat and how much you eat of certain foods. You may want to work with a dietitian or a certified diabetes educator. He or she can give you tips and meal ideas and can answer your questions about meal planning. This health professional can also help you reach a healthy weight if that is one of your goals. What should you know about eating carbs? Managing the amount of carbohydrate (carbs) you eat is an important part of healthy meals when you have diabetes. Carbohydrate is found in many foods. · Learn which foods have carbs. And learn the amounts of carbs in different foods. ? Bread, cereal, pasta, and rice have about 15 grams of carbs in a serving. A serving is 1 slice of bread (1 ounce), ½ cup of cooked cereal, or 1/3 cup of cooked pasta or rice. ? Fruits have 15 grams of carbs in a serving.  A serving is 1 small fresh fruit, such as an apple or orange; ½ of a banana; ½ cup of Inc[employer] disclaims any warranty or liability for your use of this information. Patient Education        Diabetes Foot Health: Care Instructions  Your Care Instructions     When you have diabetes, your feet need extra care and attention. Diabetes can damage the nerve endings and blood vessels in your feet, making you less likely to notice when your feet are injured. Diabetes also limits your body's ability to fight infection and get blood to areas that need it. If you get a minor foot injury, it could become an ulcer or a serious infection. With good foot care, you can prevent most of these problems. Caring for your feet can be quick and easy. Most of the care can be done when you are bathing or getting ready for bed. Follow-up care is a key part of your treatment and safety. Be sure to make and go to all appointments, and call your doctor if you are having problems. It's also a good idea to know your test results and keep a list of the medicines you take. How can you care for yourself at home? · Keep your blood sugar close to normal by watching what and how much you eat, monitoring blood sugar, taking medicines if prescribed, and getting regular exercise. · Do not smoke. Smoking affects blood flow and can make foot problems worse. If you need help quitting, talk to your doctor about stop-smoking programs and medicines. These can increase your chances of quitting for good. · Eat a diet that is low in fats. High fat intake can cause fat to build up in your blood vessels and decrease blood flow. · Inspect your feet daily for blisters, cuts, cracks, or sores. If you cannot see well, use a mirror or have someone help you. · Take care of your feet:  ? Wash your feet every day. Use warm (not hot) water. Check the water temperature with your wrists or other part of your body, not your feet. ? Dry your feet well. Pat them dry. Do not rub the skin on your feet too hard. Dry well between your toes.  If the skin on your feet stays moist, bacteria or a fungus can grow, which can lead to infection. ? Keep your skin soft. Use moisturizing skin cream to keep the skin on your feet soft and prevent calluses and cracks. But do not put the cream between your toes, and stop using any cream that causes a rash. ? Clean underneath your toenails carefully. Do not use a sharp object to clean underneath your toenails. Use the blunt end of a nail file or other rounded tool. ? Trim and file your toenails straight across to prevent ingrown toenails. Use a nail clipper, not scissors. Use an emery board to smooth the edges. · Change socks daily. Socks without seams are best, because seams often rub the feet. You can find socks for people with diabetes from specialty catalogs. · Look inside your shoes every day for things like gravel or torn linings, which could cause blisters or sores. · Buy shoes that fit well:  ? Look for shoes that have plenty of space around the toes. This helps prevent bunions and blisters. ? Try on shoes while wearing the kind of socks you will usually wear with the shoes. ? Avoid plastic shoes. They may rub your feet and cause blisters. Good shoes should be made of materials that are flexible and breathable, such as leather or cloth. ? Break in new shoes slowly by wearing them for no more than an hour a day for several days. Take extra time to check your feet for red areas, blisters, or other problems after you wear new shoes. · Do not go barefoot. Do not wear sandals, and do not wear shoes with very thin soles. Thin soles are easy to puncture. They also do not protect your feet from hot pavement or cold weather. · Have your doctor check your feet during each visit. If you have a foot problem, see your doctor. Do not try to treat an early foot problem at home. Home remedies or treatments that you can buy without a prescription (such as corn removers) can be harmful.   · Always get early treatment for foot problems. A minor irritation can lead to a major problem if not properly cared for early. When should you call for help? Call your doctor now or seek immediate medical care if:    · You have a foot sore, an ulcer or break in the skin that is not healing after 4 days, bleeding corns or calluses, or an ingrown toenail.     · You have blue or black areas, which can mean bruising or blood flow problems.     · You have peeling skin or tiny blisters between your toes or cracking or oozing of the skin.     · You have a fever for more than 24 hours and a foot sore.     · You have new numbness or tingling in your feet that does not go away after you move your feet or change positions.     · You have unexplained or unusual swelling of the foot or ankle. Watch closely for changes in your health, and be sure to contact your doctor if:    · You cannot do proper foot care. Where can you learn more? Go to https://ILANTUS Technologies.Apakau. org and sign in to your Yesware account. Enter A739 in the Clarimedix box to learn more about \"Diabetes Foot Health: Care Instructions. \"     If you do not have an account, please click on the \"Sign Up Now\" link. Current as of: August 31, 2020               Content Version: 12.8  © 2006-2021 [employer]. Care instructions adapted under license by [facility] ([facility]). If you have questions about a medical condition or this instruction, always ask your healthcare professional. [place] [address] any warranty or liability for your use of this information. Patient Education        Diabetes Sick-Day Plan: Care Instructions  Your Care Instructions     If you have diabetes, many other illnesses can make your blood sugar go up. This can be dangerous. When you are sick with the flu or another illness, your body releases hormones to fight infection. These hormones raise blood sugar levels.  They also make it hard for insulin or other medicines to lower your blood sugar. Work with your doctor to make a plan for what to do on days when you are sick. Follow-up care is a key part of your treatment and safety. Be sure to make and go to all appointments, and call your doctor if you are having problems. It's also a good idea to know your test results and keep a list of the medicines you take. How can you care for yourself at home? · Work with your doctor to write up a sick-day plan for what to do on days when you are sick. Your blood sugar can go up or down, depending on your illness and whether you can keep food down. Call your doctor when you are sick. Ask if you need to adjust your pills or insulin. · Write down the diabetes medicines you have been taking and whether you have changed the dose based on your sick-day plan. Have this information ready when you call your doctor. · Eat your normal types and amounts of food. Drink extra fluids, such as water, broth, and fruit juice, to prevent dehydration. ? If your blood sugar level is higher than the blood sugar level your doctor recommends (for example, above 240 milligrams per deciliter [mg/dL]), drink extra liquids that don't contain sugar. Examples are water and sugar-free cola. ? If you can't eat your usual foods, then drink extra liquids, such as soup, sports drinks, or milk. You may also eat food that is gentle on the stomach. These foods include crackers, gelatin dessert, and applesauce. Try to eat or drink 50 grams of carbohydrates every 3 to 4 hours. For example, 6 saltine crackers, 1 cup (8 ounces) of milk, and ½ cup (4 ounces) of orange juice each contain about 15 grams of carbohydrate. · Check your blood sugar at least every 3 to 4 hours. If it goes up fast, check it more often. And check it even through the night. Take insulin if your doctor told you to do so. If you and your doctor didn't have a sick-day plan for taking extra insulin, call your doctor for advice.   · If you take insulin, check your urine or blood for ketones. This is even more important if your blood sugar is high. · Do not take any over-the-counter medicines, such as pain relievers, decongestants, or herbal products or other natural medicines, without talking with your doctor first.  · Do not drive. If you need to see your doctor or go anywhere else, ask a family member or friend to drive you. When should you call for help? Call 911 anytime you think you may need emergency care. For example, call if:    · You passed out (lost consciousness).     · You are confused or cannot think clearly.     · Your blood sugar is very high or very low. Watch closely for changes in your health, and be sure to contact your doctor if:    · Your blood sugar stays outside the level your doctor set for you.     · You have any problems. Where can you learn more? Go to https://Lloydgoff.compepicGotaCopyeb.GameLogic. org and sign in to your TutorDudes account. Enter I566 in the [employer] box to learn more about \"Diabetes Sick-Day Plan: Care Instructions. \"     If you do not have an account, please click on the \"Sign Up Now\" link. Current as of: August 31, 2020               Content Version: 12.8  © 2006-2021 [employer]. Care instructions adapted under license by [facility] ([facility]). If you have questions about a medical condition or this instruction, always ask your healthcare professional. [place] [address] any warranty or liability for your use of this information. Patient Education        Learning About Diabetes and Your Teeth  How does diabetes affect your teeth and gums? When you have diabetes, managing blood sugar levels and taking good care of your teeth and gums are both important. When blood sugar levels are high, there's a greater risk for:  · Gum (periodontal) disease. · Tooth decay. · Fungal infections in the mouth, like thrush. · Dry mouth, or xerostomia (say \"zee-ruh-STO-aneudy-uh\").  The mouth needs saliva to neutralize the acids in your mouth. These acids can lead to gum disease and tooth decay. Keeping your blood sugar levels in your target range can help prevent problems with the teeth and gums. If you have any problems with your teeth or gums, see your dentist.  How do you care for your teeth and gums when you have diabetes? · Brush your teeth twice a day. · Floss daily. Make sure to press the floss against your teeth and not your gums. · Check each day for areas where your gums might be red or painful. Be sure to let your dentist know of any sores in your mouth. · See your dentist regularly for professional cleaning of your teeth and to look for gum problems. Many dentists recommend getting checkups twice a year. Remind your dentist that you have diabetes before any work is done. · Don't smoke or use smokeless tobacco. Tobacco use with diabetes can lead to a greater risk of severe gum disease. If you need help quitting, talk to your doctor about stop-smoking programs and medicines. These can increase your chances of quitting for good. Follow-up care is a key part of your treatment and safety. Be sure to make and go to all appointments, and call your doctor if you are having problems. It's also a good idea to know your test results and keep a list of the medicines you take. Where can you learn more? Go to [email or web address]://[email or web address].[email or web address]. org and sign in to your [employer] account. Enter L674 in the [facility] box to learn more about \"Learning About Diabetes and Your Teeth. \"     If you do not have an account, please click on the \"Sign Up Now\" link. Current as of: August 31, 2020               Content Version: 12.8  © 9689-3361 Healthwise, Incorporated. Care instructions adapted under license by [facility] ([facility]).  If you have questions about a medical condition or this instruction, always ask your healthcare professional. Jesus Garcia any warranty or liability for your use of this information. Patient Education        Tdap (Tetanus, Diphtheria, Pertussis) Vaccine: What You Need to Know  Why get vaccinated? Tdap vaccine can prevent tetanus, diphtheria, and pertussis. Diphtheria and pertussis spread from person to person. Tetanus enters the body through cuts or wounds. · TETANUS (T) causes painful stiffening of the muscles. Tetanus can lead to serious health problems, including being unable to open the mouth, having trouble swallowing and breathing, or death. · DIPHTHERIA (D) can lead to difficulty breathing, heart failure, paralysis, or death. · PERTUSSIS (aP), also known as \"whooping cough,\" can cause uncontrollable, violent coughing which makes it hard to breathe, eat, or drink. Pertussis can be extremely serious in babies and young children, causing pneumonia, convulsions, brain damage, or death. In teens and adults, it can cause weight loss, loss of bladder control, passing out, and rib fractures from severe coughing. Tdap vaccine  Tdap is only for children 7 years and older, adolescents, and adults. Adolescents should receive a single dose of Tdap, preferably at age 6 or 15 years. Pregnant women should get a dose of Tdap during every pregnancy, to protect the  from pertussis. Infants are most at risk for severe, life threatening complications from pertussis. Adults who have never received Tdap should get a dose of Tdap. Also, adults should receive a booster dose every 10 years, or earlier in the case of a severe and dirty wound or burn. Booster doses can be either Tdap or Td (a different vaccine that protects against tetanus and diphtheria but not pertussis). Tdap may be given at the same time as other vaccines.   Talk with your health care provider  Tell your vaccine provider if the person getting the vaccine:  · Has had an allergic reaction after a previous dose of any vaccine that protects against tetanus, diphtheria, or pertussis, or has any severe, life threatening allergies. · Has had a coma, decreased level of consciousness, or prolonged seizures within 7 days after a previous dose of any pertussis vaccine (DTP, DTaP, or Tdap). · Has seizures or another nervous system problem. · Has ever had Guillain-Barré Syndrome (also called GBS). · Has had severe pain or swelling after a previous dose of any vaccine that protects against tetanus or diphtheria. In some cases, your health care provider may decide to postpone Tdap vaccination to a future visit. People with minor illnesses, such as a cold, may be vaccinated. People who are moderately or severely ill should usually wait until they recover before getting Tdap vaccine. Your health care provider can give you more information. Risks of a vaccine reaction  · Pain, redness, or swelling where the shot was given, mild fever, headache, feeling tired, and nausea, vomiting, diarrhea, or stomachache sometimes happen after Tdap vaccine. People sometimes faint after medical procedures, including vaccination. Tell your provider if you feel dizzy or have vision changes or ringing in the ears. As with any medicine, there is a very remote chance of a vaccine causing a severe allergic reaction, other serious injury, or death. What if there is a serious problem? An allergic reaction could occur after the vaccinated person leaves the clinic. If you see signs of a severe allergic reaction (hives, swelling of the face and throat, difficulty breathing, a fast heartbeat, dizziness, or weakness), call 9-1-1 and get the person to the nearest hospital.  For other signs that concern you, call your health care provider. Adverse reactions should be reported to the Vaccine Adverse Event Reporting System (VAERS). Your health care provider will usually file this report, or you can do it yourself. Visit the VAERS website at www.vaers. hhs.gov or call 6-300.265.5944.  VAERS is only for reporting reactions, and VAERS staff do not give medical advice. The National Vaccine Injury Compensation Program  The National Vaccine Injury Compensation Program (VICP) is a federal program that was created to compensate people who may have been injured by certain vaccines. Visit the VICP website at www.hrsa.gov/vaccinecompensation or call 3-169.239.9498 to learn about the program and about filing a claim. There is a time limit to file a claim for compensation. How can I learn more? · Ask your health care provider. · Call your local or state health department. · Contact the Centers for Disease Control and Prevention (CDC):  ? Call 2-589.555.2709 (1-800-CDC-INFO) or  ? Visit CDC's website at www.cdc.gov/vaccines  Vaccine Information Statement (Interim)  Tdap (Tetanus, Diphtheria, Pertussis) Vaccine  04/01/2020  42 UChrissy Santana [id number]-[id number]  Department of Health and Human Services  Centers for Disease Control and Prevention  Many Vaccine Information Statements are available in Swedish and other languages. See www.immunize.org/vis. Muchas hojas de información sobre vacunas están disponibles en español y en otros idiomas. Visite www.immunize.org/vis. Care instructions adapted under license by [facility] ([facility]). If you have questions about a medical condition or this instruction, always ask your healthcare professional. [place] [address] any warranty or liability for your use of this information. Patient Education        Live Zoster (Shingles) Vaccine: What You Need to Know  Why get vaccinated? Live zoster (shingles) vaccine can prevent shingles. Shingles (also called herpes zoster, or just zoster) is a painful skin rash, usually with blisters. In addition to the rash, shingles can cause fever, headache, chills, or upset stomach. More rarely, shingles can lead to pneumonia, hearing problems, blindness, brain inflammation (encephalitis), or death.   The most common complication of shingles is long-term nerve pain called postherpetic neuralgia (PHN). PHN occurs in the areas where the shingles rash was, even after the rash clears up. It can last for months or years after the rash goes away. The pain from PHN can be severe and debilitating. About 10 to 18% of people who get shingles will experience PHN. The risk of PHN increases with age. An older adult with shingles is more likely to develop PHN and have longer lasting and more severe pain than a younger person with shingles. Shingles is caused by the varicella zoster virus, the same virus that causes chickenpox. After you have chickenpox, the virus stays in your body and can cause shingles later in life. Shingles cannot be passed from one person to another, but the virus that causes shingles can spread and cause chickenpox in someone who had never had chickenpox or received chickenpox vaccine. Live shingles vaccine  Live shingles vaccine can provide protection against shingles and PHN. Another type of shingles vaccine, recombinant shingles vaccine, is the preferred vaccine for the prevention of shingles. However, live shingles vaccine may be used in some circumstances (for example if a person is allergic to recombinant shingles vaccine or prefers live shingles vaccine, or if recombinant shingles vaccine is not available). Adults 60 years and older who get live shingles vaccine should receive 1 dose, administered by injection. Shingles vaccine may be given at the same time as other vaccines. Talk with your health care provider  Tell your vaccine provider if the person getting the vaccine:  · Has had an allergic reaction after a previous dose of live shingles vaccine or varicella vaccine, or has any severe, life-threatening allergies. · Has a weakened immune system. · Is pregnant or thinks she might be pregnant. · Is currently experiencing an episode of shingles. In some cases, your health care provider may decide to postpone shingles vaccination to a future visit.   People with minor www.immunize.org/vis   Hojas de Información Sobre Vacunas están disponibles en Español y en muchos otros idiomas. Visite [email or web address]   Care instructions adapted under license by [facility] ([facility]). If you have questions about a medical condition or this instruction, always ask your healthcare professional. [address] any warranty or liability for your use of this information.

## 2021-06-09 NOTE — PROGRESS NOTES
6/9/2021    Chief Complaint   Patient presents with    New Patient     to establish    Diabetes     follow up       Anton Crews is a 45 y.o. female, presents today to establish care as a new patient to [facility] and myself. Diabetes  She presents for her initial diabetic visit. She has type 2 diabetes mellitus. The initial diagnosis of diabetes was made 11 years ago. Her disease course has been improving. Hypoglycemia symptoms include dizziness (occassionally with hypoglycemia- denies today). (Infrequently. Occurs when \"I get busy and forget to eat\") Pertinent negatives for diabetes include no blurred vision, no chest pain, no fatigue, no foot paresthesias, no foot ulcerations, no polydipsia, no polyphagia, no polyuria, no visual change, no weakness and no weight loss. Symptoms are stable. Pertinent negatives for diabetic complications include no autonomic neuropathy, CVA, heart disease, nephropathy, peripheral neuropathy, PVD or retinopathy. Risk factors for coronary artery disease include obesity, post-menopausal, diabetes mellitus and family history. Current diabetic treatment includes insulin injections. She is compliant with treatment all of the time. Her weight is decreasing steadily. She is following a diabetic diet. Meal planning includes avoidance of concentrated sweets (Avoids breads, carbs). She has had a previous visit with a dietitian. She participates in exercise daily (walks). Her breakfast blood glucose is taken between 7-8 am. Her breakfast blood glucose range is generally  mg/dl. Her dinner blood glucose is taken between 6-7 pm. Her dinner blood glucose range is generally 110-130 mg/dl. (Does not check blood sugar at lunch or bedtime) An ACE inhibitor/angiotensin II receptor blocker is contraindicated (previous endocrinologist took patient off ACE-I due to hypotension (typically runs 100/60-70)). She does not see a podiatrist (will refer today). Eye exam is not Visit   Medication Sig Dispense Refill    vitamin D 25 MCG (1000 UT) CAPS Take 1 capsule by mouth daily      aspirin 81 MG tablet Take 81 mg by mouth daily      VITAMIN D PO Take by mouth       No current facility-administered medications on file prior to visit. Allergies   Allergen Reactions    Zithromax [Azithromycin] Swelling     Facial swelling and itching.  Diflucan [Fluconazole] Other (See Comments)     Slight lip swelling with itching. Patient reports Ezekiel Finch is able to take for a bad yeast infection and takes [employer]"     Past Medical History:   Diagnosis Date    Diabetes [facility])      Past Surgical History:   Procedure Laterality Date    BREAST REDUCTION SURGERY       SECTION  80    CHOLECYSTECTOMY      OTHER SURGICAL HISTORY  2015    EXCISION OF HYDRADENITIS RIGHT AXILLA       Social History     Tobacco Use    Smoking status: Never Smoker    Smokeless tobacco: Never Used   Substance Use Topics    Alcohol use: No      Family History   Problem Relation Age of Onset    Diabetes Mother     High Blood Pressure Mother     Diabetes Father     Cancer Father         multiple myeloma    High Blood Pressure Father         Vitals:    21 0743   BP: 130/70   Site: Left Upper Arm   Position: Sitting   Cuff Size: Large Adult   Pulse: 90   SpO2: 98%   Weight: 236 lb (107 kg)   Height: 5' 8\" (1.727 m)     Estimated body mass index is 35.88 kg/m² as calculated from the following:    Height as of this encounter: 5' 8\" (1.727 m). Weight as of this encounter: 236 lb (107 kg). Physical Exam  Constitutional:       Appearance: Normal appearance. She is obese. Neck:      Vascular: No carotid bruit. Cardiovascular:      Rate and Rhythm: Normal rate and regular rhythm. Pulses: Normal pulses. Heart sounds: Normal heart sounds. Pulmonary:      Effort: Pulmonary effort is normal.      Breath sounds: Normal breath sounds.    Musculoskeletal:         General: Normal range of dinner)- Log provided- to bring to endocrinology appointment and next follow up. - Continue walking daily.  - Weight loss  - Does not need prescription for lancets, syringes, testing strips; gets at local pharmacy at a reduced cost.      2. Hidradenitis axillaris (History of)  - No infection today. - Take as needed clindamycin (CLEOCIN) 300 MG capsule; Take 1 capsule by mouth 3 times daily for 7 days Start at onset of boils  Dispense: 21 capsule; Refill: 2    3. Need for Tdap vaccination  - Tdap (age 6y and older) IM (Boostrix)- administered. 4. Lipid screening  - Lipid, Fasting; Future    5. Need for shingles vaccine  [facility]- administered. Return in about 3 months (around 9/9/2021) for Diabetes follow up. Discussed use, benefit, and side effects of prescribed medications. Patient's questions answered and concerns addressed. Patient agrees to plan of care. My scheduled days in the office reviewed with patient, and same day appointments available. Encouraged to use [employer] for communication as needed.      Electronically signed by MARU Ricardo CNP on 6/9/2021 at 9:15 AM

## 2021-09-09 ENCOUNTER — VIRTUAL VISIT (OUTPATIENT)
Dept: ENDOCRINOLOGY | Age: 51
End: 2021-09-09
Payer: COMMERCIAL

## 2021-09-09 ENCOUNTER — OFFICE VISIT (OUTPATIENT)
Dept: PRIMARY CARE CLINIC | Age: 51
End: 2021-09-09
Payer: COMMERCIAL

## 2021-09-09 VITALS
HEIGHT: 68 IN | WEIGHT: 238 LBS | OXYGEN SATURATION: 97 % | HEART RATE: 104 BPM | DIASTOLIC BLOOD PRESSURE: 70 MMHG | BODY MASS INDEX: 36.07 KG/M2 | SYSTOLIC BLOOD PRESSURE: 110 MMHG

## 2021-09-09 DIAGNOSIS — E11.9 TYPE 2 DIABETES MELLITUS WITHOUT COMPLICATION, WITH LONG-TERM CURRENT USE OF INSULIN (HCC): Primary | ICD-10-CM

## 2021-09-09 DIAGNOSIS — E66.01 CLASS 2 SEVERE OBESITY WITH SERIOUS COMORBIDITY AND BODY MASS INDEX (BMI) OF 36.0 TO 36.9 IN ADULT, UNSPECIFIED OBESITY TYPE (HCC): ICD-10-CM

## 2021-09-09 DIAGNOSIS — Z79.4 TYPE 2 DIABETES MELLITUS WITHOUT COMPLICATION, WITH LONG-TERM CURRENT USE OF INSULIN (HCC): Primary | ICD-10-CM

## 2021-09-09 DIAGNOSIS — Z23 NEED FOR INFLUENZA VACCINATION: ICD-10-CM

## 2021-09-09 DIAGNOSIS — Z79.4 LONG-TERM INSULIN USE (HCC): ICD-10-CM

## 2021-09-09 DIAGNOSIS — E78.2 MIXED HYPERLIPIDEMIA: ICD-10-CM

## 2021-09-09 LAB — HBA1C MFR BLD: 6.2 %

## 2021-09-09 PROCEDURE — 90471 IMMUNIZATION ADMIN: CPT | Performed by: NURSE PRACTITIONER

## 2021-09-09 PROCEDURE — 99214 OFFICE O/P EST MOD 30 MIN: CPT | Performed by: NURSE PRACTITIONER

## 2021-09-09 PROCEDURE — 99243 OFF/OP CNSLTJ NEW/EST LOW 30: CPT | Performed by: INTERNAL MEDICINE

## 2021-09-09 PROCEDURE — 90674 CCIIV4 VAC NO PRSV 0.5 ML IM: CPT | Performed by: NURSE PRACTITIONER

## 2021-09-09 PROCEDURE — 83036 HEMOGLOBIN GLYCOSYLATED A1C: CPT | Performed by: NURSE PRACTITIONER

## 2021-09-09 RX ORDER — ATORVASTATIN CALCIUM 10 MG/1
10 TABLET, FILM COATED ORAL DAILY
Qty: 90 TABLET | Refills: 1 | Status: SHIPPED | OUTPATIENT
Start: 2021-09-09 | End: 2021-12-09 | Stop reason: SDUPTHER

## 2021-09-09 RX ORDER — GLIPIZIDE 5 MG/1
5 TABLET ORAL DAILY
Qty: 90 TABLET | Refills: 0 | Status: CANCELLED | OUTPATIENT
Start: 2021-09-09 | End: 2021-12-08

## 2021-09-09 RX ORDER — INSULIN GLARGINE 100 [IU]/ML
40 INJECTION, SOLUTION SUBCUTANEOUS NIGHTLY
Qty: 10 PEN | Refills: 3 | Status: SHIPPED | OUTPATIENT
Start: 2021-09-09 | End: 2021-12-09 | Stop reason: DRUGHIGH

## 2021-09-09 RX ORDER — GLIPIZIDE 5 MG/1
5 TABLET ORAL DAILY
Qty: 90 TABLET | Refills: 1 | Status: CANCELLED | OUTPATIENT
Start: 2021-09-09 | End: 2021-12-08

## 2021-09-09 ASSESSMENT — ENCOUNTER SYMPTOMS
VOMITING: 0
NAUSEA: 0
CHEST TIGHTNESS: 0
SHORTNESS OF BREATH: 0
COUGH: 0
APNEA: 0
DIARRHEA: 0

## 2021-09-09 NOTE — PROGRESS NOTES
9/9/2021    Chief Complaint   Patient presents with    3 Month Follow-Up     diabetes       Lashell Hung is a 45 y.o. female, presents today for 3 month follow up of diabetes    HPI  Diabetes  Patient sees endocrinologist today- will not make any medication adjustments today and will defer to endocrinologist.     Treatment Adherence:   Medication compliance:  compliant all of the time  Diet compliance:  Varies- Has been traveling a lot. Weight trend: Increased 2 lb in 3 months. Current exercise: walks 2-3 times a day (1-1.5 miles each walk)  Barriers: none    Diabetes Mellitus Type 2  Current symptoms/problems include none. Home blood sugar records: fasting AM , midmorning- low, before bed varies depending on food eaten. Overall blood sugars good at home with exception of a couple hypoglycemic readings. Any episodes of hypoglycemia? yes - 28 lowest (2 days ago), typically 50 if running low happening frequently around 11:30ish. Eats a snack or juice and increases. Eye exam current (within one year): no- Established with optholgoist, will schedule an appt. Tobacco history: She  reports that she has never smoked. She has never used smokeless tobacco.   Daily Aspirin? Yes  Known diabetic complications: none  She does not take ACE inhibitor due to low blood pressure- was discontinued by previous endocrinologist.       Dyslipidemia  No new myalgias or GI upset on atorvastatin (Lipitor). She is adherent to diet low in saturated fats most of the time- does not eat a lot of fried foods.     Lab Results       Component                Value               Date                       LABA1C                      6.3                 06/09/2021            Lab Results       Component                Value               Date                       CREATININE               0.6                06/09/2021            Lab Results       Component                Value               Date                       ALT 16                  06/09/2021                 AST                             24                  06/09/2021            Lab Results       Component                Value               Date                       HDL                           60                  06/09/2021                 LDLCALC                  74                  06/09/2021              Review of Systems   Constitutional: Negative for activity change, appetite change, diaphoresis, fatigue and unexpected weight change. Respiratory: Negative for apnea, cough, chest tightness and shortness of breath. Cardiovascular: Negative for chest pain, palpitations and leg swelling. Gastrointestinal: Negative for diarrhea, nausea and vomiting. Endocrine: Negative for polydipsia, polyphagia and polyuria. Musculoskeletal: Negative for myalgias. Neurological: Negative for dizziness, tremors, weakness, light-headedness and headaches. Psychiatric/Behavioral: Negative. Current Outpatient Medications on File Prior to Visit   Medication Sig Dispense Refill    Lipitor 10 mg  Take 1 tablet by mouth daily.  vitamin D 25 MCG (1000 UT) CAPS Take 1 capsule by mouth daily      insulin regular (NOVOLIN R) 100 UNIT/ML injection Inject 15 Units into the skin 2 times daily (with meals) 1 vial 3    aspirin 81 MG tablet Take 81 mg by mouth daily      VITAMIN D PO Take by mouth      glipiZIDE (GLUCOTROL) 5 MG tablet Take 1 tablet by mouth daily 90 tablet 0    metFORMIN (GLUCOPHAGE) 500 MG tablet Take 2 tablets by mouth 2 times daily (with meals) 360 tablet 0    Liraglutide (VICTOZA) 18 MG/3ML SOPN SC injection Inject 1.2 mg into the skin daily 3 pen 0     No current facility-administered medications on file prior to visit. Allergies   Allergen Reactions    Zithromax [Azithromycin] Swelling     Facial swelling and itching.  Diflucan [Fluconazole] Other (See Comments)     Slight lip swelling with itching.  Patient reports Sergio Morillo is able to take for a bad yeast infection and takes [employer]"     Past Medical History:   Diagnosis Date    Diabetes [facility])      Past Surgical History:   Procedure Laterality Date    BREAST REDUCTION SURGERY       SECTION  80    CHOLECYSTECTOMY      OTHER SURGICAL HISTORY  2015    EXCISION OF HYDRADENITIS RIGHT AXILLA       Social History     Tobacco Use    Smoking status: Never Smoker    Smokeless tobacco: Never Used   Substance Use Topics    Alcohol use: No      Family History   Problem Relation Age of Onset    Diabetes Mother     High Blood Pressure Mother     Diabetes Father     Cancer Father         multiple myeloma    High Blood Pressure Father         Vitals:    21 0737   BP: 110/70   Site: Left Upper Arm   Position: Sitting   Cuff Size: Large Adult   Pulse: 104   SpO2: 97%   Weight: 238 lb (108 kg)   Height: 5' 8\" (1.727 m)     Estimated body mass index is 36.19 kg/m² as calculated from the following:    Height as of this encounter: 5' 8\" (1.727 m). Weight as of this encounter: 238 lb (108 kg). Physical Exam  Vitals and nursing note reviewed. Constitutional:       Appearance: Normal appearance. She is obese. Neck:      Vascular: No carotid bruit. Cardiovascular:      Rate and Rhythm: Normal rate and regular rhythm. Pulses: Normal pulses. Heart sounds: Normal heart sounds, S1 normal and S2 normal. No murmur heard. Pulmonary:      Effort: Pulmonary effort is normal.      Breath sounds: Normal breath sounds. Musculoskeletal:         General: Normal range of motion. Cervical back: Normal range of motion and neck supple. Right lower leg: No edema. Left lower leg: No edema. Lymphadenopathy:      Cervical: No cervical adenopathy. Skin:     General: Skin is warm and dry. Neurological:      General: No focal deficit present. Mental Status: She is alert and oriented to person, place, and time.    Psychiatric:         Mood and Affect: Mood normal.         Behavior: Behavior normal.         Results for POC orders placed in visit on 09/09/21   POCT glycosylated hemoglobin (Hb A1C)   Result Value Ref Range    Hemoglobin A1C 6.2 %     ASSESSMENT/PLAN:  1. Type 2 diabetes mellitus without complication, with long-term current use of insulin (HCC)  - Well controlled. -Appointment with endocrinologist today-we will defer treatment plan to endocrinology.  -Continue glipizide 5 mg daily  -Continue Novolin R 15 units 2 times daily with meals  -Continue Victoza 1.2 mg daily  -Continue Metformin at 1000 mg 2 times daily with meals  - Continue  atorvastatin (LIPITOR) 10 MG tablet; Take 1 tablet by mouth daily  Dispense: 90 tablet; Refill: 1  -Continue aspirin 81 mg.  - POCT glycosylated hemoglobin (Hb A1C)  -Continue checking blood sugar, increase from twice daily to 4 times daily if insulin is continued.  -Follow stricter low-carb/diabetic diet. 2.  Class II severe obesity with serious comorbidity and body mass index of 36.0-36.9 in adult  - Uncontrolled. - 2 lb gain in 3 months. -BMI 36.19, weight 238 lb  -Continue to make make efforts to lose weight  -Continue walking daily  -Declines referral to [facility] weight management solutions today-we will reassess in 3 months. 3.  Need for influenza vaccine  - INFLUENZA, MDCK QUADV, 2 YRS AND OLDER, IM, PF, PREFILL SYR OR SDV, 0.5ML (FLUCELVAX QUADV, PF)-- administered. Return in about 3 months (around 12/9/2021) for 3 month follow up of diabetes, obesity. Discussed use, benefit, and side effects of prescribed medications. Patient's questions answered and concerns addressed. Patient agrees to plan of care. My scheduled days in the office reviewed with patient, and same day appointments available. Encouraged to use [employer] for communication as needed.      Electronically signed by MARU James CNP on 9/9/2021 at 8:26 AM       This dictation was generated by voice recognition computer software. Although all attempts are made to edit the dictation for accuracy, there may be errors in the transcription that are not intended.

## 2021-09-09 NOTE — PATIENT INSTRUCTIONS
Patient Education        Learning About Meal Planning for Diabetes  Why plan your meals? Meal planning can be a key part of managing diabetes. Planning meals and snacks with the right balance of carbohydrate, protein, and fat can help you keep your blood sugar at the target level you set with your doctor. You don't have to eat special foods. You can eat what your family eats, including sweets once in a while. But you do have to pay attention to how often you eat and how much you eat of certain foods. You may want to work with a dietitian or a certified diabetes educator. He or she can give you tips and meal ideas and can answer your questions about meal planning. This health professional can also help you reach a healthy weight if that is one of your goals. What plan is right for you? Your dietitian or diabetes educator may suggest that you start with the plate format or carbohydrate counting. The plate format  The plate format is a simple way to help you manage how you eat. You plan meals by learning how much space each food should take on a plate. Using the plate format helps you spread carbohydrate throughout the day. It can make it easier to keep your blood sugar level within your target range. It also helps you see if you're eating healthy portion sizes. To use the plate format, you put non-starchy vegetables on half your plate. Add meat or meat substitutes on one-quarter of the plate. Put a grain or starchy vegetable (such as brown rice or a potato) on the final quarter of the plate. You can add a small piece of fruit and some low-fat or fat-free milk or yogurt, depending on your carbohydrate goal for each meal.  Here are some tips for using the plate format:  · Make sure that you are not using an oversized plate. A 9-inch plate is best. Many restaurants use larger plates. · Get used to using the plate format at home. Then you can use it when you eat out.   · Write down your questions about using the plate format. Talk to your doctor, a dietitian, or a diabetes educator about your concerns. Carbohydrate counting  With carbohydrate counting, you plan meals based on the amount of carbohydrate in each food. Carbohydrate raises blood sugar higher and more quickly than any other nutrient. It is found in desserts, breads and cereals, and fruit. It's also found in starchy vegetables such as potatoes and corn, grains such as rice and pasta, and milk and yogurt. Spreading carbohydrate throughout the day helps keep your blood sugar levels within your target range. Your daily amount depends on several things, including your weight, how active you are, which diabetes medicines you take, and what your goals are for your blood sugar levels. A registered dietitian or diabetes educator can help you plan how much carbohydrate to include in each meal and snack. A guideline for your daily amount of carbohydrate is:  · 45 to 60 grams at each meal. That's about the same as 3 to 4 carbohydrate servings. · 15 to 20 grams at each snack. That's about the same as 1 carbohydrate serving. The Nutrition Facts label on packaged foods tells you how much carbohydrate is in a serving of the food. First, look at the serving size on the food label. Is that the amount you eat in a serving? All of the nutrition information on a food label is based on that serving size. So if you eat more or less than that, you'll need to adjust the other numbers. Total carbohydrate is the next thing you need to look for on the label. If you count carbohydrate servings, one serving of carbohydrate is 15 grams. For foods that don't come with labels, such as fresh fruits and vegetables, you'll need a guide that lists carbohydrate in these foods. Ask your doctor, dietitian, or diabetes educator about books or other nutrition guides you can use.   If you take insulin, you need to know how many grams of carbohydrate are in a meal. This lets you know how much rapid-acting insulin to take before you eat. If you use an insulin pump, you get a constant rate of insulin during the day. So the pump must be programmed at meals to give you extra insulin to cover the rise in blood sugar after meals. When you know how much carbohydrate you will eat, you can take the right amount of insulin. Or, if you always use the same amount of insulin, you need to make sure that you eat the same amount of carbohydrate at meals. If you need more help to understand carbohydrate counting and food labels, ask your doctor, dietitian, or diabetes educator. How can you plan healthy meals? Here are some tips to get started:  · Plan your meals a week at a time. Don't forget to include snacks too. · Use cookbooks or online recipes to plan several main meals. Plan some quick meals for busy nights. You also can double some recipes that freeze well. Then you can save half for other busy nights when you don't have time to cook. · Make sure you have the ingredients you need for your recipes. If you're running low on basic items, put these items on your shopping list too. · List foods that you use to make breakfasts, lunches, and snacks. List plenty of fruits and vegetables. · Post this list on the refrigerator. Add to it as you think of more things you need. · Take the list to the store to do your weekly shopping. Follow-up care is a key part of your treatment and safety. Be sure to make and go to all appointments, and call your doctor if you are having problems. It's also a good idea to know your test results and keep a list of the medicines you take. Where can you learn more? Go to https://sung.[employer]. org and sign in to your [employer] account. Enter M295 in the [employer] box to learn more about \"Learning About Meal Planning for Diabetes. \"     If you do not have an account, please click on the \"Sign Up Now\" link.   Current as of: August 31, 2020               Content Version: 12.9  © 4306-0050 Healthwise, [employer]. Care instructions adapted under license by [facility] [place] ([facility]). If you have questions about a medical condition or this instruction, always ask your healthcare professional. [address] any warranty or liability for your use of this information. Patient Education        Learning About Carbohydrate (Carb) Counting and Eating Out When You Have Diabetes  Why plan your meals? Meal planning can be a key part of managing diabetes. Planning meals and snacks with the right balance of carbohydrate, protein, and fat can help you keep your blood sugar at the target level you set with your doctor. You don't have to eat special foods. You can eat what your family eats, including sweets once in a while. But you do have to pay attention to how often you eat and how much you eat of certain foods. You may want to work with a dietitian or a certified diabetes educator. He or she can give you tips and meal ideas and can answer your questions about meal planning. This health professional can also help you reach a healthy weight if that is one of your goals. What should you know about eating carbs? Managing the amount of carbohydrate (carbs) you eat is an important part of healthy meals when you have diabetes. Carbohydrate is found in many foods. · Learn which foods have carbs. And learn the amounts of carbs in different foods. ? Bread, cereal, pasta, and rice have about 15 grams of carbs in a serving. A serving is 1 slice of bread (1 ounce), ½ cup of cooked cereal, or 1/3 cup of cooked pasta or rice. ? Fruits have 15 grams of carbs in a serving. A serving is 1 small fresh fruit, such as an apple or orange; ½ of a banana; ½ cup of cooked or canned fruit; ½ cup of fruit juice; 1 cup of melon or raspberries; or 2 tablespoons of dried fruit. ? Milk and no-sugar-added yogurt have 15 grams of carbs in a serving.  A serving is 1 cup of milk or 2/3 cup of no-sugar-added yogurt. ? Starchy vegetables have 15 grams of carbs in a serving. A serving is ½ cup of mashed potatoes or sweet potato; 1 cup winter squash; ½ of a small baked potato; ½ cup of cooked beans; or ½ cup cooked corn or green peas. · Learn how much carbs to eat each day and at each meal. A dietitian or CDE can teach you how to keep track of the amount of carbs you eat. This is called carbohydrate counting. · If you are not sure how to count carbohydrate grams, use the Plate Method to plan meals. It is a good, quick way to make sure that you have a balanced meal. It also helps you spread carbs throughout the day. ? Divide your plate by types of foods. Put non-starchy vegetables on half the plate, meat or other protein food on one-quarter of the plate, and a grain or starchy vegetable in the final quarter of the plate. To this you can add a small piece of fruit and 1 cup of milk or yogurt, depending on how many carbs you are supposed to eat at a meal.  · Try to eat about the same amount of carbs at each meal. Do not \"save up\" your daily allowance of carbs to eat at one meal.  · Proteins have very little or no carbs per serving. Examples of proteins are beef, chicken, turkey, fish, eggs, tofu, cheese, cottage cheese, and peanut butter. A serving size of meat is 3 ounces, which is about the size of a deck of cards. Examples of meat substitute serving sizes (equal to 1 ounce of meat) are 1/4 cup of cottage cheese, 1 egg, 1 tablespoon of peanut butter, and ½ cup of tofu. How can you eat out and still eat healthy? · Learn to estimate the serving sizes of foods that have carbohydrate. If you measure food at home, it will be easier to estimate the amount in a serving of restaurant food. · If the meal you order has too much carbohydrate (such as potatoes, corn, or baked beans), ask to have a low-carbohydrate food instead. Ask for a salad or green vegetables.   · If you use insulin, check your blood sugar important part of living with diabetes is keeping your blood sugar in your target range. You'll need to know what to do if it's too high or too low. Managing your blood sugar levels helps you avoid emergencies. This care sheet will teach you about the signs of high and low blood sugar. It will help you make an action plan with your doctor for when these signs occur. Low blood sugar is more likely to happen if you take certain medicines for diabetes. It can also happen if you skip a meal, drink alcohol, or exercise more than usual.  You may get high blood sugar if you eat differently than you normally do. One example is eating more carbohydrate than usual. Having a cold, the flu, or other sudden illness can also cause high blood sugar levels. Levels can also rise if you miss a dose of medicine. Any change in how you take your medicine may affect your blood sugar level. So it's important to work with your doctor before you make any changes. Track your blood sugar  Work with your doctor to fill in the blank spaces below that apply to you. Track your levels, know your target range, and write down ways you can get your blood sugar back in your target range. A log book can help you track your levels. Take the book to all of your medical appointments. · Check your blood sugar _____ times a day, at these times:________________________________________________. (For example: Before meals, at bedtime, before exercise, during exercise, other.)  · Your blood sugar target range before a meal is ___________________. Your blood sugar target range after a meal is _______________________. · Do this___________________________________________________to get your blood sugar back within your safe range if your blood sugar results are _________________________________________. (For example: Less than 70 or above 250 mg/dL.)  Call your doctor when your blood sugar results are ___________________________________.  (For example: Less than 70 or above 250 mg/dL.)  What are the symptoms of low and high blood sugar? Common symptoms of low blood sugar are sweating and feeling shaky, weak, hungry, or confused. Symptoms can start quickly. Common symptoms of high blood sugar are feeling very thirsty or very hungry. You may also pass urine more often than usual. You may have blurry vision and may lose weight without trying. But some people may have high or low blood sugar without having any symptoms. That's a good reason to check your blood sugar on a regular schedule. What should you do if you have symptoms? Work with your doctor to fill in the blank spaces below that apply to you. Low blood sugar and \"the rule of 15\"  If you have symptoms of low blood sugar, check your blood sugar. If it's below _____ ( for example, below 70), eat or drink a quick-sugar food that has about 15 grams of carbohydrate. Your goal is to get your level back to your safe range. Check your blood sugar again 15 minutes later. If it's still not in your target range, take another 15 grams of carbohydrate and check your blood sugar again in 15 minutes. Repeat this until you reach your target. Then go back to your regular testing schedule. Children usually need less than 15 grams of carbohydrate. Check with your doctor or diabetes educator for the amount that is right for your child. When you have low blood sugar, it's best to stop or reduce any physical activity until your blood sugar is back in your target range and is stable. If you must stay active, eat or drink 30 grams of carbohydrate. Then check your blood sugar again in 15 minutes. If it's not in your target range, take another 30 grams of carbohydrates. Check your blood sugar again in 15 minutes. Keep doing this until you reach your target. You can then go back to your regular testing schedule.   If your symptoms or blood sugar levels are getting worse or have not improved after 15 minutes, seek medical care right away. If you take insulin, always carry a glucagon emergency kit. Be sure your family, friends, and coworkers know how to give glucagon. Here are some examples of quick-sugar foods with 15 grams of carbohydrate:  · 3 or 4 glucose tablets  · 1 tablespoon (3 teaspoons) table sugar  · ½ cup to ¾ cup (4 to 6 ounces) of fruit juice or regular (not diet) soda  · Hard candy (such as 6 Life Savers)  High blood sugar  If you have symptoms of high blood sugar, check your blood sugar. Your goal is to get your level back to your target range. If it's above ______ ( for example, above 250), follow these steps:  · If you missed a dose of your diabetes medicine, take it now. Take only the amount of medicine that you have been prescribed. Do not take more or less medicine. · Give yourself insulin if your doctor has prescribed it for high blood sugar. · Test for ketones, if the doctor told you to do so. If the results of the ketone test show a moderate-to-large amount of ketones, call the doctor for advice. · Wait 30 minutes after you take the extra insulin or the missed medicine. Check your blood sugar again. If your symptoms or blood sugar levels are getting worse or have not improved after taking these steps, seek medical care right away. Follow-up care is a key part of your treatment and safety. Be sure to make and go to all appointments, and call your doctor if you are having problems. It's also a good idea to know your test results and keep a list of the medicines you take. Where can you learn more? Go to https://[employer]. org and sign in to your [employer] account. Enter F797 in the [facility] box to learn more about \"Diabetes Blood Sugar Emergencies: Your Action Plan. \"     If you do not have an account, please click on the \"Sign Up Now\" link. Current as of: August 31, 2020               Content Version: 12.9  © 2826-9589 Healthwise, Inc[employer].    Care instructions adapted under license by [facility] ([facility]). If you have questions about a medical condition or this instruction, always ask your healthcare professional. [address] any warranty or liability for your use of this information.

## 2021-09-09 NOTE — PROGRESS NOTES
Sindy Anglin is a 45 y.o. female is referred to me by Dr Zita Gitelman  for evaluation and management of uncontrolled Type 2 diabetes. Belen Barbosa was diagnosed with Diabetes mellitus at age 38 . Patient had gestational diabetes with her 2 pregnancies  Never had DKA  At the time of diagnosis patient had dizzy spells and thirsty . Belen Barbosa got diabetic education in the past.  Comorbid conditions: none  She was initially diet controlled and then on Victoza for years and was later switched to insulin and glipzide, on review of the chart it appears that she was on Jardiance and Januvia at one point  She has hyperlipidemia and is on statins but she feels it is mostly preventive  Previously had hypotension with lisinopril and her medications were stopped    INTERIM:    Diabetes  She presents for her initial diabetic visit. She has type 2 diabetes mellitus. The initial diagnosis of diabetes was made 10 years ago. Her disease course has been stable. Hypoglycemia symptoms include dizziness and hunger. There are no diabetic associated symptoms. Symptoms are improving. Current diabetic treatment includes insulin injections and oral agent (dual therapy). She is compliant with treatment most of the time. She is following a generally healthy diet. Meal planning includes avoidance of concentrated sweets. She has had a previous visit with a dietitian. Her breakfast blood glucose is taken between 7-8 am. Her breakfast blood glucose range is generally  mg/dl. Her lunch blood glucose range is generally <70 mg/dl.        Insulin N 15 units in morning and 15 units with dinner along with metformin   Having hypoglycemia lowest 28 before lunchtime, she is getting hypoglycemia before lunch 2 to 3 days a week      Past Medical History:   Diagnosis Date    Diabetes ([facility] [address]     Hyperlipidemia       Patient Active Problem List   Diagnosis    Hidradenitis axillaris    Sprain of anterior talofibular ligament of left ankle    Contusion of left knee    Type 2 diabetes mellitus without complication, with long-term current use of insulin (HCC)     Past Surgical History:   Procedure Laterality Date    BREAST REDUCTION SURGERY       SECTION  80    CHOLECYSTECTOMY      OTHER SURGICAL HISTORY  2015    EXCISION OF HYDRADENITIS RIGHT AXILLA      Social History     Socioeconomic History    Marital status:      Spouse name: Not on file    Number of children: Not on file    Years of education: Not on file    Highest education level: Not on file   Occupational History    Not on file   Tobacco Use    Smoking status: Never Smoker    Smokeless tobacco: Never Used   Vaping Use    Vaping Use: Never assessed   Substance and Sexual Activity    Alcohol use: No    Drug use: No    Sexual activity: Not on file   Other Topics Concern    Not on file   Social History Narrative    Not on file     Social Determinants of Health     Financial Resource Strain:     Difficulty of Paying Living Expenses:    Food Insecurity:     Worried About Running Out of Food in the Last Year:     Hipolito of Food in the Last Year:    Transportation Needs:     Lack of Transportation (Medical):      Lack of Transportation (Non-Medical):    Physical Activity:     Days of Exercise per Week:     Minutes of Exercise per Session:    Stress:     Feeling of Stress :    Social Connections:     Frequency of Communication with Friends and Family:     Frequency of Social Gatherings with Friends and Family:     Attends [employer] Services:     Active Member of Clubs or Organizations:     Attends Club or Organization Meetings:     Marital Status:    Intimate Partner Violence:     Fear of Current or Ex-Partner:     Emotionally Abused:     Physically Abused:     Sexually Abused:      Family History   Problem Relation Age of Onset    Diabetes Mother     High Blood Pressure Mother     Diabetes Father     Cancer Father         multiple myeloma    High Blood Pressure Father      Current Outpatient Medications   Medication Sig Dispense Refill    atorvastatin (LIPITOR) 10 MG tablet Take 1 tablet by mouth daily 90 tablet 1    Liraglutide (VICTOZA) 18 MG/3ML SOPN SC injection Inject 1.2 mg into the skin daily 3 pen 2    Empagliflozin-metFORMIN HCl 5-1000 MG TABS 1 tab bid 180 tablet 3    insulin glargine (LANTUS SOLOSTAR) 100 UNIT/ML injection pen Inject 40 Units into the skin nightly 10 pen 3    vitamin D 25 MCG (1000 UT) CAPS Take 1 capsule by mouth daily      aspirin 81 MG tablet Take 81 mg by mouth daily       No current facility-administered medications for this visit. Allergies   Allergen Reactions    Zithromax [Azithromycin] Swelling     Facial swelling and itching.  Diflucan [Fluconazole] Other (See Comments)     Slight lip swelling with itching. Patient reports Cris Lentz is able to take for a bad yeast infection and takes Benadryl\"     Family Status   Relation Name Status    Mother  (Not Specified)    Father  (Not Specified)       Review of Systems  I have reviewed the review of system questionnaire filled by the patient .   Patient was advised to contact PCP for non endocrine signs and symptoms       OBJECTIVE:  LMP 11/24/2015    Wt Readings from Last 3 Encounters:   09/09/21 238 lb (108 kg)   06/09/21 236 lb (107 kg)   05/11/21 250 lb (113.4 kg)       Constitutional: no acute distress, well appearing and well nourished  Psychiatric: oriented to person, place and time, judgement and insight and normal, recent and remote memory intact and mood and affect are normal  Skin: skin and subcutaneous tissue is normal without visible mass,   Head and Face: visual inspection  of head and face revealed no abnormalities  Eyes: visual inspection showed no lid or conjunctival swelling, erythema or discharge, pupils are normal, equal, round  Ears/Nose: external inspection of ears and nose revealed no abnormalities, hearing is grossly normal  Oropharynx/Mouth/Face: lips, tongue and gums appear  normal with no lesions, the voice quality was normal  Neck: neck appears symmetric, with no visible masses,   Pulmonary: no increased work of breathing or signs of respiratory distress,  Musculoskeletal: normal on inspection    Neurological: normal coordination and normal general cortical function      Lab Results   Component Value Date    LABA1C 6.2 09/09/2021    LABA1C 6.3 06/09/2021         ASSESSMENT/PLAN:      1. Type 2 diabetes mellitus without complication, with long-term current use of insulin A1c 6.2 in September 2021     I had a lengthy discussion with the patient about  her  uncontrolled diabetes. We discussed progression of diabetes in detail and also the incidence of complications associated with uncontrolled diabetes. Belen Barbosa was advised that lifestyle modification is the key to better control of her Diabetes. We discussed carbohydrate restriction in detail. Patient switch from Novolin and 15 units twice a day to Lantus 25 units daily  Currently she is having significant hypoglycemia in the range of 28-50 before lunch specifically if she is a little late at lunch. She will stop using glipizide we will switch to [facility] she will stop Metformin once [facility] is available to her  We also discussed switching Victoza 1.8 mg daily to once weekly preparation but she is not sure if her insurance would be covering it    Hypoglycemia protocol reviewed in detail with patient Patient was advised to carry glucose tablets and also have glucagon emergency kit. Patient checks her fingerstick blood glucose 4-6 times a day. Patient was advised that sending of her fingerstick blood glucose logs is crucial in management of her  diabetes. I will adjust the  doses of diabetic medications  according to sent data.      Health Maintenance       Last Eye Exam: advised to have annual dilated eye exam. her last eye exam was in 2019  Last Podiatry Exam: Last foot exam was with primary care physician  Lipids: Last LDL level was 74 in June 2021  Microalbumin/Creatinine Ratio: I have ordered MA with next lab work     . Education: Reviewed ABCs of diabetes management (respective goals in parentheses): A1C (<7), blood pressure (<130/80), and cholesterol (LDL <100). Additional Education: referred to Diabetes Educator.      - Liraglutide (VICTOZA) 18 MG/3ML SOPN SC injection; Inject 1.2 mg into the skin daily  Dispense: 3 pen; Refill: 2    2. Mixed hyperlipidemia  Patient is on statin  LDL 74 in June 2021  Continue with the current medication    3. Long-term insulin use (HCC)  Continues to be on basal insulin      Reviewed and/or ordered clinical lab results yes   Reviewed and/or ordered radiology tests Yes  Reviewed and/or ordered other diagnostic tests yes   Made a decision to obtain old records yes   Reviewed and summarized old records yes     Belenpierce Barbosa was counseled regarding symptoms of current diagnosis, course and complications of disease if inadequately treated, side effects of medications, diagnosis, treatment options, and prognosis, risks, benefits, complications, and alternatives of treatment, labs, imaging and other studies and treatment targets and goals. She understands instructions and counseling      Return in about 3 months (around 12/9/2021). Please note that some or all of this report was generated using voice recognition software. Please notify me in case of any questions about the content of this document, as some errors in transcription may have occurred .

## 2021-10-05 ENCOUNTER — TELEPHONE (OUTPATIENT)
Dept: PRIMARY CARE CLINIC | Age: 51
End: 2021-10-05

## 2021-10-05 NOTE — TELEPHONE ENCOUNTER
Patient had dental surgery and then felt like she had a cold on Tuesday 9/28/21. Runny nose, annoying cough patient took Muxinex and has felt fine ever since. Patient went to [employer] on Sunday and a friend of the patient suggested she have a covid test done. Her test came back positive on 10/5/21 and she had it done on Sunday. Patient was wondering how long she should quarantine. Patient works from home and said she would let us know if she needed a note for work, but she didn't think she would. Patients family is getting tested.

## 2021-10-06 NOTE — TELEPHONE ENCOUNTER
Please notify patient of the following.   Stay home until  · At least 10 days since symptoms first appeared   and  · At least 24 hours with no fever without  the use of fever-reducing medications   and  · Symptoms have improve

## 2021-10-07 ENCOUNTER — PATIENT MESSAGE (OUTPATIENT)
Dept: ENDOCRINOLOGY | Age: 51
End: 2021-10-07

## 2021-10-07 DIAGNOSIS — E11.9 TYPE 2 DIABETES MELLITUS WITHOUT COMPLICATION, WITH LONG-TERM CURRENT USE OF INSULIN (HCC): Primary | ICD-10-CM

## 2021-10-07 DIAGNOSIS — Z79.4 TYPE 2 DIABETES MELLITUS WITHOUT COMPLICATION, WITH LONG-TERM CURRENT USE OF INSULIN (HCC): Primary | ICD-10-CM

## 2021-10-07 RX ORDER — PEN NEEDLE, DIABETIC 30 GX5/16"
NEEDLE, DISPOSABLE MISCELLANEOUS
Qty: 100 EACH | Refills: 3 | Status: SHIPPED | OUTPATIENT
Start: 2021-10-07 | End: 2021-12-09 | Stop reason: SDUPTHER

## 2021-10-07 NOTE — TELEPHONE ENCOUNTER
From: Paul Sampson  To: Jay Elder MD  Sent: 10/7/2021 8:22 AM EDT  Subject: Prescription Question    Hello. I need a refill on Pen needles   31 gauge x 3/16x5mm  [place] [facility] [place] Ill be using their mail order. Im attaching my card. Thanks!

## 2021-12-09 ENCOUNTER — OFFICE VISIT (OUTPATIENT)
Dept: PRIMARY CARE CLINIC | Age: 51
End: 2021-12-09
Payer: COMMERCIAL

## 2021-12-09 VITALS
OXYGEN SATURATION: 98 % | SYSTOLIC BLOOD PRESSURE: 106 MMHG | DIASTOLIC BLOOD PRESSURE: 68 MMHG | WEIGHT: 219.2 LBS | HEART RATE: 81 BPM | BODY MASS INDEX: 33.22 KG/M2 | HEIGHT: 68 IN | TEMPERATURE: 96.4 F

## 2021-12-09 DIAGNOSIS — E66.09 CLASS 1 OBESITY DUE TO EXCESS CALORIES WITH SERIOUS COMORBIDITY AND BODY MASS INDEX (BMI) OF 33.0 TO 33.9 IN ADULT: ICD-10-CM

## 2021-12-09 DIAGNOSIS — E11.9 TYPE 2 DIABETES MELLITUS WITHOUT COMPLICATION, WITH LONG-TERM CURRENT USE OF INSULIN (HCC): Primary | ICD-10-CM

## 2021-12-09 DIAGNOSIS — Z79.4 LONG-TERM INSULIN USE (HCC): ICD-10-CM

## 2021-12-09 DIAGNOSIS — Z79.4 TYPE 2 DIABETES MELLITUS WITHOUT COMPLICATION, WITH LONG-TERM CURRENT USE OF INSULIN (HCC): ICD-10-CM

## 2021-12-09 DIAGNOSIS — Z13.220 LIPID SCREENING: ICD-10-CM

## 2021-12-09 DIAGNOSIS — E78.2 MIXED HYPERLIPIDEMIA: ICD-10-CM

## 2021-12-09 DIAGNOSIS — E11.9 TYPE 2 DIABETES MELLITUS WITHOUT COMPLICATION, WITH LONG-TERM CURRENT USE OF INSULIN (HCC): ICD-10-CM

## 2021-12-09 DIAGNOSIS — Z79.4 TYPE 2 DIABETES MELLITUS WITHOUT COMPLICATION, WITH LONG-TERM CURRENT USE OF INSULIN (HCC): Primary | ICD-10-CM

## 2021-12-09 PROBLEM — E66.811 CLASS 1 OBESITY DUE TO EXCESS CALORIES WITH SERIOUS COMORBIDITY AND BODY MASS INDEX (BMI) OF 33.0 TO 33.9 IN ADULT: Status: ACTIVE | Noted: 2021-12-09

## 2021-12-09 LAB
CHOLESTEROL, TOTAL: 129 MG/DL (ref 0–199)
CREATININE URINE: 100.2 MG/DL (ref 28–259)
HDLC SERPL-MCNC: 58 MG/DL (ref 40–60)
LDL CHOLESTEROL CALCULATED: 62 MG/DL
MICROALBUMIN UR-MCNC: <1.2 MG/DL
MICROALBUMIN/CREAT UR-RTO: NORMAL MG/G (ref 0–30)
TRIGL SERPL-MCNC: 46 MG/DL (ref 0–150)
TSH SERPL DL<=0.05 MIU/L-ACNC: 1.17 UIU/ML (ref 0.27–4.2)
VLDLC SERPL CALC-MCNC: 9 MG/DL

## 2021-12-09 PROCEDURE — 99214 OFFICE O/P EST MOD 30 MIN: CPT | Performed by: NURSE PRACTITIONER

## 2021-12-09 RX ORDER — INSULIN GLARGINE 100 [IU]/ML
16 INJECTION, SOLUTION SUBCUTANEOUS NIGHTLY
Qty: 10 PEN | Refills: 3 | Status: SHIPPED | OUTPATIENT
Start: 2021-12-09 | End: 2021-12-13 | Stop reason: SDUPTHER

## 2021-12-09 RX ORDER — PEN NEEDLE, DIABETIC 30 GX5/16"
NEEDLE, DISPOSABLE MISCELLANEOUS
Qty: 100 EACH | Refills: 3 | Status: SHIPPED | OUTPATIENT
Start: 2021-12-09 | End: 2022-03-21 | Stop reason: SDUPTHER

## 2021-12-09 RX ORDER — ATORVASTATIN CALCIUM 10 MG/1
10 TABLET, FILM COATED ORAL DAILY
Qty: 90 TABLET | Refills: 1 | Status: SHIPPED | OUTPATIENT
Start: 2021-12-09 | End: 2021-12-13 | Stop reason: SDUPTHER

## 2021-12-09 ASSESSMENT — ENCOUNTER SYMPTOMS
ABDOMINAL PAIN: 0
APNEA: 0
SHORTNESS OF BREATH: 0
COUGH: 0
WHEEZING: 0
VOMITING: 0
NAUSEA: 0
DIARRHEA: 0

## 2021-12-09 NOTE — PATIENT INSTRUCTIONS
the plate format. Talk to your doctor, a dietitian, or a diabetes educator about your concerns. Carbohydrate counting  With carbohydrate counting, you plan meals based on the amount of carbohydrate in each food. Carbohydrate raises blood sugar higher and more quickly than any other nutrient. It is found in desserts, breads and cereals, and fruit. It's also found in starchy vegetables such as potatoes and corn, grains such as rice and pasta, and milk and yogurt. Spreading carbohydrate throughout the day helps keep your blood sugar levels within your target range. Your daily amount depends on several things, including your weight, how active you are, which diabetes medicines you take, and what your goals are for your blood sugar levels. A registered dietitian or diabetes educator can help you plan how much carbohydrate to include in each meal and snack. A guideline for your daily amount of carbohydrate is:  · 45 to 60 grams at each meal. That's about the same as 3 to 4 carbohydrate servings. · 15 to 20 grams at each snack. That's about the same as 1 carbohydrate serving. The Nutrition Facts label on packaged foods tells you how much carbohydrate is in a serving of the food. First, look at the serving size on the food label. Is that the amount you eat in a serving? All of the nutrition information on a food label is based on that serving size. So if you eat more or less than that, you'll need to adjust the other numbers. Total carbohydrate is the next thing you need to look for on the label. If you count carbohydrate servings, one serving of carbohydrate is 15 grams. For foods that don't come with labels, such as fresh fruits and vegetables, you'll need a guide that lists carbohydrate in these foods. Ask your doctor, dietitian, or diabetes educator about books or other nutrition guides you can use.   If you take insulin, you need to know how many grams of carbohydrate are in a meal. This lets you know how much 2020               Content Version: 13.0  © 2006-2021 Healthwise, [employer]. Care instructions adapted under license by [facility] ([facility]). If you have questions about a medical condition or this instruction, always ask your healthcare professional. [address] any warranty or liability for your use of this information. Patient Education        Learning About Carbohydrate (Carb) Counting and Eating Out When You Have Diabetes  Why plan your meals? Meal planning can be a key part of managing diabetes. Planning meals and snacks with the right balance of carbohydrate, protein, and fat can help you keep your blood sugar at the target level you set with your doctor. You don't have to eat special foods. You can eat what your family eats, including sweets once in a while. But you do have to pay attention to how often you eat and how much you eat of certain foods. You may want to work with a dietitian or a certified diabetes educator. He or she can give you tips and meal ideas and can answer your questions about meal planning. This health professional can also help you reach a healthy weight if that is one of your goals. What should you know about eating carbs? Managing the amount of carbohydrate (carbs) you eat is an important part of healthy meals when you have diabetes. Carbohydrate is found in many foods. · Learn which foods have carbs. And learn the amounts of carbs in different foods. ? Bread, cereal, pasta, and rice have about 15 grams of carbs in a serving. A serving is 1 slice of bread (1 ounce), ½ cup of cooked cereal, or 1/3 cup of cooked pasta or rice. ? Fruits have 15 grams of carbs in a serving. A serving is 1 small fresh fruit, such as an apple or orange; ½ of a banana; ½ cup of cooked or canned fruit; ½ cup of fruit juice; 1 cup of melon or raspberries; or 2 tablespoons of dried fruit. ? Milk and no-sugar-added yogurt have 15 grams of carbs in a serving.  A serving is 1 cup of milk or 2/3 cup of no-sugar-added yogurt. ? Starchy vegetables have 15 grams of carbs in a serving. A serving is ½ cup of mashed potatoes or sweet potato; 1 cup winter squash; ½ of a small baked potato; ½ cup of cooked beans; or ½ cup cooked corn or green peas. · Learn how much carbs to eat each day and at each meal. A dietitian or CDE can teach you how to keep track of the amount of carbs you eat. This is called carbohydrate counting. · If you are not sure how to count carbohydrate grams, use the Plate Method to plan meals. It is a good, quick way to make sure that you have a balanced meal. It also helps you spread carbs throughout the day. ? Divide your plate by types of foods. Put non-starchy vegetables on half the plate, meat or other protein food on one-quarter of the plate, and a grain or starchy vegetable in the final quarter of the plate. To this you can add a small piece of fruit and 1 cup of milk or yogurt, depending on how many carbs you are supposed to eat at a meal.  · Try to eat about the same amount of carbs at each meal. Do not \"save up\" your daily allowance of carbs to eat at one meal.  · Proteins have very little or no carbs per serving. Examples of proteins are beef, chicken, turkey, fish, eggs, tofu, cheese, cottage cheese, and peanut butter. A serving size of meat is 3 ounces, which is about the size of a deck of cards. Examples of meat substitute serving sizes (equal to 1 ounce of meat) are 1/4 cup of cottage cheese, 1 egg, 1 tablespoon of peanut butter, and ½ cup of tofu. How can you eat out and still eat healthy? · Learn to estimate the serving sizes of foods that have carbohydrate. If you measure food at home, it will be easier to estimate the amount in a serving of restaurant food. · If the meal you order has too much carbohydrate (such as potatoes, corn, or baked beans), ask to have a low-carbohydrate food instead. Ask for a salad or green vegetables.   · If you use insulin, check your blood sugar before and after eating out to help you plan how much to eat in the future. · If you eat more carbohydrate at a meal than you had planned, take a walk or do other exercise. This will help lower your blood sugar. What are some tips for eating healthy? · Limit saturated fat, such as the fat from meat and dairy products. This is a healthy choice because people who have diabetes are at higher risk of heart disease. So choose lean cuts of meat and nonfat or low-fat dairy products. Use olive or canola oil instead of butter or shortening when cooking. · Don't skip meals. Your blood sugar may drop too low if you skip meals and take insulin or certain medicines for diabetes. · Check with your doctor before you drink alcohol. Alcohol can cause your blood sugar to drop too low. Alcohol can also cause a bad reaction if you take certain diabetes medicines. Follow-up care is a key part of your treatment and safety. Be sure to make and go to all appointments, and call your doctor if you are having problems. It's also a good idea to know your test results and keep a list of the medicines you take. Where can you learn more? Go to https://AIRVENDpepiceweb.G.ho.st. org and sign in to your [employer] account. Enter S364 in the [facility] box to learn more about \"Learning About Carbohydrate (Carb) Counting and Eating Out When You Have Diabetes. \"     If you do not have an account, please click on the \"Sign Up Now\" link. Current as of: December 17, 2020               Content Version: 13.0  © 2006-2021 Healthwise, Inc[employer]. Care instructions adapted under license by [facility] ([facility]). If you have questions about a medical condition or this instruction, always ask your healthcare professional. [place] [address] any warranty or liability for your use of this information.

## 2021-12-09 NOTE — PROGRESS NOTES
12/9/2021    Chief Complaint   Patient presents with    Diabetes     3 mon f/u       Benedicto Enrique is a 45 y.o. female, presents today for follow up of Diabetes           HPI  Biometric screening form to be partially completed; waiting on A1C-- will check for result and complete form for patient to . Diabetes Mellitus Type 2  Managed by endocrinology- scheduled every 3 months with Dr. Ilia Armendariz MD  Fasting labs ordered by endocrinology done prior to appointment today. Treatment Adherence:   Medication compliance:  compliant all of the time  Diet compliance:  compliant all of the time- carb counting, cutting out carbs, drinks water and unsweetened ice tea. Weight trend: decreasing- down 19 lbs in 3 months, down 31 lb total in 7 months. Current exercise: regular exercise- walking 2 miles a day and planning to start weight training. Denies blurry vision, foot ulcerations, neuropathy, polyphagia, polyuria, polydipsia, nausea, vomiting and diarrhea. Home blood sugar records: Good control. Log reviewed. Fasting range: consistently under 100 (endocrinology wants blood sugar below 120 in am). Non-fasting ranges from 90s to low-100s. Any episodes of hypoglycemia? Yes, prior to discontinuation of Glyburide-- none within the past 2.5 months. Eye exam current (within one year): no- Appt scheduled next week. Tobacco history: She  reports that she has never smoked. She has never used smokeless tobacco.   Daily Aspirin? Yes  Known diabetic complications: none  Ace Inhibitor: None, typically runs hypotensive. No history of hyperlipidemia  No new myalgias or GI upset on atorvastatin (Lipitor) 10 mg- takes as preventative with diabetes. She is adherent to diet low in saturated fats.      Lab Results       Component                Value               Date                       LABA1C                      6.2                 09/09/2021                 LABA1C                      6.3 06/09/2021            Lab Results       Component                Value               Date                       CREATININE               0.6                 06/09/2021            Lab Results       Component                Value               Date                       ALT                             16                  06/09/2021                 AST                             24                  06/09/2021            Lab Results       Component                Value               Date                       Cholesterol                 146                 06/9/2021       Triglyceride                  60                  06/9/2021        HDL                            60                  06/09/2021                 LDLCALC                    74                  06/09/2021            Ref Range & Units 6/9/21 0920    Total CK 26 - 192 U/L 101        Ref Range & Units 6/9/21 0920    Sodium 136 - 145 mmol/L 139    Potassium 3.5 - 5.1 mmol/L 5.1    Chloride 99 - 110 mmol/L 100    CO2 21 - 32 mmol/L 24    Anion Gap 3 - 16 15    Glucose, Fasting 70 - 99 mg/dL 110 High     BUN 7 - 20 mg/dL 10    CREATININE 0.6 - 1.1 mg/dL 0.6    GFR Non- >60 >60    Comment: >60 mL/min/1.73m2 EGFR, calc. for ages 25 and older using the   MDRD formula (not corrected for weight), is valid for stable   renal function. GFR  >60 >60    Comment: Chronic Kidney Disease: less than 60 ml/min/1.73 sq. m.         Kidney Failure: less than 15 ml/min/1.73 sq.m. Results valid for patients 18 years and older. Calcium 8.3 - 10.6 mg/dL 9.2    Total Protein 6.4 - 8.2 g/dL 7.4    Albumin 3.4 - 5.0 g/dL 4.5    Albumin/Globulin Ratio 1.1 - 2.2 1.6    Total Bilirubin 0.0 - 1.0 mg/dL <0.2    Alkaline Phosphatase 40 - 129 U/L 95    ALT 10 - 40 U/L 16    AST 15 - 37 U/L 24    Globulin g/dL 2.9        Review of Systems   Constitutional: Negative for activity change, appetite change, fatigue and unexpected weight change. Eyes: Negative for visual disturbance. Respiratory: Negative for apnea, cough, shortness of breath and wheezing. Cardiovascular: Negative for chest pain, palpitations and leg swelling. Gastrointestinal: Negative for abdominal pain, diarrhea, nausea and vomiting. Endocrine: Negative for polydipsia, polyphagia and polyuria. Genitourinary: Negative. Musculoskeletal: Negative for arthralgias, gait problem and myalgias. Skin: Negative. Neurological: Negative for dizziness, syncope, weakness, light-headedness and headaches. Psychiatric/Behavioral: Negative for dysphoric mood and sleep disturbance. The patient is not nervous/anxious. Current Outpatient Medications Prior to Visit   Medication Sig Dispense Refill    insulin glargine (LANTUS SOLOSTAR) 100 UNIT/ML injection pen Inject 16 Units into the skin nightly 10 pen 3    Insulin Pen Needle (PEN NEEDLES 3/16\") 31G X 5 MM MISC Use to inject insulin daily. 100 each 3    atorvastatin (LIPITOR) 10 MG tablet Take 1 tablet by mouth daily 90 tablet 1    Liraglutide (VICTOZA) 18 MG/3ML SOPN SC injection Inject 1.2 mg into the skin daily 3 pen 2    Empagliflozin-metFORMIN HCl 5-1000 MG TABS 1 tab bid 180 tablet 3    vitamin D 25 MCG (1000 UT) CAPS Take 1 capsule by mouth daily      aspirin 81 MG tablet Take 81 mg by mouth daily       No current facility-administered medications for this visit. Allergies   Allergen Reactions    Zithromax [Azithromycin] Swelling     Facial swelling and itching.  Diflucan [Fluconazole] Other (See Comments)     Slight lip swelling with itching.  Patient reports Lyn Medel is able to take for a bad yeast infection and takes [employer]"     Past Medical History:   Diagnosis Date    Diabetes ([facility] [address]     Hyperlipidemia      Past Surgical History:   Procedure Laterality Date    BREAST REDUCTION SURGERY       SECTION  80    CHOLECYSTECTOMY      OTHER SURGICAL HISTORY  2015    EXCISION OF HYDRADENITIS RIGHT AXILLA       Social History     Tobacco Use    Smoking status: Never Smoker    Smokeless tobacco: Never Used   Substance Use Topics    Alcohol use: No      Family History   Problem Relation Age of Onset    Diabetes Mother     High Blood Pressure Mother     Diabetes Father     Cancer Father         multiple myeloma    High Blood Pressure Father         Vitals:    12/09/21 0746   BP: 106/68   Site: Left Upper Arm   Position: Sitting   Cuff Size: Medium Adult   Pulse: 81   Temp: 96.4 °F (35.8 °C)   SpO2: 98%   Weight: 219 lb 3.2 oz (99.4 kg)   Height: 5' 8\" (1.727 m)     Estimated body mass index is 33.33 kg/m² as calculated from the following:    Height as of this encounter: 5' 8\" (1.727 m). Weight as of this encounter: 219 lb 3.2 oz (99.4 kg). Physical Exam  Vitals and nursing note reviewed. Constitutional:       General: She is not in acute distress. Appearance: Normal appearance. She is obese. Neck:      Vascular: No carotid bruit. Cardiovascular:      Rate and Rhythm: Normal rate and regular rhythm. Pulses: Normal pulses. Heart sounds: Normal heart sounds, S1 normal and S2 normal.   Pulmonary:      Effort: Pulmonary effort is normal.      Breath sounds: Normal breath sounds. Musculoskeletal:         General: Normal range of motion. Cervical back: Normal range of motion and neck supple. Right lower leg: No edema. Left lower leg: No edema. Lymphadenopathy:      Cervical: No cervical adenopathy. Skin:     General: Skin is warm. Neurological:      General: No focal deficit present. Mental Status: She is alert and oriented to person, place, and time. Psychiatric:         Mood and Affect: Mood normal.         Behavior: Behavior normal.         ASSESSMENT/PLAN:  1.  Type 2 diabetes mellitus without complication, with long-term current use of insulin (HCC)  - Improved  - Managed by endocrinologist, Dr. Charles Salomon current medications  - Continue atorvastatin (LIPITOR) 10 MG tablet; Take 1 tablet by mouth daily  Dispense: 90 tablet; Refill: 1  - Continue to check blood sugar 4 x/day and log.   - Continue to work towards weight loss  - Continue diabetic diet  - Continue regular exercise. 2. Class 1 obesity due to excess calories with serious comorbidity and body mass index (BMI) of 33.0 to 33.9 in adult  - Improving  - BMI down from 36.19 to 33.33 today  - 31 lb weight loss in 7 months. 3. Lipid screening  - Lipid, Fasting; Future      Return in about 6 months (around 6/9/2022) for 6 mo follow up of lipids (diabetes prevention)- Continue scheduled appointments with endocrinology. Discussed use, benefit, and side effects of prescribed medications. Patient's questions answered and concerns addressed. Patient agrees to plan of care. My scheduled days in the office reviewed with patient, and same day appointments available. Encouraged to use [employer] for communication as needed. Electronically signed by MARU Grady CNP on 12/9/2021 at 8:27 AM       This dictation was generated by voice recognition computer software. Although all attempts are made to edit the dictation for accuracy, there may be errors in the transcription that are not intended.

## 2021-12-10 LAB
ESTIMATED AVERAGE GLUCOSE: 125.5 MG/DL
HBA1C MFR BLD: 6 %

## 2021-12-13 ENCOUNTER — TELEPHONE (OUTPATIENT)
Dept: PRIMARY CARE CLINIC | Age: 51
End: 2021-12-13

## 2021-12-13 ENCOUNTER — PATIENT MESSAGE (OUTPATIENT)
Dept: ENDOCRINOLOGY | Age: 51
End: 2021-12-13

## 2021-12-13 DIAGNOSIS — E11.9 TYPE 2 DIABETES MELLITUS WITHOUT COMPLICATION, WITH LONG-TERM CURRENT USE OF INSULIN (HCC): ICD-10-CM

## 2021-12-13 DIAGNOSIS — Z79.4 TYPE 2 DIABETES MELLITUS WITHOUT COMPLICATION, WITH LONG-TERM CURRENT USE OF INSULIN (HCC): ICD-10-CM

## 2021-12-13 RX ORDER — INSULIN GLARGINE 100 [IU]/ML
16 INJECTION, SOLUTION SUBCUTANEOUS NIGHTLY
Qty: 5 PEN | Refills: 3 | Status: SHIPPED | OUTPATIENT
Start: 2021-12-13 | End: 2022-06-09 | Stop reason: ALTCHOICE

## 2021-12-13 RX ORDER — ATORVASTATIN CALCIUM 10 MG/1
10 TABLET, FILM COATED ORAL DAILY
Qty: 90 TABLET | Refills: 1 | Status: SHIPPED | OUTPATIENT
Start: 2021-12-13 | End: 2022-03-21 | Stop reason: SDUPTHER

## 2021-12-13 NOTE — TELEPHONE ENCOUNTER
From: Adina Kumari  To: Dr. Orellana Friend  Sent: 12/13/2021 9:50 AM EST  Subject: Refill    Hello. I actually need refills on everything, Victoza, Lantus, Synjardy & Atorvastin. [facility]. Im requesting now because I wont have this insurance in January & they cover more than my new insurance.      Thank you    Jona Bahena

## 2021-12-14 ENCOUNTER — TELEPHONE (OUTPATIENT)
Dept: PRIMARY CARE CLINIC | Age: 51
End: 2021-12-14

## 2021-12-30 ENCOUNTER — VIRTUAL VISIT (OUTPATIENT)
Dept: ENDOCRINOLOGY | Age: 51
End: 2021-12-30
Payer: COMMERCIAL

## 2021-12-30 DIAGNOSIS — E66.09 CLASS 1 OBESITY DUE TO EXCESS CALORIES WITH SERIOUS COMORBIDITY AND BODY MASS INDEX (BMI) OF 33.0 TO 33.9 IN ADULT: ICD-10-CM

## 2021-12-30 DIAGNOSIS — E11.9 TYPE 2 DIABETES MELLITUS WITHOUT COMPLICATION, WITH LONG-TERM CURRENT USE OF INSULIN (HCC): ICD-10-CM

## 2021-12-30 DIAGNOSIS — Z79.4 TYPE 2 DIABETES MELLITUS WITHOUT COMPLICATION, WITH LONG-TERM CURRENT USE OF INSULIN (HCC): ICD-10-CM

## 2021-12-30 DIAGNOSIS — E78.2 MIXED HYPERLIPIDEMIA: Primary | ICD-10-CM

## 2021-12-30 PROCEDURE — 99214 OFFICE O/P EST MOD 30 MIN: CPT | Performed by: INTERNAL MEDICINE

## 2021-12-30 NOTE — PROGRESS NOTES
Kandis Vee is a 45 y.o. female is referred to me by Dr Jesus Loya  for evaluation and management of uncontrolled Type 2 diabetes. Belen Barbosa was diagnosed with Diabetes mellitus at age 38 . Patient had gestational diabetes with her 2 pregnancies  Never had DKA  She was on NPH insulin and was having frequent hypoglycemia was also taking glipizide at that time once we stopped the dose to her hypoglycemia resolved completely. At the time of diagnosis patient had dizzy spells and thirsty . Belen Barbosa got diabetic education in the past.  Comorbid conditions: none  She was initially diet controlled and then on Victoza for years and was later switched to insulin and glipzide, on review of the chart it appears that she was on Jardiance and Januvia at one point  She has hyperlipidemia and is on statins but she feels it is mostly preventive  Previously had hypotension with lisinopril and her medications were stopped    INTERIM:    Diabetes  She presents for her follow-up diabetic visit. She has type 2 diabetes mellitus. The initial diagnosis of diabetes was made 10 years ago. Her disease course has been stable. Hypoglycemia symptoms include dizziness and hunger. There are no diabetic associated symptoms. Symptoms are improving. Current diabetic treatment includes insulin injections and oral agent (dual therapy). She is compliant with treatment most of the time. She is following a generally healthy diet. Meal planning includes avoidance of concentrated sweets. She has had a previous visit with a dietitian. Her breakfast blood glucose is taken between 7-8 am. Her breakfast blood glucose range is generally  mg/dl. Her lunch blood glucose range is generally <70 mg/dl. She lost 20 lbs since last visit   She had Covid and also has been walking and watching her diet and she feels the combination of all these led to her 20 pounds weight loss.   Denies any hypoglycemia since stopped sulfonylurea as well as NPH  Attends Jewish Services: Not on file    Active Member of Clubs or Organizations: Not on file    Attends Club or Organization Meetings: Not on file    Marital Status: Not on file   Intimate Partner Violence:     Fear of Current or Ex-Partner: Not on file    Emotionally Abused: Not on file    Physically Abused: Not on file    Sexually Abused: Not on file   Housing Stability:     Unable to Pay for Housing in the Last Year: Not on file    Number of [place] in the Last Year: Not on file    Unstable Housing in the Last Year: Not on file     Family History   Problem Relation Age of Onset    Diabetes Mother     High Blood Pressure Mother     Diabetes Father     Cancer Father         multiple myeloma    High Blood Pressure Father      Current Outpatient Medications   Medication Sig Dispense Refill    Liraglutide (VICTOZA) 18 MG/3ML SOPN SC injection Inject 1.8 mg into the skin daily 3 pen 2    Empagliflozin-metFORMIN HCl 5-1000 MG TABS 1 tab bid 180 tablet 3    atorvastatin (LIPITOR) 10 MG tablet Take 1 tablet by mouth daily 90 tablet 1    insulin glargine (LANTUS SOLOSTAR) 100 UNIT/ML injection pen Inject 16 Units into the skin nightly 5 pen 3    Insulin Pen Needle (PEN NEEDLES 3/16\") 31G X 5 MM MISC Use to inject insulin daily. 100 each 3    vitamin D 25 MCG (1000 UT) CAPS Take 1 capsule by mouth daily      aspirin 81 MG tablet Take 81 mg by mouth daily       No current facility-administered medications for this visit. Allergies   Allergen Reactions    Zithromax [Azithromycin] Swelling     Facial swelling and itching.  Diflucan [Fluconazole] Other (See Comments)     Slight lip swelling with itching.  Patient reports Raj Cardenas is able to take for a bad yeast infection and takes Benadryl\"     Family Status   Relation Name Status    Mother  Alive    Father           OBJECTIVE:  LMP 2015    Wt Readings from Last 3 Encounters:   21 219 lb 3.2 oz (99.4 kg)   21 238 lb (108 kg)   06/09/21 236 lb (107 kg)       Constitutional: no acute distress, well appearing and well nourished  Psychiatric: oriented to person, place and time, judgement and insight and normal, recent and remote memory intact and mood and affect are normal  Skin: skin and subcutaneous tissue is normal without visible mass,   Head and Face: visual inspection  of head and face revealed no abnormalities  Eyes: visual inspection showed no lid or conjunctival swelling, erythema or discharge, pupils are normal, equal, round  Ears/Nose: external inspection of ears and nose revealed no abnormalities, hearing is grossly normal  Oropharynx/Mouth/Face: lips, tongue and gums appear  normal with no lesions, the voice quality was normal  Neck: neck appears symmetric, with no visible masses,   Pulmonary: no increased work of breathing or signs of respiratory distress,  Musculoskeletal: normal on inspection    Neurological: normal coordination and normal general cortical function      Lab Results   Component Value Date    LABA1C 6.0 12/09/2021    LABA1C 6.2 09/09/2021    LABA1C 6.3 06/09/2021         ASSESSMENT/PLAN:      ---- Type 2 diabetes mellitus without complication, with long-term current use of insulin A1c 6.2 in September 2021    --- A1c is at target at 6.0  --- Currently patient is taking Lantus 16  units daily  --- Synjardy BID    --- Victoza 1.2 mg, will increase the dose to 1.8 mg, we also discussed switching to weekly preparation. Patient will find out from her pharmacy/insurance if that is covered in that case will be switched to Ozempic or Trulicity. Hypoglycemia protocol reviewed in detail with patient Patient was advised to carry glucose tablets and also have glucagon emergency kit. Patient was advised that sending of her fingerstick blood glucose logs is crucial in management of her  diabetes. I will adjust the  doses of diabetic medications  according to sent data.      Health Maintenance       [facility]

## 2022-01-18 ENCOUNTER — HOSPITAL ENCOUNTER (OUTPATIENT)
Dept: WOMENS IMAGING | Age: 52
Discharge: HOME | End: 2022-01-18
Payer: COMMERCIAL

## 2022-01-18 VITALS — HEIGHT: 67 IN | BODY MASS INDEX: 32.8 KG/M2 | WEIGHT: 209 LBS

## 2022-01-18 DIAGNOSIS — Z12.31 BREAST CANCER SCREENING BY MAMMOGRAM: ICD-10-CM

## 2022-01-18 PROCEDURE — 77063 BREAST TOMOSYNTHESIS BI: CPT

## 2022-03-21 ENCOUNTER — PATIENT MESSAGE (OUTPATIENT)
Dept: ENDOCRINOLOGY | Age: 52
End: 2022-03-21

## 2022-03-21 DIAGNOSIS — E11.9 TYPE 2 DIABETES MELLITUS WITHOUT COMPLICATION, WITH LONG-TERM CURRENT USE OF INSULIN (HCC): ICD-10-CM

## 2022-03-21 DIAGNOSIS — Z79.4 TYPE 2 DIABETES MELLITUS WITHOUT COMPLICATION, WITH LONG-TERM CURRENT USE OF INSULIN (HCC): ICD-10-CM

## 2022-03-21 RX ORDER — ATORVASTATIN CALCIUM 10 MG/1
10 TABLET, FILM COATED ORAL DAILY
Qty: 90 TABLET | Refills: 1 | Status: SHIPPED | OUTPATIENT
Start: 2022-03-21 | End: 2022-07-26

## 2022-03-21 RX ORDER — ATORVASTATIN CALCIUM 10 MG/1
10 TABLET, FILM COATED ORAL DAILY
Qty: 90 TABLET | Refills: 1 | Status: CANCELLED | OUTPATIENT
Start: 2022-03-21 | End: 2022-06-19

## 2022-03-21 RX ORDER — PEN NEEDLE, DIABETIC 30 GX5/16"
NEEDLE, DISPOSABLE MISCELLANEOUS
Qty: 100 EACH | Refills: 3 | Status: SHIPPED | OUTPATIENT
Start: 2022-03-21 | End: 2022-04-01 | Stop reason: SDUPTHER

## 2022-03-21 NOTE — TELEPHONE ENCOUNTER
From: Magnus Ibarra  To: Dr. Angel Byrd  Sent: 3/21/2022 7:12 AM EDT  Subject: Refills    Good Morning. I put a request in for refills of Victoza & Atorvastin. I didnt see Synjardy or pen needles but I need them as well. Pharmacy is [facility] order, thank you!

## 2022-04-01 ENCOUNTER — PATIENT MESSAGE (OUTPATIENT)
Dept: ENDOCRINOLOGY | Age: 52
End: 2022-04-01

## 2022-04-01 DIAGNOSIS — Z79.4 TYPE 2 DIABETES MELLITUS WITHOUT COMPLICATION, WITH LONG-TERM CURRENT USE OF INSULIN (HCC): ICD-10-CM

## 2022-04-01 DIAGNOSIS — E11.9 TYPE 2 DIABETES MELLITUS WITHOUT COMPLICATION, WITH LONG-TERM CURRENT USE OF INSULIN (HCC): ICD-10-CM

## 2022-04-01 RX ORDER — PEN NEEDLE, DIABETIC 30 GX5/16"
NEEDLE, DISPOSABLE MISCELLANEOUS
Qty: 200 EACH | Refills: 5 | Status: SHIPPED | OUTPATIENT
Start: 2022-04-01

## 2022-04-01 NOTE — TELEPHONE ENCOUNTER
From: Fred Bourgeois  To: Dr. Maverick Kidd  Sent: 4/1/2022 10:34 AM EDT  Subject: Pen refill    GM. I need a refill of pen needles sent to [facility] order pharmacy. They have an order that was filled in January for 90days with quantity of 100 but since I take Victoza & Lantus it wasnt enough.  Thanks

## 2022-05-02 DIAGNOSIS — E11.9 TYPE 2 DIABETES MELLITUS WITHOUT COMPLICATION, WITH LONG-TERM CURRENT USE OF INSULIN (HCC): ICD-10-CM

## 2022-05-02 DIAGNOSIS — Z13.220 LIPID SCREENING: ICD-10-CM

## 2022-05-02 DIAGNOSIS — Z79.4 TYPE 2 DIABETES MELLITUS WITHOUT COMPLICATION, WITH LONG-TERM CURRENT USE OF INSULIN (HCC): ICD-10-CM

## 2022-05-02 DIAGNOSIS — E78.2 MIXED HYPERLIPIDEMIA: ICD-10-CM

## 2022-05-02 DIAGNOSIS — E66.09 CLASS 1 OBESITY DUE TO EXCESS CALORIES WITH SERIOUS COMORBIDITY AND BODY MASS INDEX (BMI) OF 33.0 TO 33.9 IN ADULT: ICD-10-CM

## 2022-05-02 LAB
A/G RATIO: 1.8 (ref 1.1–2.2)
ALBUMIN SERPL-MCNC: 4.3 G/DL (ref 3.4–5)
ALP BLD-CCNC: 77 U/L (ref 40–129)
ALT SERPL-CCNC: 11 U/L (ref 10–40)
ANION GAP SERPL CALCULATED.3IONS-SCNC: 11 MMOL/L (ref 3–16)
AST SERPL-CCNC: 15 U/L (ref 15–37)
BILIRUB SERPL-MCNC: 0.3 MG/DL (ref 0–1)
BUN BLDV-MCNC: 12 MG/DL (ref 7–20)
CALCIUM SERPL-MCNC: 9 MG/DL (ref 8.3–10.6)
CHLORIDE BLD-SCNC: 101 MMOL/L (ref 99–110)
CO2: 26 MMOL/L (ref 21–32)
CREAT SERPL-MCNC: <0.5 MG/DL (ref 0.6–1.1)
CREATININE URINE: 100.1 MG/DL (ref 28–259)
GFR AFRICAN AMERICAN: >60
GFR NON-AFRICAN AMERICAN: >60
GLUCOSE BLD-MCNC: 84 MG/DL (ref 70–99)
MICROALBUMIN UR-MCNC: <1.2 MG/DL
MICROALBUMIN/CREAT UR-RTO: NORMAL MG/G (ref 0–30)
POTASSIUM SERPL-SCNC: 3.7 MMOL/L (ref 3.5–5.1)
SODIUM BLD-SCNC: 138 MMOL/L (ref 136–145)
TOTAL PROTEIN: 6.7 G/DL (ref 6.4–8.2)
TSH SERPL DL<=0.05 MIU/L-ACNC: 1.49 UIU/ML (ref 0.27–4.2)

## 2022-05-03 LAB
CHOLESTEROL, TOTAL: 126 MG/DL (ref 0–199)
ESTIMATED AVERAGE GLUCOSE: 119.8 MG/DL
HBA1C MFR BLD: 5.8 %
HDLC SERPL-MCNC: 58 MG/DL (ref 40–60)
LDL CHOLESTEROL CALCULATED: 58 MG/DL
TRIGL SERPL-MCNC: 52 MG/DL (ref 0–150)
VLDLC SERPL CALC-MCNC: 10 MG/DL

## 2022-05-06 ENCOUNTER — OFFICE VISIT (OUTPATIENT)
Dept: ENDOCRINOLOGY | Age: 52
End: 2022-05-06
Payer: COMMERCIAL

## 2022-05-06 VITALS
DIASTOLIC BLOOD PRESSURE: 57 MMHG | RESPIRATION RATE: 16 BRPM | SYSTOLIC BLOOD PRESSURE: 94 MMHG | BODY MASS INDEX: 30.76 KG/M2 | WEIGHT: 196 LBS | HEART RATE: 77 BPM | HEIGHT: 67 IN

## 2022-05-06 DIAGNOSIS — E11.9 TYPE 2 DIABETES MELLITUS WITHOUT COMPLICATION, WITH LONG-TERM CURRENT USE OF INSULIN (HCC): ICD-10-CM

## 2022-05-06 DIAGNOSIS — Z79.4 TYPE 2 DIABETES MELLITUS WITHOUT COMPLICATION, WITH LONG-TERM CURRENT USE OF INSULIN (HCC): ICD-10-CM

## 2022-05-06 PROCEDURE — 3044F HG A1C LEVEL LT 7.0%: CPT | Performed by: INTERNAL MEDICINE

## 2022-05-06 PROCEDURE — 99214 OFFICE O/P EST MOD 30 MIN: CPT | Performed by: INTERNAL MEDICINE

## 2022-05-06 NOTE — PROGRESS NOTES
Yvette Aragon is a 47 y.o. female is seen for management of uncontrolled Type 2 diabetes. Belen Barbosa was diagnosed with Diabetes mellitus at age 38 . Patient had gestational diabetes with her 2 pregnancies  Never had DKA  She was on NPH insulin and was having frequent hypoglycemia was also taking glipizide at that time once we stopped the dose to her hypoglycemia resolved completely. At the time of diagnosis patient had dizzy spells and thirsty . Belen Barbosa got diabetic education in the past.  Comorbid conditions: none  She was initially diet controlled and then on Victoza for years and was later switched to insulin and glipzide, on review of the chart it appears that she was on Jardiance and Januvia at one point  She has hyperlipidemia and is on statins but she feels it is mostly preventive  Previously had hypotension with lisinopril and her medications were stopped    INTERIM:    Diabetes  She presents for her follow-up diabetic visit. She has type 2 diabetes mellitus. The initial diagnosis of diabetes was made 10 years ago. Her disease course has been stable. Hypoglycemia symptoms include dizziness and hunger. There are no diabetic associated symptoms. Symptoms are improving. Current diabetic treatment includes insulin injections and oral agent (dual therapy). She is compliant with treatment most of the time. She is following a generally healthy diet. Meal planning includes avoidance of concentrated sweets. She has had a previous visit with a dietitian. Her breakfast blood glucose is taken between 7-8 am. Her breakfast blood glucose range is generally  mg/dl. Her lunch blood glucose range is generally <70 mg/dl. She has lost 53 lbs from May 2021 to May 2022 she has been working on diet and exercise daily with her dog. She is taking 8 units of Lantus a day with some fasting glucose in the 70 range. Denies any hypoglycemia since stopped sulfonylurea as well as NPH insulin.         Past Medical History:   Diagnosis Date    Diabetes ([facility] [address]     Hyperlipidemia       Patient Active Problem List   Diagnosis    Hidradenitis axillaris    Sprain of anterior talofibular ligament of left ankle    Contusion of left knee    Type 2 diabetes mellitus without complication, with long-term current use of insulin ([facility]    Class 1 obesity due to excess calories with serious comorbidity and body mass index (BMI) of 33.0 to 33.9 in adult     Past Surgical History:   Procedure Laterality Date    BREAST REDUCTION SURGERY       SECTION  97    CHOLECYSTECTOMY      OTHER SURGICAL HISTORY  2015    EXCISION OF HYDRADENITIS RIGHT AXILLA      Social History     Socioeconomic History    Marital status:      Spouse name: Not on file    Number of children: Not on file    Years of education: Not on file    Highest education level: Not on file   Occupational History    Not on file   Tobacco Use    Smoking status: Never Smoker    Smokeless tobacco: Never Used   Vaping Use    Vaping Use: Not on file   Substance and Sexual Activity    Alcohol use: No    Drug use: No    Sexual activity: Not on file   Other Topics Concern    Not on file   Social History Narrative    Not on file     Social Determinants of Health     Financial Resource Strain:     Difficulty of Paying Living Expenses: Not on file   Food Insecurity:     Worried About Running Out of Food in the Last Year: Not on file    Hipolito of Food in the Last Year: Not on file   Transportation Needs:     Lack of Transportation (Medical): Not on file    Lack of Transportation (Non-Medical):  Not on file   Physical Activity:     Days of Exercise per Week: Not on file    Minutes of Exercise per Session: Not on file   Stress:     Feeling of Stress : Not on file   Social Connections:     Frequency of Communication with Friends and Family: Not on file    Frequency of Social Gatherings with Friends and Family: Not on file    Attends Gnosticist Services: Not on file    Active Member of Clubs or Organizations: Not on file    Attends Club or Organization Meetings: Not on file    Marital Status: Not on file   Intimate Partner Violence:     Fear of Current or Ex-Partner: Not on file    Emotionally Abused: Not on file    Physically Abused: Not on file    Sexually Abused: Not on file   Housing Stability:     Unable to Pay for Housing in the Last Year: Not on file    Number of [place] in the Last Year: Not on file    Unstable Housing in the Last Year: Not on file     Family History   Problem Relation Age of Onset    Diabetes Mother     High Blood Pressure Mother     Diabetes Father     Cancer Father         multiple myeloma    High Blood Pressure Father      Current Outpatient Medications   Medication Sig Dispense Refill    Empagliflozin-metFORMIN HCl 12.5-1000 MG TABS 1 tab BID 60 tablet 5    Semaglutide, 1 MG/DOSE, 2 MG/1.5ML SOPN 1 mg weekly 2 pen 5    Insulin Pen Needle (PEN NEEDLES 3/16\") 31G X 5 MM MISC Use to inject twice daily. 200 each 5    Liraglutide (VICTOZA) 18 MG/3ML SOPN SC injection Inject 1.8 mg into the skin daily 3 pen 2    atorvastatin (LIPITOR) 10 MG tablet Take 1 tablet by mouth daily 90 tablet 1    insulin glargine (LANTUS SOLOSTAR) 100 UNIT/ML injection pen Inject 16 Units into the skin nightly (Patient taking differently: Inject 8 Units into the skin nightly ) 5 pen 3    vitamin D 25 MCG (1000 UT) CAPS Take 1 capsule by mouth daily      aspirin 81 MG tablet Take 81 mg by mouth daily       No current facility-administered medications for this visit. Allergies   Allergen Reactions    Zithromax [Azithromycin] Swelling     Facial swelling and itching.  Diflucan [Fluconazole] Other (See Comments)     Slight lip swelling with itching.  Patient reports Trista Garcia is able to take for a bad yeast infection and takes Benadryl\"     Family Status   Relation Name Status    Mother  Alive    Father   HANNAH  I have reviewed the review of system questionnaire filled by the patient .   Patient was advised to contact PCP for non endocrine signs and symptoms       OBJECTIVE:  BP (!) 94/57   Pulse 77   Resp 16   Ht 5' 7\" (1.702 m)   Wt 196 lb (88.9 kg)   LMP 11/24/2015   BMI 30.70 kg/m²    Wt Readings from Last 3 Encounters:   05/06/22 196 lb (88.9 kg)   01/18/22 209 lb (94.8 kg)   12/09/21 219 lb 3.2 oz (99.4 kg)       Constitutional: no acute distress, well appearing, well nourished  Psychiatric: oriented to person, place and time, judgement, insight and normal, recent and remote memory and intact and mood, affect are normal  Skin: skin and subcutaneous tissue is normal without mass,   Head and Face: examination of head and face revealed no abnormalities  Eyes: no lid or conjunctival swelling, no erythema or discharge, pupils are normal,   Ears/Nose: external inspection of ears and nose revealed no abnormalities, hearing is grossly normal  Oropharynx/Mouth/Face: lips, tongue and gums are normal with no lesions, the voice quality was normal  Neck: neck is supple and symmetric, with midline trachea and no masses, thyroid is normal    Pulmonary: no increased work of breathing or signs of respiratory distress, lungs are clear to auscultation  Cardiovascular: normal heart rate and rhythm, normal S1 and S2,   Musculoskeletal: normal gait and station,   Neurological: normal coordination, normal general cortical function        Lab Results   Component Value Date    LABA1C 5.8 05/02/2022    LABA1C 6.0 12/09/2021    LABA1C 6.2 09/09/2021         ASSESSMENT/PLAN:      ---- Type 2 diabetes mellitus without complication, with long-term current use of insulin A1c 6.2 in September 2021    --- A1c is at target at 6.0>>5.8   --- Currently patient is taking Lantus 8  units daily fasting glucose are running 76--90   She will stop Lantus now     --- Synjardy BID  Increase to 12.5/1000 mg BID   --- Victoza 1.8 mg, we will switch her to weekly Ozempic if her insurance covers. I gave her a sample of Ozempic and sent prescription for Ozempic    Hypoglycemia protocol reviewed in detail with patient Patient was advised to carry glucose tablets. Patient was advised that sending of her fingerstick blood glucose logs is crucial in management of her  diabetes. I will adjust the  doses of diabetic medications  according to sent data. Health Maintenance       Last Eye Exam: advised to have annual dilated eye exam. her last eye exam was in dec 2021   Last Podiatry Exam:  Last foot exam was with primary care physician we discussed foot care  Lipids: Last LDL level was 74 in June 2021  Microalbumin/Creatinine Ratio: Normal      --- Mixed hyperlipidemia    Patient is on statin  LDL 74 in June 2021  Continue with the current medication.    ---- Long-term insulin use ([address]  Continues to be on basal insulin. Reviewed and/or ordered clinical lab results yes   Reviewed and/or ordered radiology tests Yes  Reviewed and/or ordered other diagnostic tests yes   Made a decision to obtain old records yes   Reviewed and summarized old records yes     Belen Barbosa was counseled regarding symptoms of current diagnosis, course and complications of disease if inadequately treated, side effects of medications, diagnosis, treatment options, and prognosis, risks, benefits, complications, and alternatives of treatment, labs, imaging and other studies and treatment targets and goals. She understands instructions and counseling      Return in about 3 months (around 8/6/2022). Please note that some or all of this report was generated using voice recognition software. Please notify me in case of any questions about the content of this document, as some errors in transcription may have occurred .

## 2022-06-09 ENCOUNTER — OFFICE VISIT (OUTPATIENT)
Dept: PRIMARY CARE CLINIC | Age: 52
End: 2022-06-09
Payer: COMMERCIAL

## 2022-06-09 VITALS
SYSTOLIC BLOOD PRESSURE: 98 MMHG | HEIGHT: 67 IN | OXYGEN SATURATION: 98 % | DIASTOLIC BLOOD PRESSURE: 58 MMHG | BODY MASS INDEX: 29.26 KG/M2 | WEIGHT: 186.4 LBS | HEART RATE: 73 BPM

## 2022-06-09 DIAGNOSIS — E11.9 TYPE 2 DIABETES MELLITUS WITHOUT COMPLICATION, WITH LONG-TERM CURRENT USE OF INSULIN (HCC): ICD-10-CM

## 2022-06-09 DIAGNOSIS — Z79.4 TYPE 2 DIABETES MELLITUS WITHOUT COMPLICATION, WITH LONG-TERM CURRENT USE OF INSULIN (HCC): ICD-10-CM

## 2022-06-09 DIAGNOSIS — Z00.00 HEALTH CARE MAINTENANCE: ICD-10-CM

## 2022-06-09 DIAGNOSIS — Z00.00 ANNUAL PHYSICAL EXAM: Primary | ICD-10-CM

## 2022-06-09 PROCEDURE — 99396 PREV VISIT EST AGE 40-64: CPT | Performed by: NURSE PRACTITIONER

## 2022-06-09 RX ORDER — CALCIUM CARBONATE 200(500)MG
1 TABLET,CHEWABLE ORAL DAILY
COMMUNITY

## 2022-06-09 ASSESSMENT — PATIENT HEALTH QUESTIONNAIRE - PHQ9
10. IF YOU CHECKED OFF ANY PROBLEMS, HOW DIFFICULT HAVE THESE PROBLEMS MADE IT FOR YOU TO DO YOUR WORK, TAKE CARE OF THINGS AT HOME, OR GET ALONG WITH OTHER PEOPLE: 0
SUM OF ALL RESPONSES TO PHQ QUESTIONS 1-9: 0
5. POOR APPETITE OR OVEREATING: 0
1. LITTLE INTEREST OR PLEASURE IN DOING THINGS: 0
4. FEELING TIRED OR HAVING LITTLE ENERGY: 0
8. MOVING OR SPEAKING SO SLOWLY THAT OTHER PEOPLE COULD HAVE NOTICED. OR THE OPPOSITE, BEING SO FIGETY OR RESTLESS THAT YOU HAVE BEEN MOVING AROUND A LOT MORE THAN USUAL: 0
SUM OF ALL RESPONSES TO PHQ QUESTIONS 1-9: 0
3. TROUBLE FALLING OR STAYING ASLEEP: 0
2. FEELING DOWN, DEPRESSED OR HOPELESS: 0
9. THOUGHTS THAT YOU WOULD BE BETTER OFF DEAD, OR OF HURTING YOURSELF: 0
SUM OF ALL RESPONSES TO PHQ9 QUESTIONS 1 & 2: 0
7. TROUBLE CONCENTRATING ON THINGS, SUCH AS READING THE NEWSPAPER OR WATCHING TELEVISION: 0
SUM OF ALL RESPONSES TO PHQ QUESTIONS 1-9: 0
SUM OF ALL RESPONSES TO PHQ QUESTIONS 1-9: 0
6. FEELING BAD ABOUT YOURSELF - OR THAT YOU ARE A FAILURE OR HAVE LET YOURSELF OR YOUR FAMILY DOWN: 0

## 2022-06-09 ASSESSMENT — ENCOUNTER SYMPTOMS
COUGH: 0
CHEST TIGHTNESS: 0
SHORTNESS OF BREATH: 0
DIARRHEA: 0
CONSTIPATION: 0
ABDOMINAL DISTENTION: 0
ABDOMINAL PAIN: 0
BLOOD IN STOOL: 0
WHEEZING: 0

## 2022-06-09 NOTE — PATIENT INSTRUCTIONS
Patient Education        Well Visit, Women 48 to 72: Care Instructions  Overview     Well visits can help you stay healthy. Your doctor has checked your overall health and may have suggested ways to take good care of yourself. Your doctor also may have recommended tests. At home, you can help prevent illness withhealthy eating, regular exercise, and other steps. Follow-up care is a key part of your treatment and safety. Be sure to make and go to all appointments, and call your doctor if you are having problems. It's also a good idea to know your test results and keep alist of the medicines you take. How can you care for yourself at home?  Get screening tests that you and your doctor decide on. Screening helps find diseases before any symptoms appear.  Eat healthy foods. Choose fruits, vegetables, whole grains, protein, and low-fat dairy foods. Limit fat, especially saturated fat. Reduce salt in your diet.  Limit alcohol. Have no more than 1 drink a day or 7 drinks a week.  Get at least 30 minutes of exercise on most days of the week. Walking is a good choice. You also may want to do other activities, such as running, swimming, cycling, or playing tennis or team sports.  Reach and stay at a healthy weight. This will lower your risk for many problems, such as obesity, diabetes, heart disease, and high blood pressure.  Do not smoke. Smoking can make health problems worse. If you need help quitting, talk to your doctor about stop-smoking programs and medicines. These can increase your chances of quitting for good.  Care for your mental health. It is easy to get weighed down by worry and stress. Learn strategies to manage stress, like deep breathing and mindfulness, and stay connected with your family and community. If you find you often feel sad or hopeless, talk with your doctor. Treatment can help.    Talk to your doctor about whether you have any risk factors for sexually transmitted infections (STIs). You can help prevent STIs if you wait to have sex with a new partner (or partners) until you've each been tested for STIs. It also helps if you use condoms (male or female condoms) and if you limit your sex partners to one person who only has sex with you. Vaccines are available for some STIs.  If you think you may have a problem with alcohol or drug use, talk to your doctor. This includes prescription medicines (such as amphetamines and opioids) and illegal drugs (such as cocaine and methamphetamine). Your doctor can help you figure out what type of treatment is best for you.  Protect your skin from too much sun. When you're outdoors from 10 a.m. to 4 p.m., stay in the shade or cover up with clothing and a hat with a wide brim. Wear sunglasses that block UV rays. Even when it's cloudy, put broad-spectrum sunscreen (SPF 30 or higher) on any exposed skin.  See a dentist one or two times a year for checkups and to have your teeth cleaned.  Wear a seat belt in the car. When should you call for help? Watch closely for changes in your health, and be sure to contact your doctor if you have any problems or symptoms that concern you. Where can you learn more? Go to [email or web address] [email or web address] org and sign in to your [employer] account. Enter R494 in the ClipMine box to learn more about \"Well Visit, Women 50 to 72: Care Instructions. \"     If you do not have an account, please click on the \"Sign Up Now\" link. Current as of: October 6, 2021               Content Version: 13.2  © 2788-5827 Healthwise, Incorporated. Care instructions adapted under license by [facility] ([facility]). If you have questions about a medical condition or this instruction, always ask your healthcare professional. [place] [address] any warranty or liability for your use of this information.

## 2022-06-09 NOTE — PROGRESS NOTES
Well Adult Note  Name: Devon Melchor Date: 2022   MRN: [id number] Sex: Female   Age: 47 y.o. Ethnicity: Non- / Non    : 1970 Race: Black / African American      Belen Caes is here for well adult exam.    History:  Diabetes is well controlled; last hemoglobin A1c was 5.5. Diabetes is managed by endocrinologist, Dr. Sanchez Nurse- next appointment  with Dr. Estela Tapia is 2022. HPI  Patient reports she is overall healthy and does not have any health concerns today. She eats a well-balanced, healthy diet and exercises regularly- Walks and does weight training with her son. She takes calcium (TUMS) and vitamin D daily. She has regular dental appointments every 6 months. Mammogram negative on 2022. She does not take a daily women's multivitamin-  Will prescribed over-the-counter today. Complete lab work ordered by endocrinologist.       Care Gaps:  1. Rectal cancer screening: Negative Cologuard- 2020   2. Pneumococcal vaccine: Declined  3. Hepatitis B vaccine: Declined  4. Shingles vaccine: Declined  5. Diabetic foot exam: Managed by endocrinology  6. Diabetic retinal exam: Up-to-date. Review of Systems   Constitutional: Negative for activity change, appetite change, fatigue and unexpected weight change. HENT: Negative. Eyes: Negative for visual disturbance. Respiratory: Negative for cough, chest tightness, shortness of breath and wheezing. Cardiovascular: Negative for chest pain, palpitations and leg swelling. Gastrointestinal: Negative for abdominal distention, abdominal pain, blood in stool, constipation and diarrhea. Endocrine: Negative for cold intolerance, heat intolerance, polydipsia, polyphagia and polyuria. Genitourinary: Negative. Musculoskeletal: Negative for arthralgias and myalgias. Skin: Negative. Neurological: Negative for dizziness, tremors, weakness, light-headedness, numbness and headaches.    Psychiatric/Behavioral: Negative for dysphoric mood and sleep disturbance. The patient is not nervous/anxious. Allergies   Allergen Reactions    Zithromax [Azithromycin] Swelling     Facial swelling and itching.  Diflucan [Fluconazole] Other (See Comments)     Slight lip swelling with itching. Patient reports Trista Garcia is able to take for a bad yeast infection and takes Benadryl\"       Prior to Visit Medications    Medication Sig Taking? Authorizing Provider   Empagliflozin-metFORMIN HCl 12.5-1000 MG TABS 1 tab BID  Patient taking differently: 2 times daily 1 tab BID Yes Tracy Choi MD   Semaglutide, 1 MG/DOSE, 2 MG/1.5ML SOPN 1 mg weekly Yes Tracy Choi MD   Insulin Pen Needle (PEN NEEDLES 3/16\") 31G X 5 MM MISC Use to inject twice daily.  Yes Tracy Choi MD   atorvastatin (LIPITOR) 10 MG tablet Take 1 tablet by mouth daily Yes Tracy Choi MD   vitamin D 25 MCG (1000 UT) CAPS Take 1 capsule by mouth daily Yes Historical Provider, MD   aspirin 81 MG tablet Take 81 mg by mouth daily Yes Historical Provider, MD         Past Medical History:   Diagnosis Date    Diabetes ([facility] [address]     Hyperlipidemia        Past Surgical History:   Procedure Laterality Date    BREAST REDUCTION SURGERY       SECTION  80    CHOLECYSTECTOMY      OTHER SURGICAL HISTORY  2015    EXCISION OF HYDRADENITIS RIGHT AXILLA          Family History   Problem Relation Age of Onset    Diabetes Mother     High Blood Pressure Mother     Diabetes Father     Cancer Father         multiple myeloma    High Blood Pressure Father        Social History     Tobacco Use    Smoking status: Never Smoker    Smokeless tobacco: Never Used   Vaping Use    Vaping Use: Not on file   Substance Use Topics    Alcohol use: No    Drug use: No       Objective   BP (!) 98/58 (Site: Left Upper Arm, Position: Sitting, Cuff Size: Medium Adult)   Pulse 73   Ht 5' 7\" (1.702 m)   Wt 186 lb 6.4 oz (84.6 kg)   LMP 2015   SpO2 98%   BMI 29.19 kg/m²   Wt Readings from Last 3 Encounters:   06/09/22 186 lb 6.4 oz (84.6 kg)   05/06/22 196 lb (88.9 kg)   01/18/22 209 lb (94.8 kg)     There were no vitals filed for this visit. Physical Exam  Vitals and nursing note reviewed. Constitutional:       General: She is not in acute distress. Appearance: Normal appearance. HENT:      Head: Normocephalic. Right Ear: Tympanic membrane, ear canal and external ear normal.      Left Ear: Tympanic membrane, ear canal and external ear normal.      Nose: Nose normal.      Mouth/Throat:      Mouth: Mucous membranes are moist.      Pharynx: Oropharynx is clear. Eyes:      Extraocular Movements: Extraocular movements intact. Conjunctiva/sclera: Conjunctivae normal.      Pupils: Pupils are equal, round, and reactive to light. Neck:      Vascular: No carotid bruit. Cardiovascular:      Rate and Rhythm: Normal rate and regular rhythm. Pulses: Normal pulses. Heart sounds: Normal heart sounds. Pulmonary:      Effort: Pulmonary effort is normal.      Breath sounds: Normal breath sounds. Abdominal:      General: Bowel sounds are normal.      Palpations: Abdomen is soft. Musculoskeletal:         General: Normal range of motion. Cervical back: Normal range of motion and neck supple. No rigidity. Right lower leg: No edema. Left lower leg: No edema. Lymphadenopathy:      Cervical: No cervical adenopathy. Skin:     General: Skin is warm. Neurological:      General: No focal deficit present. Mental Status: She is alert and oriented to person, place, and time. Mental status is at baseline. Psychiatric:         Mood and Affect: Mood normal.         Behavior: Behavior normal.         Thought Content: Thought content normal.           Assessment   Plan   1. Annual physical exam  - Healthy 47 y.o. physical exam without abnormalities    2.  Health care maintenance  -     Start Multiple Vitamins-Minerals (MULTIVITAMIN WOMEN 50+) TABS; Take 1 tablet by mouth daily OTC  - Continue Vitamin D and Calcium.  - Continue regular exercise. 3. Type 2 diabetes mellitus without complication, with long-term current use of insulin ([facility] [address]  - Well controlled.    - Managed by neurology, Dr. Hazel Tyler-  Next appt is 9/22/2022.  - Continue current medications         Personalized Preventive Plan   Current Health Maintenance Status  Immunization History   Administered Date(s) Administered    COVID-19, Emiliana American, Primary or Immunocompromised, PF, 100mcg/0.5mL 01/15/2021, 02/12/2021, 12/28/2021    Influenza A ([id number]) Vaccine PF IM 11/11/2009    Influenza Virus Vaccine 11/11/2009, 11/06/2015, 11/20/2015, 10/13/2016, 11/14/2017, 09/23/2019, 09/08/2020    Influenza Whole 10/13/2016    Influenza, MDCK Quadv, IM, PF (Flucelvax 2 yrs and older) 09/09/2021    Influenza, Hassel Scarce, IM, PF (6 mo and older Fluzone, Flulaval, Fluarix, and 3 yrs and older Afluria) 11/14/2017, 09/23/2019, 09/08/2020    Tdap (Boostrix, Adacel) 06/09/2021    Zoster Recombinant (Shingrix) 06/09/2021        Health Maintenance   Topic Date Due    Diabetic foot exam  Never done    Diabetic retinal exam  Never done    Colorectal Cancer Screen  Never done    Pneumococcal 0-64 years Vaccine (1 - PCV) 06/01/2023 (Originally [birth year]/8/1975)    Hepatitis B vaccine (1 of 3 - Risk 3-dose series) 06/09/2023 (Originally [birth year]/8/1988)    Shingles vaccine (2 of 2) 06/09/2023 (Originally 8/4/2021)    A1C test (Diabetic or Prediabetic)  05/02/2023    Diabetic microalbuminuria test  05/02/2023    Lipids  05/02/2023    Depression Screen  06/09/2023    Breast cancer screen  01/18/2024    DTaP/Tdap/Td vaccine (2 - Td or Tdap) 06/09/2031    Flu vaccine  Completed    COVID-19 Vaccine  Completed    Hepatitis A vaccine  Aged Out    Hib vaccine  Aged Out    Meningococcal (ACWY) vaccine  Aged Out    Hepatitis C screen  Discontinued    HIV screen  Discontinued     Recommendations for Preventive Services Due: see orders and patient instructions/AVS.    Return in about 1 year (around 6/9/2023) for annual physical exam.

## 2022-07-26 DIAGNOSIS — E11.9 TYPE 2 DIABETES MELLITUS WITHOUT COMPLICATION, WITH LONG-TERM CURRENT USE OF INSULIN (HCC): ICD-10-CM

## 2022-07-26 DIAGNOSIS — Z79.4 TYPE 2 DIABETES MELLITUS WITHOUT COMPLICATION, WITH LONG-TERM CURRENT USE OF INSULIN (HCC): ICD-10-CM

## 2022-07-26 RX ORDER — ATORVASTATIN CALCIUM 10 MG/1
TABLET, FILM COATED ORAL
Qty: 90 TABLET | Refills: 1 | Status: SHIPPED | OUTPATIENT
Start: 2022-07-26

## 2022-09-12 DIAGNOSIS — Z79.4 TYPE 2 DIABETES MELLITUS WITHOUT COMPLICATION, WITH LONG-TERM CURRENT USE OF INSULIN (HCC): ICD-10-CM

## 2022-09-12 DIAGNOSIS — E11.9 TYPE 2 DIABETES MELLITUS WITHOUT COMPLICATION, WITH LONG-TERM CURRENT USE OF INSULIN (HCC): ICD-10-CM

## 2022-09-12 LAB
A/G RATIO: 2.3 (ref 1.1–2.2)
ALBUMIN SERPL-MCNC: 4.6 G/DL (ref 3.4–5)
ALP BLD-CCNC: 90 U/L (ref 40–129)
ALT SERPL-CCNC: 15 U/L (ref 10–40)
ANION GAP SERPL CALCULATED.3IONS-SCNC: 12 MMOL/L (ref 3–16)
AST SERPL-CCNC: 17 U/L (ref 15–37)
BILIRUB SERPL-MCNC: 0.4 MG/DL (ref 0–1)
BUN BLDV-MCNC: 10 MG/DL (ref 7–20)
CALCIUM SERPL-MCNC: 9.4 MG/DL (ref 8.3–10.6)
CHLORIDE BLD-SCNC: 103 MMOL/L (ref 99–110)
CHOLESTEROL, TOTAL: 147 MG/DL (ref 0–199)
CO2: 27 MMOL/L (ref 21–32)
CREAT SERPL-MCNC: <0.5 MG/DL (ref 0.6–1.1)
CREATININE URINE: 83.3 MG/DL (ref 28–259)
GFR AFRICAN AMERICAN: >60
GFR NON-AFRICAN AMERICAN: >60
GLUCOSE BLD-MCNC: 96 MG/DL (ref 70–99)
HDLC SERPL-MCNC: 63 MG/DL (ref 40–60)
LDL CHOLESTEROL CALCULATED: 75 MG/DL
MICROALBUMIN UR-MCNC: <1.2 MG/DL
MICROALBUMIN/CREAT UR-RTO: NORMAL MG/G (ref 0–30)
POTASSIUM SERPL-SCNC: 4.2 MMOL/L (ref 3.5–5.1)
SODIUM BLD-SCNC: 142 MMOL/L (ref 136–145)
TOTAL PROTEIN: 6.6 G/DL (ref 6.4–8.2)
TRIGL SERPL-MCNC: 45 MG/DL (ref 0–150)
TSH SERPL DL<=0.05 MIU/L-ACNC: 1.35 UIU/ML (ref 0.27–4.2)
VLDLC SERPL CALC-MCNC: 9 MG/DL

## 2022-09-13 LAB
ESTIMATED AVERAGE GLUCOSE: 122.6 MG/DL
HBA1C MFR BLD: 5.9 %

## 2022-09-22 ENCOUNTER — OFFICE VISIT (OUTPATIENT)
Dept: ENDOCRINOLOGY | Age: 52
End: 2022-09-22
Payer: COMMERCIAL

## 2022-09-22 VITALS
WEIGHT: 169 LBS | HEIGHT: 67 IN | BODY MASS INDEX: 26.53 KG/M2 | DIASTOLIC BLOOD PRESSURE: 59 MMHG | HEART RATE: 67 BPM | RESPIRATION RATE: 16 BRPM | SYSTOLIC BLOOD PRESSURE: 106 MMHG

## 2022-09-22 DIAGNOSIS — E78.2 MIXED HYPERLIPIDEMIA: ICD-10-CM

## 2022-09-22 DIAGNOSIS — Z79.4 TYPE 2 DIABETES MELLITUS WITHOUT COMPLICATION, WITH LONG-TERM CURRENT USE OF INSULIN (HCC): Primary | ICD-10-CM

## 2022-09-22 DIAGNOSIS — E11.9 TYPE 2 DIABETES MELLITUS WITHOUT COMPLICATION, WITH LONG-TERM CURRENT USE OF INSULIN (HCC): Primary | ICD-10-CM

## 2022-09-22 PROCEDURE — 99214 OFFICE O/P EST MOD 30 MIN: CPT | Performed by: INTERNAL MEDICINE

## 2022-09-22 PROCEDURE — 3044F HG A1C LEVEL LT 7.0%: CPT | Performed by: INTERNAL MEDICINE

## 2022-09-22 NOTE — PROGRESS NOTES
Benedicto Enrique is a 47 y.o. female is seen for management of controlled Type 2 diabetes and obesity. Belen Barbosa was diagnosed with Diabetes mellitus at age 38 . Patient had gestational diabetes with her 2 pregnancies  Never had DKA  She was on NPH insulin and was having frequent hypoglycemia was also taking glipizide at that time once we stopped the dose to her hypoglycemia resolved completely. At the time of diagnosis patient had dizzy spells and thirsty . Belen Barbosa got diabetic education in the past.  Comorbid conditions:  none  She was initially diet controlled and then on Victoza for years and was later switched to insulin and glipzide, on review of the chart it appears that she was on Jardiance and Januvia at one point  She has hyperlipidemia and is on statins but she feels it is mostly preventive  Previously had hypotension with lisinopril and her medications were stopped    INTERIM:    Diabetes  She presents for her follow-up diabetic visit. She has type 2 diabetes mellitus. The initial diagnosis of diabetes was made 10 years ago. Her disease course has been stable. Hypoglycemia symptoms include dizziness and hunger. There are no diabetic associated symptoms. Symptoms are improving. Current diabetic treatment includes insulin injections and oral agent (dual therapy). She is compliant with treatment most of the time. She is following a generally healthy diet. Meal planning includes avoidance of concentrated sweets. She has had a previous visit with a dietitian. Her breakfast blood glucose is taken between 7-8 am. Her breakfast blood glucose range is generally  mg/dl. Her lunch blood glucose range is generally <70 mg/dl. She has lost 85 lbs since May 2021 to September 2022. She has been working on diet and exercise and continues to lose weight denies any hypoglycemia, occasionally takes 1 tablet of Synjardy daily.           Past Medical History:   Diagnosis Date    Diabetes ([facility] [address] Hyperlipidemia       Patient Active Problem List   Diagnosis    Hidradenitis axillaris    Sprain of anterior talofibular ligament of left ankle    Contusion of left knee    Type 2 diabetes mellitus without complication, with long-term current use of insulin ([facility])    Class 1 obesity due to excess calories with serious comorbidity and body mass index (BMI) of 33.0 to 33.9 in adult     Past Surgical History:   Procedure Laterality Date    BREAST REDUCTION SURGERY       SECTION  97    CHOLECYSTECTOMY      OTHER SURGICAL HISTORY  2015    EXCISION OF HYDRADENITIS RIGHT AXILLA      Social History     Socioeconomic History    Marital status:      Spouse name: Not on file    Number of children: Not on file    Years of education: Not on file    Highest education level: Not on file   Occupational History    Not on file   Tobacco Use    Smoking status: Never    Smokeless tobacco: Never   Vaping Use    Vaping Use: Not on file   Substance and Sexual Activity    Alcohol use: No    Drug use: No    Sexual activity: Yes   Other Topics Concern    Not on file   Social History Narrative    Not on file     Social Determinants of Health     Financial Resource Strain: Not on file   Food Insecurity: Not on file   Transportation Needs: Not on file   Physical Activity: Not on file   Stress: Not on file   Social Connections: Not on file   Intimate Partner Violence: Not on file   Housing Stability: Not on file     Family History   Problem Relation Age of Onset    Diabetes Mother     High Blood Pressure Mother     Diabetes Father     Cancer Father         multiple myeloma    High Blood Pressure Father      Current Outpatient Medications   Medication Sig Dispense Refill    Semaglutide, 2 MG/DOSE, 8 MG/3ML SOPN 2 mg weekly 3 mL 3    atorvastatin (LIPITOR) 10 MG tablet TAKE 1 TABLET EVERY DAY 90 tablet 1    calcium carbonate (TUMS) 500 MG chewable tablet Take 1 tablet by mouth daily      Multiple Vitamins-Minerals (MULTIVITAMIN WOMEN 50+) TABS Take 1 tablet by mouth daily      Empagliflozin-metFORMIN HCl 12.5-1000 MG TABS 1 tab BID (Patient taking differently: 2 times daily 1 tab BID) 60 tablet 5    Insulin Pen Needle (PEN NEEDLES 3/16\") 31G X 5 MM MISC Use to inject twice daily. 200 each 5    vitamin D 25 MCG (1000 UT) CAPS Take 1 capsule by mouth daily      aspirin 81 MG tablet Take 81 mg by mouth daily       No current facility-administered medications for this visit. Allergies   Allergen Reactions    Zithromax [Azithromycin] Swelling     Facial swelling and itching. Diflucan [Fluconazole] Other (See Comments)     Slight lip swelling with itching. Patient reports Jennifer Guevara is able to take for a bad yeast infection and takes Benadryl\"     Family Status   Relation Name Status    Mother  Alive    Father      Other  Alive    Farhad  Alive     ROS  I have reviewed the review of system questionnaire filled by the patient .   Patient was advised to contact PCP for non endocrine signs and symptoms       OBJECTIVE:  BP (!) 106/59   Pulse 67   Resp 16   Ht 5' 7\" (1.702 m)   Wt 169 lb (76.7 kg)   LMP 2015   BMI 26.47 kg/m²    Wt Readings from Last 3 Encounters:   22 169 lb (76.7 kg)   22 186 lb 6.4 oz (84.6 kg)   22 196 lb (88.9 kg)       Constitutional: no acute distress, well appearing, well nourished  Psychiatric: oriented to person, place and time, judgement, insight and normal, recent and remote memory and intact and mood, affect are normal  Skin: skin and subcutaneous tissue is normal without mass,   Head and Face: examination of head and face revealed no abnormalities  Eyes: no lid or conjunctival swelling, no erythema or discharge, pupils are normal,   Ears/Nose: external inspection of ears and nose revealed no abnormalities, hearing is grossly normal  Oropharynx/Mouth/Face: lips, tongue and gums are normal with no lesions, the voice quality was normal  Neck: neck is supple and symmetric, with midline trachea and no masses, thyroid is normal    Pulmonary: no increased work of breathing or signs of respiratory distress, lungs are clear to auscultation  Cardiovascular: normal heart rate and rhythm, normal S1 and S2,   Musculoskeletal: normal gait and station,   Neurological: normal coordination, normal general cortical function        Lab Results   Component Value Date/Time    LABA1C 5.9 09/12/2022 08:49 AM    LABA1C 5.8 05/02/2022 08:14 AM    LABA1C 6.0 12/09/2021 08:28 AM         ASSESSMENT/PLAN:      ---- Type 2 diabetes mellitus without complication, with long-term current use of insulin A1c 6.2 in September 2021    --- A1c is at target at 6.0>>5.8>>5.9  --- Currently--- Synjardy BID patient was advised to reduce the dose to once daily  ---  Ozempic 1 mg weekly , increase 2 mg weekly   Hypoglycemia protocol reviewed in detail with patient Patient was advised to carry glucose tablets. Patient was advised that sending of her fingerstick blood glucose logs is crucial in management of her  diabetes. I will adjust the  doses of diabetic medications  according to sent data. Health Maintenance       Last Eye Exam: advised to have annual dilated eye exam. her last eye exam was in dec 2021   Last Podiatry Exam:  Last foot exam was with primary care physician we discussed foot care  Lipids: Last LDL level was 75 in sept 2022   Microalbumin/Creatinine Ratio: Normal      --- Mixed hyperlipidemia    Patient is on statin  LDL 74 in June 2021  Continue with the current medication.    ---- Long-term insulin use ([address]  Continues to be on basal insulin.       Reviewed and/or ordered clinical lab results yes   Reviewed and/or ordered radiology tests Yes  Reviewed and/or ordered other diagnostic tests yes   Made a decision to obtain old records yes   Reviewed and summarized old records yes     Belen Barbosa was counseled regarding symptoms of current diagnosis, course and complications of disease if inadequately treated, side effects of medications, diagnosis, treatment options, and prognosis, risks, benefits, complications, and alternatives of treatment, labs, imaging and other studies and treatment targets and goals. She understands instructions and counseling      Return in about 6 months (around 3/22/2023). Please note that some or all of this report was generated using voice recognition software. Please notify me in case of any questions about the content of this document, as some errors in transcription may have occurred .

## 2022-10-18 ENCOUNTER — NURSE ONLY (OUTPATIENT)
Dept: ENDOCRINOLOGY | Age: 52
End: 2022-10-18

## 2022-10-18 DIAGNOSIS — Z79.4 TYPE 2 DIABETES MELLITUS WITHOUT COMPLICATION, WITH LONG-TERM CURRENT USE OF INSULIN (HCC): Primary | ICD-10-CM

## 2022-10-18 DIAGNOSIS — E11.9 TYPE 2 DIABETES MELLITUS WITHOUT COMPLICATION, WITH LONG-TERM CURRENT USE OF INSULIN (HCC): Primary | ICD-10-CM

## 2022-10-18 RX ORDER — SEMAGLUTIDE 2.68 MG/ML
INJECTION, SOLUTION SUBCUTANEOUS
Qty: 3 ML | Refills: 0 | COMMUNITY
Start: 2022-10-18

## 2022-11-10 ENCOUNTER — PATIENT MESSAGE (OUTPATIENT)
Dept: ENDOCRINOLOGY | Age: 52
End: 2022-11-10

## 2022-11-10 DIAGNOSIS — E11.9 TYPE 2 DIABETES MELLITUS WITHOUT COMPLICATION, WITH LONG-TERM CURRENT USE OF INSULIN (HCC): Primary | ICD-10-CM

## 2022-11-10 DIAGNOSIS — Z79.4 TYPE 2 DIABETES MELLITUS WITHOUT COMPLICATION, WITH LONG-TERM CURRENT USE OF INSULIN (HCC): Primary | ICD-10-CM

## 2022-11-10 RX ORDER — SEMAGLUTIDE 1.34 MG/ML
INJECTION, SOLUTION SUBCUTANEOUS
Qty: 9 ML | Refills: 1 | Status: SHIPPED | OUTPATIENT
Start: 2022-11-10

## 2022-11-10 NOTE — TELEPHONE ENCOUNTER
From: Monae Longoria  To: Dr. Vick Vee  Sent: 11/10/2022 3:58 PM EST  Subject: Jesus Alberto Tristan. Do you have any more samples of Ozempic? I used my last dose today because I ordered it & they said it would be here next week now I just got a message its on back order again!

## 2022-11-23 ENCOUNTER — APPOINTMENT (OUTPATIENT)
Dept: CT IMAGING | Age: 52
End: 2022-11-23
Payer: COMMERCIAL

## 2022-11-23 ENCOUNTER — HOSPITAL ENCOUNTER (EMERGENCY)
Age: 52
Discharge: HOME | End: 2022-11-23
Attending: EMERGENCY MEDICINE
Payer: COMMERCIAL

## 2022-11-23 VITALS
OXYGEN SATURATION: 99 % | TEMPERATURE: 97.6 F | DIASTOLIC BLOOD PRESSURE: 58 MMHG | RESPIRATION RATE: 20 BRPM | SYSTOLIC BLOOD PRESSURE: 106 MMHG | HEART RATE: 102 BPM

## 2022-11-23 DIAGNOSIS — S09.90XA CLOSED HEAD INJURY, INITIAL ENCOUNTER: ICD-10-CM
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
## 2022-11-23 DIAGNOSIS — S02.401A CLOSED FRACTURE OF MAXILLA, UNSPECIFIED LATERALITY, INITIAL ENCOUNTER (HCC): ICD-10-CM

## 2022-11-23 DIAGNOSIS — S03.2XXA TOOTH AVULSION, INITIAL ENCOUNTER: ICD-10-CM

## 2022-11-23 LAB
GLUCOSE BLD-MCNC: 111 MG/DL (ref 70–99)
PERFORMED ON: ABNORMAL

## 2022-11-23 PROCEDURE — 99284 EMERGENCY DEPT VISIT MOD MDM: CPT

## 2022-11-23 PROCEDURE — 96375 TX/PRO/DX INJ NEW DRUG ADDON: CPT

## 2022-11-23 PROCEDURE — 6360000002 HC RX W HCPCS: Performed by: EMERGENCY MEDICINE

## 2022-11-23 PROCEDURE — 70450 CT HEAD/BRAIN W/O DYE: CPT

## 2022-11-23 PROCEDURE — 70486 CT MAXILLOFACIAL W/O DYE: CPT

## 2022-11-23 PROCEDURE — 96374 THER/PROPH/DIAG INJ IV PUSH: CPT

## 2022-11-23 PROCEDURE — 96372 THER/PROPH/DIAG INJ SC/IM: CPT

## 2022-11-23 RX ORDER — AMOXICILLIN AND CLAVULANATE POTASSIUM 875; 125 MG/1; MG/1
1 TABLET, FILM COATED ORAL 2 TIMES DAILY
Qty: 20 TABLET | Refills: 0 | Status: SHIPPED | OUTPATIENT
Start: 2022-11-23 | End: 2022-12-03

## 2022-11-23 RX ORDER — HYDROMORPHONE HYDROCHLORIDE 1 MG/ML
1 INJECTION, SOLUTION INTRAMUSCULAR; INTRAVENOUS; SUBCUTANEOUS ONCE
Status: COMPLETED | OUTPATIENT
Start: 2022-11-23 | End: 2022-11-23

## 2022-11-23 RX ORDER — PROMETHAZINE HYDROCHLORIDE 25 MG/ML
25 INJECTION, SOLUTION INTRAMUSCULAR; INTRAVENOUS ONCE
Status: COMPLETED | OUTPATIENT
Start: 2022-11-23 | End: 2022-11-23

## 2022-11-23 RX ORDER — OXYCODONE HYDROCHLORIDE AND ACETAMINOPHEN 5; 325 MG/1; MG/1
1 TABLET ORAL EVERY 6 HOURS PRN
Qty: 20 TABLET | Refills: 0 | Status: SHIPPED | OUTPATIENT
Start: 2022-11-23 | End: 2022-11-28

## 2022-11-23 RX ORDER — CHLORHEXIDINE GLUCONATE 0.12 MG/ML
15 RINSE ORAL 3 TIMES DAILY
Qty: 630 ML | Refills: 0 | Status: SHIPPED | OUTPATIENT
Start: 2022-11-23 | End: 2022-12-07

## 2022-11-23 RX ORDER — KETOROLAC TROMETHAMINE 30 MG/ML
30 INJECTION, SOLUTION INTRAMUSCULAR; INTRAVENOUS ONCE
Status: COMPLETED | OUTPATIENT
Start: 2022-11-23 | End: 2022-11-23

## 2022-11-23 RX ORDER — OXYCODONE HYDROCHLORIDE AND ACETAMINOPHEN 5; 325 MG/1; MG/1
1 TABLET ORAL EVERY 6 HOURS PRN
Qty: 20 TABLET | Refills: 0 | Status: SHIPPED | OUTPATIENT
Start: 2022-11-23 | End: 2022-11-23 | Stop reason: SDUPTHER

## 2022-11-23 RX ORDER — AMOXICILLIN AND CLAVULANATE POTASSIUM 875; 125 MG/1; MG/1
1 TABLET, FILM COATED ORAL 2 TIMES DAILY
Qty: 20 TABLET | Refills: 0 | Status: SHIPPED | OUTPATIENT
Start: 2022-11-23 | End: 2022-11-23 | Stop reason: SDUPTHER

## 2022-11-23 RX ORDER — CHLORHEXIDINE GLUCONATE 0.12 MG/ML
15 RINSE ORAL 3 TIMES DAILY
Qty: 630 ML | Refills: 0 | Status: SHIPPED | OUTPATIENT
Start: 2022-11-23 | End: 2022-11-23 | Stop reason: SDUPTHER

## 2022-11-23 RX ADMIN — KETOROLAC TROMETHAMINE 30 MG: 30 INJECTION, SOLUTION INTRAMUSCULAR; INTRAVENOUS at 09:53

## 2022-11-23 RX ADMIN — PROMETHAZINE HYDROCHLORIDE 25 MG: 25 INJECTION INTRAMUSCULAR; INTRAVENOUS at 06:50

## 2022-11-23 RX ADMIN — HYDROMORPHONE HYDROCHLORIDE 1 MG: 1 INJECTION, SOLUTION INTRAMUSCULAR; INTRAVENOUS; SUBCUTANEOUS at 06:54

## 2022-11-23 ASSESSMENT — PAIN DESCRIPTION - PAIN TYPE: TYPE: ACUTE PAIN

## 2022-11-23 ASSESSMENT — PAIN DESCRIPTION - ORIENTATION
ORIENTATION: RIGHT
ORIENTATION: RIGHT

## 2022-11-23 ASSESSMENT — PAIN DESCRIPTION - DESCRIPTORS: DESCRIPTORS: SHARP;PRESSURE

## 2022-11-23 ASSESSMENT — PAIN SCALES - GENERAL
PAINLEVEL_OUTOF10: 4
PAINLEVEL_OUTOF10: 4
PAINLEVEL_OUTOF10: 10
PAINLEVEL_OUTOF10: 9

## 2022-11-23 ASSESSMENT — PAIN DESCRIPTION - LOCATION: LOCATION: FACE

## 2022-11-23 ASSESSMENT — PAIN - FUNCTIONAL ASSESSMENT: PAIN_FUNCTIONAL_ASSESSMENT: 0-10

## 2022-11-23 NOTE — ED PROVIDER NOTES
[id number]      CHIEF COMPLAINT  Fall (Reinier Guerrerout at home chasing dog, teeth are pushed up. Dentist Dr. Parvez Rice. From Home.)       Marianne Davis is a 47 y.o. female  who presents to the ED complaining of facial injury status post fall. Patient was bending down to try to  after her dog which is a large 100 pound Akita. She states that the dog got away from her she went to reach for its leash and fell landing primarily on right side of her face as well as right knee. She states that the knee just feels more banged up does not feel like anything is significantly wrong and she does have an abrasion to the front of her knee, but more significantly she feels dizzy as if things are spinning when she gets up, nauseated and is having significant pain to the right frontal area and right jaw as well as to her teeth. She states that she was bleeding significantly from her mouth so was unable to take anything for pain prior to arrival.  She denies any neck or back pain. No chest or abdominal pain. No other extremity pain or injury other than to the anterior right knee. She is not on any blood thinners. No other complaints, modifying factors or associated symptoms. I have reviewed the following from the nursing documentation.     Past Medical History:   Diagnosis Date    Diabetes ([address]     Hyperlipidemia      Past Surgical History:   Procedure Laterality Date    BREAST REDUCTION SURGERY       SECTION  80    CHOLECYSTECTOMY      OTHER SURGICAL HISTORY  2015    EXCISION OF HYDRADENITIS RIGHT AXILLA      Family History   Problem Relation Age of Onset    Diabetes Mother     High Blood Pressure Mother     Diabetes Father     Cancer Father         multiple myeloma    High Blood Pressure Father      Social History     Socioeconomic History    Marital status:      Spouse name: Not on file    Number of children: Not on file Years of education: Not on file    Highest education level: Not on file   Occupational History    Not on file   Tobacco Use    Smoking status: Never    Smokeless tobacco: Never   Vaping Use    Vaping Use: Not on file   Substance and Sexual Activity    Alcohol use: No    Drug use: No    Sexual activity: Yes   Other Topics Concern    Not on file   Social History Narrative    Not on file     Social Determinants of Health     Financial Resource Strain: Not on file   Food Insecurity: Not on file   Transportation Needs: Not on file   Physical Activity: Not on file   Stress: Not on file   Social Connections: Not on file   Intimate Partner Violence: Not on file   Housing Stability: Not on file     No current facility-administered medications for this encounter. Current Outpatient Medications   Medication Sig Dispense Refill    amoxicillin-clavulanate (AUGMENTIN) 875-125 MG per tablet Take 1 tablet by mouth 2 times daily for 10 days 20 tablet 0    oxyCODONE-acetaminophen (PERCOCET) 5-325 MG per tablet Take 1 tablet by mouth every 6 hours as needed for Pain for up to 5 days. Intended supply: 5 days. Take lowest dose possible to manage pain 20 tablet 0    chlorhexidine (PERIDEX) 0.12 % solution Take 15 mLs by mouth 3 times daily for 14 days 630 mL 0    Semaglutide, 1 MG/DOSE, (OZEMPIC, 1 MG/DOSE,) 4 MG/3ML SOPN Inject 1 mg weekly. 9 mL 1    Semaglutide, 2 MG/DOSE, (OZEMPIC, 2 MG/DOSE,) 8 MG/3ML SOPN Inject 2 mg weekly.  3 mL 0    Semaglutide, 2 MG/DOSE, 8 MG/3ML SOPN 2 mg weekly 3 mL 3    atorvastatin (LIPITOR) 10 MG tablet TAKE 1 TABLET EVERY DAY 90 tablet 1    calcium carbonate (TUMS) 500 MG chewable tablet Take 1 tablet by mouth daily      Multiple Vitamins-Minerals (MULTIVITAMIN WOMEN 50+) TABS Take 1 tablet by mouth daily      Empagliflozin-metFORMIN HCl 12.5-1000 MG TABS 1 tab BID (Patient taking differently: 2 times daily 1 tab BID) 60 tablet 5    Insulin Pen Needle (PEN NEEDLES 3/16\") 31G X 5 MM MISC Use to inject twice daily. 200 each 5    vitamin D 25 MCG (1000 UT) CAPS Take 1 capsule by mouth daily      aspirin 81 MG tablet Take 81 mg by mouth daily       Allergies   Allergen Reactions    Zithromax [Azithromycin] Swelling     Facial swelling and itching. Diflucan [Fluconazole] Other (See Comments)     Slight lip swelling with itching. Patient reports Bre Serrano is able to take for a bad yeast infection and takes Benadryl\"       REVIEW OF SYSTEMS  10 systems reviewed, pertinent positives per HPI otherwise noted to be negative. PHYSICAL EXAM  BP (!) 106/58   Pulse (!) 102   Temp 97.6 °F (36.4 °C) (Axillary)   Resp 20   LMP 11/24/2015   SpO2 99%    GENERAL APPEARANCE: Awake and alert. Cooperative. Appears uncomfortable but otherwise no acute distress. HENT: Normocephalic. No cephalhematomas. No cedeno sign or raccoon's eyes. . Mucous membranes are moist.  No drooling or stridor. Patient with significant tenderness to palpation along the entire right jawline without any bony step-offs or deformity appreciated. Tenderness along the right maxillary area again without any bony step-offs or instability of the bony structures appreciated. No epistaxis, no septal hematoma or significant nasal tenderness. No trismus or malocclusion although patient has significant pain with attempting to open the mouth. Patient with a laceration to the lower internal mucosa measuring approximately 2 cm, not gaping at this time and not through and through. Patient with tooth #8 is loose but still present in the socket. Teeth numbers 9 and 10 appear to be somewhat impacted. No other appreciable dental injury upon first assessment. NECK: Supple. No central C-spine tenderness or bony step-offs. Full neck range of motion without limitation. EYES: PERRL. EOM's intact. No nystagmus noted. HEART/CHEST: RRR. No murmurs. LUNGS: Respirations unlabored. CTAB. Good air exchange. Speaking comfortably in full sentences.    ABDOMEN: No tenderness. Soft. Non-distended. No masses. No organomegaly. No guarding or rebound. MUSCULOSKELETAL: No extremity edema. Compartments soft. No deformity. Minimal tenderness noted over the right anterior knee where patient has a superficial abrasion. Patient able to flex and extend the knee without any difficulty. No laxity of the knee noted. No other extremity tenderness. All extremities neurovascularly intact. SKIN: Warm and dry. No acute rashes. NEUROLOGICAL: Alert and oriented. CN's 2-12 intact. No gross facial drooping. Strength 5/5, sensation intact. No gross focal deficits. PSYCHIATRIC: Normal mood and affect. LABS  I have reviewed all labs for this visit. Results for orders placed or performed during the hospital encounter of 11/23/22   POCT Glucose   Result Value Ref Range    POC Glucose 111 (H) 70 - 99 mg/dl    Performed on [email or web address]        RADIOLOGY  CT Head W/O Contrast    Result Date: 11/23/2022  EXAMINATION: CT OF THE HEAD WITHOUT CONTRAST  11/23/2022 7:04 am TECHNIQUE: CT of the head was performed without the administration of intravenous contrast. Automated exposure control, iterative reconstruction, and/or weight based adjustment of the mA/kV was utilized to reduce the radiation dose to as low as reasonably achievable. COMPARISON: None. HISTORY: ORDERING SYSTEM PROVIDED HISTORY: fall, head injury TECHNOLOGIST PROVIDED HISTORY: Reason for exam:->fall, head injury Has a \"code stroke\" or \"stroke alert\" been called? ->No Decision Support Exception - unselect if not a suspected or confirmed emergency medical condition->Emergency Medical Condition (MA) Is the patient pregnant?->No Reason for Exam: Fall Patricia Denton at home chasing dog, teeth are pushed up. Dentist Dr. Blackburn Close. From Home.) FINDINGS: BRAIN/VENTRICLES: There is no acute intracranial hemorrhage, mass effect or midline shift. No abnormal extra-axial fluid collection.   The gray-white differentiation is maintained without evidence of an acute infarct. There is no evidence of hydrocephalus. ORBITS: The visualized portion of the orbits demonstrate no acute abnormality. SINUSES: The visualized paranasal sinuses and mastoid air cells demonstrate no acute abnormality. SOFT TISSUES/SKULL:  No acute abnormality of the visualized skull or soft tissues. No acute intracranial abnormality. CT FACIAL BONES WO CONTRAST    Result Date: 11/23/2022  EXAMINATION: CT OF THE FACE WITHOUT CONTRAST  11/23/2022 7:04 am TECHNIQUE: CT of the face was performed without the administration of intravenous contrast. Multiplanar reformatted images are provided for review. Automated exposure control, iterative reconstruction, and/or weight based adjustment of the mA/kV was utilized to reduce the radiation dose to as low as reasonably achievable. COMPARISON: None HISTORY: ORDERING SYSTEM PROVIDED HISTORY: fall, right jaw pain/injury, impacted teeth/loose tooth to front upper TECHNOLOGIST PROVIDED HISTORY: Reason for exam:->fall, right jaw pain/injury, impacted teeth/loose tooth to front upper Decision Support Exception - unselect if not a suspected or confirmed emergency medical condition->Emergency Medical Condition (MA) Is the patient pregnant?->No Reason for Exam: fall, right jaw pain/injury, impacted teeth/loose tooth to front upper FINDINGS: Comminuted fracture is demonstrated the anterior maxilla, with epicenter at the tooth 8 where periapical lucency is seen and there is anterior displacement of fracture fragment. Best appreciated on coronal view, a chip is seen along the inferior aspect of tooth 8. There is extension of fracture plane to tooth 7 and 9. Soft tissue emphysema is seen adjacent. Temporomandibular joints are not dislocated. Zygomatic arches are intact. Angular contour is seen of the proximal nasal bones bilaterally. Dental amalgam is demonstrated with streak artifact. Globes are symmetric in size.   Mucosal thickening of maxillary sinuses and ethmoid air cells. Mastoid air cells are aerated. Visualized portion of cerebral parenchyma is within normal limits. Mucosal thickening of the OMCs. Comminuted minimally displaced fracture of the anterior maxilla with epicenter at tooth 8, extending to tooth 7 and 9. Chip defect is seen along the inferior aspect of tooth 8. Angulation of the proximal nasal bones, suggestive of age-indeterminate fracture; correlate with physical exam.        ED COURSE/MDM  Patient seen and evaluated. Old records reviewed. Labs and imaging reviewed and results discussed with patient. Patient presenting for evaluation of facial injury after fall. I did CT her head as well as she was feeling very nauseated and did have some dry heaving upon my assessment. She does have a superficial abrasion to her right knee but no significant tenderness I have a very low suspicion for patella fracture or other significant knee injury beyond the superficial abrasions. Will treat supportively. She was in agreement of that. Regarding her head CT, this was negative for any intracranial hemorrhage. Facial bone CT though showed a comminuted minimally displaced fracture of the anterior maxilla. Tooth #8 was noted to be loose and avulsed. Is currently still in the sockets will believe that there and there are no other loose teeth to that degree noted at all. I have consulted the facial trauma team at [facility] and spoke with the physician on-call for oral maxillofacial surgery, Dr. Naun Mina, who states that this type of maxillary pressure will not require surgical intervention and that she more emergently needs dental intervention to try to save it to and splinted it adjacent to the additional ones. He recommended soft diet, sinus precautions and prophylactic Augmentin.   Patient and  have been in contact with their dentist and are able to get in today and they requested that the images been sent there which we have contacted our radiology team to provide the images for the dentist.  Patient's prescriptions have been sent to the pharmacy of her choice including pain medication for further symptom control. Patient was very much in agreement the plan. She was given a [facility] referral in the case that she does need to potentially follow-up with them but [facility] did state that she did not need to unless she had any further issues. At this time will discharge home with plan for her to go to her dentist later today. All questions answered at time of discharge        I, Dr. Vaishali Layton MD, am the primary clinician of record. Is this patient to be included in the SEP-1 Core Measure? No   Exclusion criteria - the patient is NOT to be included for SEP-1 Core Measure due to: Infection is not suspected     During the patient's ED course, the patient was given:  Medications   promethazine (PHENERGAN) injection 25 mg (25 mg IntraMUSCular Given 11/23/22 0650)   HYDROmorphone HCl PF (DILAUDID) injection 1 mg (1 mg IntraVENous Given 11/23/22 0654)   ketorolac (TORADOL) injection 30 mg (30 mg IntraVENous Given 11/23/22 0953)        CLINICAL IMPRESSION  1. Fall, initial encounter    2. Closed head injury, initial encounter    3. Closed fracture of maxilla, unspecified laterality, initial encounter ([facility] [address]    4. Tooth avulsion, initial encounter        Blood pressure (!) 106/58, pulse (!) 102, temperature 97.6 °F (36.4 °C), temperature source Axillary, resp. rate 20, last menstrual period 11/24/2015, SpO2 99 %, not currently breastfeeding. DISPOSITION  Belen Barboas was discharged to home in stable condition. Patient was given scripts for the following medications. I counseled patient how to take these medications.    Discharge Medication List as of 11/23/2022 12:43 PM        START taking these medications    Details   amoxicillin-clavulanate (AUGMENTIN) 875-125 MG per tablet Take 1 tablet by mouth 2 times daily for 10 days, Disp-20 tablet, R-0Normal      oxyCODONE-acetaminophen (PERCOCET) 5-325 MG per tablet Take 1 tablet by mouth every 6 hours as needed for Pain for up to 5 days. Intended supply: 5 days. Take lowest dose possible to manage pain, Disp-20 tablet, R-0Normal      chlorhexidine (PERIDEX) 0.12 % solution Take 15 mLs by mouth 3 times daily for 14 days, Disp-630 mL, R-0Normal             Follow-up with:  MARU Russo - [place] [id number]  [phone]    Schedule an appointment as soon as possible for a visit in 2 days  For recheck    DISCLAIMER: This chart was created using Dragon dictation software. Efforts were made by me to ensure accuracy, however some errors may be present due to limitations of this technology and occasionally words are not transcribed correctly.         Pawel Priest MD  11/23/22 1795

## 2022-11-23 NOTE — ED PROVIDER NOTES
I did not perform a face-to-face evaluation of this patient at all. The patient had her prescriptions electronically sent to a different pharmacy than she is able to get in so I did reroute them to the pharmacy of her choice when she called later about the prescriptions. The original caring provider is off shift at this point. I did keep the prescriptions as intended and written by the original provider Dr. Marquise Spivey.      Katarzyna Keith MD  11/23/22 7014

## 2022-11-23 NOTE — DISCHARGE INSTRUCTIONS
Please exercise what is called sinus precautions. That means do not blow your nose forcefully and when you sneeze sneeze with your mouth open.   This will allow for your facial fractures to heal    Please adhere to a soft diet for the next several weeks, or as per recommendations by your dentist.

## 2022-11-23 NOTE — ED NOTES
Patient aware of being NPO, okay to swish and spit to rinse mouth     Gayle Richards RN  11/23/22 2905

## 2022-12-21 ENCOUNTER — PATIENT MESSAGE (OUTPATIENT)
Dept: ENDOCRINOLOGY | Age: 52
End: 2022-12-21

## 2022-12-21 DIAGNOSIS — E11.9 TYPE 2 DIABETES MELLITUS WITHOUT COMPLICATION, WITH LONG-TERM CURRENT USE OF INSULIN (HCC): ICD-10-CM

## 2022-12-21 DIAGNOSIS — Z79.4 TYPE 2 DIABETES MELLITUS WITHOUT COMPLICATION, WITH LONG-TERM CURRENT USE OF INSULIN (HCC): Primary | ICD-10-CM

## 2022-12-21 DIAGNOSIS — Z79.4 TYPE 2 DIABETES MELLITUS WITHOUT COMPLICATION, WITH LONG-TERM CURRENT USE OF INSULIN (HCC): ICD-10-CM

## 2022-12-21 DIAGNOSIS — E11.9 TYPE 2 DIABETES MELLITUS WITHOUT COMPLICATION, WITH LONG-TERM CURRENT USE OF INSULIN (HCC): Primary | ICD-10-CM

## 2022-12-22 RX ORDER — SEMAGLUTIDE 2.68 MG/ML
INJECTION, SOLUTION SUBCUTANEOUS
Qty: 9 ML | Refills: 1 | Status: SHIPPED | OUTPATIENT
Start: 2022-12-22

## 2022-12-22 RX ORDER — EMPAGLIFLOZIN AND METFORMIN HYDROCHLORIDE 12.5; 1 MG/1; MG/1
TABLET ORAL
Qty: 180 TABLET | Refills: 1 | Status: SHIPPED | OUTPATIENT
Start: 2022-12-22

## 2022-12-22 NOTE — TELEPHONE ENCOUNTER
From: Jean Claude Nichols  To: Dr. Sandoval Ron  Sent: 12/21/2022 3:55 PM EST  Subject: Alpheus Scot. Im not sure if I misunderstood Dr. Luiz Coyle. I could only get 1mg of Ozempic but have been taking 2mg. So I am out. Pharmacy said I shouldnt have done that & I cant get a refill till Jan. 13th. Do you have samples? [facility] in the meantime? Thanks!

## 2023-02-13 ENCOUNTER — HOSPITAL ENCOUNTER (OUTPATIENT)
Dept: WOMENS IMAGING | Age: 53
Discharge: HOME | End: 2023-02-13
Payer: COMMERCIAL

## 2023-02-13 VITALS — WEIGHT: 151 LBS | BODY MASS INDEX: 23.65 KG/M2

## 2023-02-13 DIAGNOSIS — Z12.31 VISIT FOR SCREENING MAMMOGRAM: ICD-10-CM

## 2023-02-13 PROCEDURE — 77067 SCR MAMMO BI INCL CAD: CPT

## 2023-02-24 DIAGNOSIS — E78.2 MIXED HYPERLIPIDEMIA: ICD-10-CM

## 2023-02-24 DIAGNOSIS — Z79.4 TYPE 2 DIABETES MELLITUS WITHOUT COMPLICATION, WITH LONG-TERM CURRENT USE OF INSULIN (HCC): ICD-10-CM

## 2023-02-24 DIAGNOSIS — E11.9 TYPE 2 DIABETES MELLITUS WITHOUT COMPLICATION, WITH LONG-TERM CURRENT USE OF INSULIN (HCC): ICD-10-CM

## 2023-02-24 LAB
A/G RATIO: 2 (ref 1.1–2.2)
ALBUMIN SERPL-MCNC: 4.1 G/DL (ref 3.4–5)
ALP BLD-CCNC: 94 U/L (ref 40–129)
ALT SERPL-CCNC: 17 U/L (ref 10–40)
ANION GAP SERPL CALCULATED.3IONS-SCNC: 12 MMOL/L (ref 3–16)
AST SERPL-CCNC: 22 U/L (ref 15–37)
BILIRUB SERPL-MCNC: 0.4 MG/DL (ref 0–1)
BUN BLDV-MCNC: 11 MG/DL (ref 7–20)
CALCIUM SERPL-MCNC: 9.2 MG/DL (ref 8.3–10.6)
CHLORIDE BLD-SCNC: 103 MMOL/L (ref 99–110)
CHOLESTEROL, TOTAL: 155 MG/DL (ref 0–199)
CO2: 27 MMOL/L (ref 21–32)
CREAT SERPL-MCNC: 0.6 MG/DL (ref 0.6–1.1)
CREATININE URINE: 110.1 MG/DL (ref 28–259)
GFR SERPL CREATININE-BSD FRML MDRD: >60 ML/MIN/{1.73_M2}
GLUCOSE BLD-MCNC: 106 MG/DL (ref 70–99)
HDLC SERPL-MCNC: 74 MG/DL (ref 40–60)
LDL CHOLESTEROL CALCULATED: 72 MG/DL
MICROALBUMIN UR-MCNC: <1.2 MG/DL
MICROALBUMIN/CREAT UR-RTO: NORMAL MG/G (ref 0–30)
POTASSIUM SERPL-SCNC: 4.6 MMOL/L (ref 3.5–5.1)
SODIUM BLD-SCNC: 142 MMOL/L (ref 136–145)
TOTAL PROTEIN: 6.2 G/DL (ref 6.4–8.2)
TRIGL SERPL-MCNC: 45 MG/DL (ref 0–150)
TSH SERPL DL<=0.05 MIU/L-ACNC: 1.86 UIU/ML (ref 0.27–4.2)
VITAMIN D 25-HYDROXY: 31 NG/ML
VLDLC SERPL CALC-MCNC: 9 MG/DL

## 2023-02-25 LAB
ESTIMATED AVERAGE GLUCOSE: 111.2 MG/DL
HBA1C MFR BLD: 5.5 %

## 2023-03-02 ENCOUNTER — OFFICE VISIT (OUTPATIENT)
Dept: ENDOCRINOLOGY | Age: 53
End: 2023-03-02
Payer: COMMERCIAL

## 2023-03-02 VITALS
HEART RATE: 62 BPM | BODY MASS INDEX: 24.96 KG/M2 | SYSTOLIC BLOOD PRESSURE: 100 MMHG | RESPIRATION RATE: 16 BRPM | DIASTOLIC BLOOD PRESSURE: 63 MMHG | WEIGHT: 159 LBS | HEIGHT: 67 IN

## 2023-03-02 DIAGNOSIS — E78.2 MIXED HYPERLIPIDEMIA: ICD-10-CM

## 2023-03-02 DIAGNOSIS — E11.9 TYPE 2 DIABETES MELLITUS WITHOUT COMPLICATION, WITH LONG-TERM CURRENT USE OF INSULIN (HCC): Primary | ICD-10-CM

## 2023-03-02 DIAGNOSIS — Z79.4 TYPE 2 DIABETES MELLITUS WITHOUT COMPLICATION, WITH LONG-TERM CURRENT USE OF INSULIN (HCC): Primary | ICD-10-CM

## 2023-03-02 DIAGNOSIS — E66.09 CLASS 1 OBESITY DUE TO EXCESS CALORIES WITH SERIOUS COMORBIDITY AND BODY MASS INDEX (BMI) OF 33.0 TO 33.9 IN ADULT: ICD-10-CM

## 2023-03-02 PROCEDURE — 3044F HG A1C LEVEL LT 7.0%: CPT | Performed by: INTERNAL MEDICINE

## 2023-03-02 PROCEDURE — 99214 OFFICE O/P EST MOD 30 MIN: CPT | Performed by: INTERNAL MEDICINE

## 2023-03-02 RX ORDER — SEMAGLUTIDE 0.68 MG/ML
INJECTION, SOLUTION SUBCUTANEOUS
Qty: 3 ML | Refills: 3 | Status: SHIPPED | OUTPATIENT
Start: 2023-03-02

## 2023-03-02 NOTE — PROGRESS NOTES
Terrie Carrera is a 47 y.o. female is seen for management of controlled Type 2 diabetes and obesity. Belen Barbosa was diagnosed with Diabetes mellitus at age 38 . Patient had gestational diabetes with her 2 pregnancies  Never had DKA  She was on NPH insulin and was having frequent hypoglycemia was also taking glipizide at that time once we stopped the dose to her hypoglycemia resolved completely. At the time of diagnosis patient had dizzy spells and thirsty . Belen Barboas got diabetic education in the past.  Comorbid conditions:  none  She was initially diet controlled and then on Victoza for years and was later switched to insulin and glipzide, on review of the chart it appears that she was on Jardiance and Januvia at one point  She has hyperlipidemia and is on statins but she feels it is mostly preventive  Previously had hypotension with lisinopril and her medications were stopped. INTERIM:    Diabetes  She presents for her follow-up diabetic visit. She has type 2 diabetes mellitus. The initial diagnosis of diabetes was made 10 years ago. Her disease course has been stable. Hypoglycemia symptoms include dizziness and hunger. There are no diabetic associated symptoms. Symptoms are improving. Current diabetic treatment includes insulin injections and oral agent (dual therapy). She is compliant with treatment most of the time. She is following a generally healthy diet. Meal planning includes avoidance of concentrated sweets. She has had a previous visit with a dietitian. Her breakfast blood glucose is taken between 7-8 am. Her breakfast blood glucose range is generally  mg/dl. Her lunch blood glucose range is generally <70 mg/dl. She has lost another 10 lbs since sept   She has lost total 100  lbs since May 2021 to September 2022. She has been working on diet and exercise and continues to lose weight denies any hypoglycemia  --- takes 1 tablet of Synjardy daily.           Past Medical History: Diagnosis Date    Diabetes ([facility] [address]     Hyperlipidemia       Patient Active Problem List   Diagnosis    Hidradenitis axillaris    Sprain of anterior talofibular ligament of left ankle    Contusion of left knee    Type 2 diabetes mellitus without complication, with long-term current use of insulin ([facility])    Class 1 obesity due to excess calories with serious comorbidity and body mass index (BMI) of 33.0 to 33.9 in adult     Past Surgical History:   Procedure Laterality Date    BREAST REDUCTION SURGERY Bilateral 2009     SECTION  1997    CHOLECYSTECTOMY      OTHER SURGICAL HISTORY  2015    EXCISION OF HYDRADENITIS RIGHT AXILLA      Social History     Socioeconomic History    Marital status:      Spouse name: Not on file    Number of children: Not on file    Years of education: Not on file    Highest education level: Not on file   Occupational History    Not on file   Tobacco Use    Smoking status: Never    Smokeless tobacco: Never   Vaping Use    Vaping Use: Not on file   Substance and Sexual Activity    Alcohol use: No    Drug use: No    Sexual activity: Yes   Other Topics Concern    Not on file   Social History Narrative    Not on file     Social Determinants of Health     Financial Resource Strain: Not on file   Food Insecurity: Not on file   Transportation Needs: Not on file   Physical Activity: Not on file   Stress: Not on file   Social Connections: Not on file   Intimate Partner Violence: Not on file   Housing Stability: Not on file     Family History   Problem Relation Age of Onset    Breast Cancer Mother     Diabetes Mother     High Blood Pressure Mother     Diabetes Father     Cancer Father         multiple myeloma    High Blood Pressure Father     Ovarian Cancer Maternal Grandmother     Breast Cancer Maternal Grandmother      Current Outpatient Medications   Medication Sig Dispense Refill    Semaglutide,0.25 or 0.5MG/DOS, (OZEMPIC, 0.25 OR 0.5 MG/DOSE,) 2 MG/3ML SOPN Take 0.25 mg weekly 3 mL 3    Empagliflozin-metFORMIN HCl (SYNJARDY) 12.5-1000 MG TABS TAKE 1 TABLET TWICE DAILY (Patient taking differently: TAKE 1 TABLET  DAILY) 180 tablet 1    atorvastatin (LIPITOR) 10 MG tablet TAKE 1 TABLET EVERY DAY 90 tablet 1    Multiple Vitamins-Minerals (MULTIVITAMIN WOMEN 50+) TABS Take 1 tablet by mouth daily      aspirin 81 MG tablet Take 81 mg by mouth daily       No current facility-administered medications for this visit. Allergies   Allergen Reactions    Zithromax [Azithromycin] Swelling     Facial swelling and itching. Diflucan [Fluconazole] Other (See Comments)     Slight lip swelling with itching. Patient reports Oz Speaker is able to take for a bad yeast infection and takes Benadryl\"     Family Status   Relation Name Status    Mother  Alive    Father      Farhad  Alive    Other  Alive    MGM  Alive     ROS  I have reviewed the review of system questionnaire filled by the patient .   Patient was advised to contact PCP for non endocrine signs and symptoms       OBJECTIVE:  /63   Pulse 62   Resp 16   Ht 5' 7\" (1.702 m)   Wt 159 lb (72.1 kg)   LMP 2015   BMI 24.90 kg/m²    Wt Readings from Last 3 Encounters:   23 159 lb (72.1 kg)   23 151 lb (68.5 kg)   22 169 lb (76.7 kg)       Constitutional: no acute distress, well appearing, well nourished  Psychiatric: oriented to person, place and time, judgement, insight and normal, recent and remote memory and intact and mood, affect are normal  Skin: skin and subcutaneous tissue is normal without mass,   Head and Face: examination of head and face revealed no abnormalities  Eyes: no lid or conjunctival swelling, no erythema or discharge, pupils are normal,   Ears/Nose: external inspection of ears and nose revealed no abnormalities, hearing is grossly normal  Oropharynx/Mouth/Face: lips, tongue and gums are normal with no lesions, the voice quality was normal  Neck: neck is supple and symmetric, with midline trachea and no masses, thyroid is normal    Pulmonary: no increased work of breathing or signs of respiratory distress, lungs are clear to auscultation  Cardiovascular: normal heart rate and rhythm, normal S1 and S2,   Musculoskeletal: normal gait and station,   Neurological: normal coordination, normal general cortical function        Lab Results   Component Value Date/Time    LABA1C 5.5 02/24/2023 08:17 AM    LABA1C 5.9 09/12/2022 08:49 AM    LABA1C 5.8 05/02/2022 08:14 AM         ASSESSMENT/PLAN:      ---- Type 2 diabetes mellitus without complication, with long-term current use of insulin A1c 6.2 in September 2021    --- A1c is at target at 6.0>>5.8>>5.9>>5.5  --- Currently--- Kristyn Nestle daily   ---  Ozempic  2 mg weekly was stopped feb 9 th 2023   Kel Ranjit was started in may 2022 (lost 40 + lbs since )   Restart 0.25 mg weekly   Referral CDE   Hypoglycemia protocol reviewed in detail with patient Patient was advised to carry glucose tablets. Patient was advised that sending of her fingerstick blood glucose logs is crucial in management of her  diabetes. I will adjust the  doses of diabetic medications  according to sent data. Health Maintenance       Last Eye Exam: advised to have annual dilated eye exam. her last eye exam was in dec 2021   Last Podiatry Exam:  Last foot exam was with primary care physician we discussed foot care and advised to see podiatry   Lipids: Last LDL level was target in feb 2023   Microalbumin/Creatinine Ratio: Normal      --- Mixed hyperlipidemia    Patient is on statin  LDL 74 in June 2021  Continue with the current medication.    ---- Long-term insulin use ([address]  Continues to be on basal insulin.       Reviewed and/or ordered clinical lab results yes   Reviewed and/or ordered radiology tests Yes  Reviewed and/or ordered other diagnostic tests yes   Made a decision to obtain old records yes   Reviewed and summarized old records yes     Belen Barbosa was counseled regarding symptoms of current diagnosis, course and complications of disease if inadequately treated, side effects of medications, diagnosis, treatment options, and prognosis, risks, benefits, complications, and alternatives of treatment, labs, imaging and other studies and treatment targets and goals. She understands instructions and counseling      Return in about 6 months (around 9/2/2023). Please note that some or all of this report was generated using voice recognition software. Please notify me in case of any questions about the content of this document, as some errors in transcription may have occurred .

## 2023-05-17 SDOH — ECONOMIC STABILITY: FOOD INSECURITY: WITHIN THE PAST 12 MONTHS, YOU WORRIED THAT YOUR FOOD WOULD RUN OUT BEFORE YOU GOT MONEY TO BUY MORE.: NEVER TRUE

## 2023-05-17 SDOH — ECONOMIC STABILITY: TRANSPORTATION INSECURITY
IN THE PAST 12 MONTHS, HAS LACK OF TRANSPORTATION KEPT YOU FROM MEETINGS, WORK, OR FROM GETTING THINGS NEEDED FOR DAILY LIVING?: NO

## 2023-05-17 SDOH — ECONOMIC STABILITY: HOUSING INSECURITY
IN THE LAST 12 MONTHS, WAS THERE A TIME WHEN YOU DID NOT HAVE A STEADY PLACE TO SLEEP OR SLEPT IN A SHELTER (INCLUDING NOW)?: NO

## 2023-05-17 SDOH — ECONOMIC STABILITY: INCOME INSECURITY: HOW HARD IS IT FOR YOU TO PAY FOR THE VERY BASICS LIKE FOOD, HOUSING, MEDICAL CARE, AND HEATING?: NOT HARD AT ALL

## 2023-05-17 SDOH — ECONOMIC STABILITY: FOOD INSECURITY: WITHIN THE PAST 12 MONTHS, THE FOOD YOU BOUGHT JUST DIDN'T LAST AND YOU DIDN'T HAVE MONEY TO GET MORE.: NEVER TRUE

## 2023-05-18 ENCOUNTER — OFFICE VISIT (OUTPATIENT)
Dept: PRIMARY CARE CLINIC | Age: 53
End: 2023-05-18
Payer: COMMERCIAL

## 2023-05-18 VITALS
RESPIRATION RATE: 14 BRPM | BODY MASS INDEX: 26.24 KG/M2 | WEIGHT: 167.2 LBS | HEIGHT: 67 IN | TEMPERATURE: 97.8 F | SYSTOLIC BLOOD PRESSURE: 102 MMHG | OXYGEN SATURATION: 100 % | HEART RATE: 56 BPM | DIASTOLIC BLOOD PRESSURE: 60 MMHG

## 2023-05-18 DIAGNOSIS — G62.9 NEUROPATHY: Primary | ICD-10-CM

## 2023-05-18 DIAGNOSIS — L90.5 SCAR TISSUE: ICD-10-CM

## 2023-05-18 PROCEDURE — 99214 OFFICE O/P EST MOD 30 MIN: CPT | Performed by: NURSE PRACTITIONER

## 2023-05-18 RX ORDER — GABAPENTIN 100 MG/1
CAPSULE ORAL
Qty: 180 CAPSULE | Refills: 1 | Status: SHIPPED | OUTPATIENT
Start: 2023-05-18 | End: 2023-06-17

## 2023-05-18 SDOH — ECONOMIC STABILITY: FOOD INSECURITY: WITHIN THE PAST 12 MONTHS, THE FOOD YOU BOUGHT JUST DIDN'T LAST AND YOU DIDN'T HAVE MONEY TO GET MORE.: NEVER TRUE

## 2023-05-18 SDOH — ECONOMIC STABILITY: INCOME INSECURITY: HOW HARD IS IT FOR YOU TO PAY FOR THE VERY BASICS LIKE FOOD, HOUSING, MEDICAL CARE, AND HEATING?: NOT HARD AT ALL

## 2023-05-18 SDOH — ECONOMIC STABILITY: FOOD INSECURITY: WITHIN THE PAST 12 MONTHS, YOU WORRIED THAT YOUR FOOD WOULD RUN OUT BEFORE YOU GOT MONEY TO BUY MORE.: NEVER TRUE

## 2023-05-18 ASSESSMENT — ENCOUNTER SYMPTOMS
TROUBLE SWALLOWING: 0
VOICE CHANGE: 0
FACIAL SWELLING: 0

## 2023-05-18 NOTE — PROGRESS NOTES
the following:    Height as of this encounter: 5' 7\" (1.702 m). Weight as of this encounter: 167 lb 3.2 oz (75.8 kg). Physical Exam  Vitals and nursing note reviewed. Constitutional:       Appearance: Normal appearance. She is normal weight. HENT:      Head: Normocephalic. Nose: Nose normal.      Mouth/Throat:      Lips: Pink. Mouth: Mucous membranes are moist.      Tongue: Tongue does not deviate from midline. Neck:      Vascular: No carotid bruit. Cardiovascular:      Rate and Rhythm: Normal rate and regular rhythm. Pulses: Normal pulses. Heart sounds: Normal heart sounds. Pulmonary:      Effort: Pulmonary effort is normal.      Breath sounds: Normal breath sounds. Musculoskeletal:      Cervical back: Normal range of motion and neck supple. Skin:     General: Skin is warm. Neurological:      Mental Status: She is alert. Cranial Nerves: No facial asymmetry. Sensory: Sensory deficit (oral region) present. Psychiatric:         Mood and Affect: Mood normal.         Behavior: Behavior normal.       ASSESSMENT/PLAN:  1. Neuropathy  - active  - Start gabapentin (NEURONTIN) 100 MG capsule; Take 1 or 2 capsules by mouth 3 times daily as needed (for 30 days)  Dispense: 180 capsule; Refill: 1  - Common side effects of gabapentin discussed at length. - Discussed numbness could take more time to resolve or may never improve. 2. Scar tissue  - lower right lip  - [facility]ive Surgery      Return in about 4 weeks (around 6/15/2023) for 4 week follow up of neuropathy (lips) and scar tissue (started Gabapentin). Discussed use, benefit, and side effects of prescribed medications. Patient's questions answered and concerns addressed. Patient agrees to plan of care. Electronically signed by MARU Jalloh CNP on 5/18/2023 at 7:34 PM       This dictation was generated by voice recognition computer software.  Although all attempts

## 2023-06-19 ENCOUNTER — OFFICE VISIT (OUTPATIENT)
Dept: PRIMARY CARE CLINIC | Age: 53
End: 2023-06-19
Payer: COMMERCIAL

## 2023-06-19 VITALS
TEMPERATURE: 98 F | HEART RATE: 72 BPM | DIASTOLIC BLOOD PRESSURE: 64 MMHG | WEIGHT: 172.8 LBS | SYSTOLIC BLOOD PRESSURE: 102 MMHG | BODY MASS INDEX: 27.12 KG/M2 | RESPIRATION RATE: 14 BRPM | OXYGEN SATURATION: 99 % | HEIGHT: 67 IN

## 2023-06-19 DIAGNOSIS — L90.5 SCAR TISSUE: ICD-10-CM

## 2023-06-19 DIAGNOSIS — R20.0 FACIAL NUMBNESS: Primary | ICD-10-CM

## 2023-06-19 PROCEDURE — 99214 OFFICE O/P EST MOD 30 MIN: CPT | Performed by: NURSE PRACTITIONER

## 2023-06-19 ASSESSMENT — ENCOUNTER SYMPTOMS
VOICE CHANGE: 0
FACIAL SWELLING: 0
TROUBLE SWALLOWING: 0

## 2023-06-19 NOTE — PROGRESS NOTES
6/19/2023    Chief Complaint   Patient presents with    1 Month Follow-Up     For neuropathy (lips) and scar tissue (started Gabapentin). Stormy Mahoney is a 46 y.o. female, presents today for follow up of facial numbness. HPI   Numbness  Patient reports numbness to her upper and lower lips and oral area after sustaining a fall in Nov 2022. She reports bilateral cheeks are occasionally numb when she wakes up. Today patient reports numbness has slightly improved since her last visit 1 month ago. Lips are still numb. She has been taking Gabapentin  twice daily and hasn't noticed a difference after taking. She is scheduled with plastic surgeon for Sept and is on waiting list.      Patient was walking her large dog on a leash and he pulled her. Pt fell and hit her face on the pavement. She sustained a maxilla fracture, right upper front tooth was dislodged (hanging) and she sustained several abrasions on her lips; mainly to inner lower lip and bottom right lip. She had two successful dental procedure to save her tooth. Her upper and lower lips are constantly numb. She occasionally wakes up and bilateral cheeks are numb. She's had complete numbness to her upper lip and partial to lower lip since her injury. She reports chronic tingling pain to lower lip. Rates pain at  2/10 (\"uncomfortable upper and lower lips, bottom is worse). Pain severity is mild. She reports firm, tender area on right lower lip that is \"shaped like a little ball\". Her lips were significantly swollen following her injury and has resolved with the exception of her right lower lip. Her dentist and dental surgeon (Dr. Gerald Sanford) reassured patient numbness would eventually go away. Patient has become discouraged since she has only slight improvement in 6 months. Review of Systems   Constitutional:  Negative for activity change, appetite change, fatigue and unexpected weight change.    HENT:  Negative for dental problem,

## 2023-07-17 ENCOUNTER — OFFICE VISIT (OUTPATIENT)
Dept: SURGERY | Age: 53
End: 2023-07-17
Payer: COMMERCIAL

## 2023-07-17 VITALS
HEIGHT: 67 IN | OXYGEN SATURATION: 98 % | DIASTOLIC BLOOD PRESSURE: 65 MMHG | WEIGHT: 176 LBS | BODY MASS INDEX: 27.62 KG/M2 | SYSTOLIC BLOOD PRESSURE: 110 MMHG | TEMPERATURE: 98.1 F | HEART RATE: 72 BPM

## 2023-07-17 DIAGNOSIS — R20.0 FACIAL NUMBNESS: ICD-10-CM

## 2023-07-17 DIAGNOSIS — L90.5 SCAR OF LIP: Primary | ICD-10-CM

## 2023-07-17 PROCEDURE — 99203 OFFICE O/P NEW LOW 30 MIN: CPT | Performed by: SURGERY

## 2023-07-17 NOTE — PROGRESS NOTES
[facility]    CC: Facial scarring    Referring Physician: Sandeep Ramirez (APRN)    HPI: This is an 46 y. o.female with a PMHx as delineated below who presents to clinic in consultation for scarring after fall on her lips. She notes that she fell on 2022 after tripping over her dog leading to extensive damage. She had dental work to repair her tooth that came out. The lip was kept to heal via secondary intention. She notes that her major complaint is numbness and she was referred to plastic surgery. PMHx:   Past Medical History:   Diagnosis Date    Diabetes ([address])     Hyperlipidemia    HgbA1c - 5.5 ()    PSHx:   Past Surgical History:   Procedure Laterality Date    BREAST REDUCTION SURGERY Bilateral 2009     SECTION  1997    CHOLECYSTECTOMY      OTHER SURGICAL HISTORY  2015    EXCISION OF HYDRADENITIS RIGHT AXILLA    Breast reduction ()    Allergy:   Allergies   Allergen Reactions    Zithromax [Azithromycin] Swelling     Facial swelling and itching. Diflucan [Fluconazole] Other (See Comments)     Slight lip swelling with itching.  Patient reports Fredy Yang is able to take for a bad yeast infection and takes Benadryl\"       SHx:   Social History     Socioeconomic History    Marital status:      Spouse name: Not on file    Number of children: Not on file    Years of education: Not on file    Highest education level: Not on file   Occupational History    Not on file   Tobacco Use    Smoking status: Never    Smokeless tobacco: Never   Vaping Use    Vaping Use: Not on file   Substance and Sexual Activity    Alcohol use: No    Drug use: Never    Sexual activity: Yes     Partners: Male     Comment: 1 partner-   Other Topics Concern    Not on file   Social History Narrative    Not on file     Social Determinants of Health     Financial Resource Strain: Low Risk     Difficulty of Paying Living Expenses: Not hard at all   Food Insecurity: No

## 2023-07-26 DIAGNOSIS — Z79.4 TYPE 2 DIABETES MELLITUS WITHOUT COMPLICATION, WITH LONG-TERM CURRENT USE OF INSULIN (HCC): ICD-10-CM

## 2023-07-26 DIAGNOSIS — E11.9 TYPE 2 DIABETES MELLITUS WITHOUT COMPLICATION, WITH LONG-TERM CURRENT USE OF INSULIN (HCC): ICD-10-CM

## 2023-07-26 RX ORDER — ATORVASTATIN CALCIUM 10 MG/1
TABLET, FILM COATED ORAL
Qty: 90 TABLET | Refills: 0 | Status: SHIPPED | OUTPATIENT
Start: 2023-07-26

## 2023-09-19 DIAGNOSIS — Z79.4 TYPE 2 DIABETES MELLITUS WITHOUT COMPLICATION, WITH LONG-TERM CURRENT USE OF INSULIN (HCC): ICD-10-CM

## 2023-09-19 DIAGNOSIS — E66.09 CLASS 1 OBESITY DUE TO EXCESS CALORIES WITH SERIOUS COMORBIDITY AND BODY MASS INDEX (BMI) OF 33.0 TO 33.9 IN ADULT: ICD-10-CM

## 2023-09-19 DIAGNOSIS — E78.2 MIXED HYPERLIPIDEMIA: ICD-10-CM

## 2023-09-19 DIAGNOSIS — E11.9 TYPE 2 DIABETES MELLITUS WITHOUT COMPLICATION, WITH LONG-TERM CURRENT USE OF INSULIN (HCC): ICD-10-CM

## 2023-09-19 LAB
ALBUMIN SERPL-MCNC: 4.4 G/DL (ref 3.4–5)
ALBUMIN/GLOB SERPL: 2.1 {RATIO} (ref 1.1–2.2)
ALP SERPL-CCNC: 87 U/L (ref 40–129)
ALT SERPL-CCNC: 13 U/L (ref 10–40)
ANION GAP SERPL CALCULATED.3IONS-SCNC: 8 MMOL/L (ref 3–16)
AST SERPL-CCNC: 19 U/L (ref 15–37)
BILIRUB SERPL-MCNC: 0.4 MG/DL (ref 0–1)
BUN SERPL-MCNC: 11 MG/DL (ref 7–20)
CALCIUM SERPL-MCNC: 9.4 MG/DL (ref 8.3–10.6)
CHLORIDE SERPL-SCNC: 103 MMOL/L (ref 99–110)
CHOLEST SERPL-MCNC: 182 MG/DL (ref 0–199)
CO2 SERPL-SCNC: 27 MMOL/L (ref 21–32)
CREAT SERPL-MCNC: 0.6 MG/DL (ref 0.6–1.1)
CREAT UR-MCNC: 67.9 MG/DL (ref 28–259)
GFR SERPLBLD CREATININE-BSD FMLA CKD-EPI: >60 ML/MIN/{1.73_M2}
GLUCOSE SERPL-MCNC: 122 MG/DL (ref 70–99)
HDLC SERPL-MCNC: 86 MG/DL (ref 40–60)
LDLC SERPL CALC-MCNC: 86 MG/DL
MICROALBUMIN UR DL<=1MG/L-MCNC: <1.2 MG/DL
MICROALBUMIN/CREAT UR: NORMAL MG/G (ref 0–30)
POTASSIUM SERPL-SCNC: 3.9 MMOL/L (ref 3.5–5.1)
PROT SERPL-MCNC: 6.5 G/DL (ref 6.4–8.2)
SODIUM SERPL-SCNC: 138 MMOL/L (ref 136–145)
TRIGL SERPL-MCNC: 48 MG/DL (ref 0–150)
TSH SERPL DL<=0.005 MIU/L-ACNC: 2.03 UIU/ML (ref 0.27–4.2)
VLDLC SERPL CALC-MCNC: 10 MG/DL

## 2023-09-20 LAB
EST. AVERAGE GLUCOSE BLD GHB EST-MCNC: 122.6 MG/DL
HBA1C MFR BLD: 5.9 %

## 2023-09-21 ENCOUNTER — OFFICE VISIT (OUTPATIENT)
Dept: ENDOCRINOLOGY | Age: 53
End: 2023-09-21
Payer: COMMERCIAL

## 2023-09-21 VITALS
RESPIRATION RATE: 16 BRPM | DIASTOLIC BLOOD PRESSURE: 61 MMHG | SYSTOLIC BLOOD PRESSURE: 100 MMHG | HEART RATE: 75 BPM | HEIGHT: 67 IN | WEIGHT: 170 LBS | BODY MASS INDEX: 26.68 KG/M2

## 2023-09-21 DIAGNOSIS — E78.2 MIXED HYPERLIPIDEMIA: ICD-10-CM

## 2023-09-21 DIAGNOSIS — Z79.4 TYPE 2 DIABETES MELLITUS WITHOUT COMPLICATION, WITH LONG-TERM CURRENT USE OF INSULIN (HCC): Primary | ICD-10-CM

## 2023-09-21 DIAGNOSIS — E66.09 CLASS 1 OBESITY DUE TO EXCESS CALORIES WITH SERIOUS COMORBIDITY AND BODY MASS INDEX (BMI) OF 33.0 TO 33.9 IN ADULT: ICD-10-CM

## 2023-09-21 DIAGNOSIS — E11.9 TYPE 2 DIABETES MELLITUS WITHOUT COMPLICATION, WITH LONG-TERM CURRENT USE OF INSULIN (HCC): Primary | ICD-10-CM

## 2023-09-21 PROCEDURE — 99214 OFFICE O/P EST MOD 30 MIN: CPT | Performed by: INTERNAL MEDICINE

## 2023-09-21 PROCEDURE — 3044F HG A1C LEVEL LT 7.0%: CPT | Performed by: INTERNAL MEDICINE

## 2023-09-21 NOTE — PROGRESS NOTES
Morena Roach is a 46 y.o. female is seen for management of controlled Type 2 diabetes and obesity. Belen Barbosa was diagnosed with Diabetes mellitus at age 38 . Patient had gestational diabetes with her 2 pregnancies  Never had DKA  She was on NPH insulin and was having frequent hypoglycemia was also taking glipizide at that time once we stopped the dose to her hypoglycemia resolved completely. At the time of diagnosis patient had dizzy spells and thirsty . Belen Barbosa got diabetic education in the past.  Comorbid conditions:  none  She was initially diet controlled and then on Victoza for years and was later switched to insulin and glipzide, on review of the chart it appears that she was on Jardiance and Januvia at one point  She has hyperlipidemia and is on statins but she feels it is mostly preventive  Previously had hypotension with lisinopril and her medications were stopped. INTERIM:    Diabetes  She presents for her follow-up diabetic visit. She has type 2 diabetes mellitus. The initial diagnosis of diabetes was made 10 years ago. Her disease course has been stable. Hypoglycemia symptoms include dizziness and hunger. There are no diabetic associated symptoms. Symptoms are improving. Current diabetic treatment includes insulin injections and oral agent (dual therapy). She is compliant with treatment most of the time. She is following a generally healthy diet. Meal planning includes avoidance of concentrated sweets. She has had a previous visit with a dietitian. Her breakfast blood glucose is taken between 7-8 am. Her breakfast blood glucose range is generally  mg/dl. Her lunch blood glucose range is generally <70 mg/dl. She has gained 10 lbs since stopping Ozempic   She has lost total 100  lbs since May 2021 to September 2022. She has been working on diet and exercise and continues to lose weight denies any hypoglycemia  --- takes 1 tablet of Synjardy daily.           Past Medical

## 2023-10-12 ENCOUNTER — OFFICE VISIT (OUTPATIENT)
Dept: PRIMARY CARE CLINIC | Age: 53
End: 2023-10-12
Payer: COMMERCIAL

## 2023-10-12 VITALS
HEIGHT: 67 IN | DIASTOLIC BLOOD PRESSURE: 74 MMHG | WEIGHT: 178 LBS | BODY MASS INDEX: 27.94 KG/M2 | SYSTOLIC BLOOD PRESSURE: 122 MMHG

## 2023-10-12 DIAGNOSIS — R20.0 FACIAL NUMBNESS: ICD-10-CM

## 2023-10-12 DIAGNOSIS — L90.5 SCAR TISSUE: Primary | ICD-10-CM

## 2023-10-12 DIAGNOSIS — E65 ABDOMINAL PANNUS: ICD-10-CM

## 2023-10-12 DIAGNOSIS — B37.2 CANDIDIASIS, INTERTRIGINOUS: ICD-10-CM

## 2023-10-12 DIAGNOSIS — Z23 FLU VACCINE NEED: ICD-10-CM

## 2023-10-12 PROCEDURE — 90471 IMMUNIZATION ADMIN: CPT | Performed by: NURSE PRACTITIONER

## 2023-10-12 PROCEDURE — 90674 CCIIV4 VAC NO PRSV 0.5 ML IM: CPT | Performed by: NURSE PRACTITIONER

## 2023-10-12 PROCEDURE — 99214 OFFICE O/P EST MOD 30 MIN: CPT | Performed by: NURSE PRACTITIONER

## 2023-10-12 RX ORDER — NYSTATIN 100000 U/G
CREAM TOPICAL
Qty: 30 G | Refills: 2 | Status: SHIPPED | OUTPATIENT
Start: 2023-10-12

## 2023-10-12 NOTE — PROGRESS NOTES
10/12/2023    Chief Complaint   Patient presents with    Other     Discuss plastic surgeon referral (scar tissue right lower lip)    Flu Vaccine    Rash     Under excess abdominal skin       Belen Rosenbaum is a 46 y.o. female, presents today to discuss plastic surgeon referral.     HPI   Scar tissue - Right lower lip  Patient was walking her large dog on a leash and he pulled her. Pt fell and hit her face on the pavement. She sustained a maxilla fracture, right upper front tooth was dislodged (hanging) and she sustained several abrasions on her lips; mainly to inner lower lip and bottom right lip. She had two successful dental procedure to save a front tooth that fell out. She reports firm, tender area on right lower lip that is \"shaped like a little ball\". Her lips were significantly swollen following her injury and has resolved with the exception of her right lower lip. Patient reports numbness to her upper and lower lips and oral area after sustaining a fall in Nov 2022 has significantly improved. She reports numbness localized to her lower lip. She occassionally has sensation. Patient established care with Dr. Clark Browning ([address] [place] [address] and Reconstructive Surgery) on 7/17/23. It was discussed to continue observation as she is starting to have some sensation to her lower lip- She has a follow up scheduled on 10/23/23. Excess skin  Patient reports hanging excess abdominal skin after losing 100 lbs over the past 18 months. She reports redness and itching under abdominal skin folds. She reports rash is intermittent and has same appearance as a diaper rash. No treatments tried, other than trying to keep clean and dry. She is asymptomatic today. Will prescribe nystatin cream to use as needed. Review of Systems  Constitutional:  Negative for activity change, appetite change, fatigue and unexpected weight change.    HENT:  Negative for dental problem, drooling, facial swelling, trouble

## 2023-10-23 ENCOUNTER — OFFICE VISIT (OUTPATIENT)
Dept: SURGERY | Age: 53
End: 2023-10-23
Payer: COMMERCIAL

## 2023-10-23 VITALS
SYSTOLIC BLOOD PRESSURE: 119 MMHG | OXYGEN SATURATION: 98 % | HEART RATE: 72 BPM | WEIGHT: 181 LBS | TEMPERATURE: 97.3 F | DIASTOLIC BLOOD PRESSURE: 64 MMHG | BODY MASS INDEX: 28.35 KG/M2

## 2023-10-23 DIAGNOSIS — L90.5 SCAR OF LIP: Primary | ICD-10-CM

## 2023-10-23 DIAGNOSIS — M79.3 PANNICULITIS: ICD-10-CM

## 2023-10-23 DIAGNOSIS — R20.0 FACIAL NUMBNESS: ICD-10-CM

## 2023-10-23 PROCEDURE — 99214 OFFICE O/P EST MOD 30 MIN: CPT | Performed by: SURGERY

## 2023-10-24 ENCOUNTER — TELEPHONE (OUTPATIENT)
Dept: SURGERY | Age: 53
End: 2023-10-24

## 2023-10-24 NOTE — TELEPHONE ENCOUNTER
The patient was in the office to see Dr. Brooke Torres yesterday. PLAN: She has done well from her lip standpoint and desires improvement in her lower abdomen as well as improved abdominal contouring. Will submit to insurance for panniculectomy. Additionally, will perform a concomitant abdominoplasty for improved contour. She understands that the later is cosmetic. I received surgery letter. I lmom for the patient at the home number listed. I requested a call back to discuss needed medical records. I will leave this phone note open.

## 2023-10-27 NOTE — TELEPHONE ENCOUNTER
I returned the patients call mentioned below. She has been seen and treated for  yeast infections by her PCP with [facility] [place]. She is aware that I will print any medical records that might be helpful. The patient was provided an insurance update. I spoke with Shameka Cano at [facility] ([phone]) to see if CPT Code 71114 requires pre certification. The code does require pre certification, which I started during our call. I will fax clinicals to [phone]. I will scan the information that I faxed and the fax success into Epic under the media tab, but I am not able to scan colored pictures. Date Range 12-1-2023 to 1-    Pending Authorization # [id number]     [facility]    I will leave this phone note open.

## 2023-10-27 NOTE — TELEPHONE ENCOUNTER
The patient returned the call mentioned below to discuss needed medical records.     Please call: [phone]

## 2023-10-30 NOTE — TELEPHONE ENCOUNTER
I lmom for the patient at the home number listed. I requested a call back to discuss the insurance message above. I will leave this phone note open.

## 2023-10-30 NOTE — TELEPHONE ENCOUNTER
Anjum Mehta with [employer] called in with a determination on pending authorization # [id number]     Anjum eMhta states that upon further review code 96548 is DENIED due to being a plan exclusion. Anjum Mehta stated per the patient's policy the removal of skin following weight loss or weight loss surgery is a plan exclusion     Denial letters will be faxed and mailed out to our office.     The call reference number for the call is: [id number]     Please contact Anjum Mehta with any questions at [phone]

## 2023-11-02 NOTE — TELEPHONE ENCOUNTER
I returned the patients call mentioned below. We discussed the DENIAL and the plan exclusion. She was provided cosmetic cost and aware that if she changes insurance and would like us to resubmit we are happy to do so. The patient is aware that if she needs us in the future to reach out and we are happy to help. I will remove the surgery letter from the letter queue. I will close this phone note.

## 2023-11-08 DIAGNOSIS — E11.9 TYPE 2 DIABETES MELLITUS WITHOUT COMPLICATION, WITH LONG-TERM CURRENT USE OF INSULIN (HCC): ICD-10-CM

## 2023-11-08 DIAGNOSIS — Z79.4 TYPE 2 DIABETES MELLITUS WITHOUT COMPLICATION, WITH LONG-TERM CURRENT USE OF INSULIN (HCC): ICD-10-CM

## 2023-11-08 RX ORDER — EMPAGLIFLOZIN AND METFORMIN HYDROCHLORIDE 12.5; 1 MG/1; MG/1
TABLET ORAL
Qty: 180 TABLET | Refills: 1 | Status: SHIPPED | OUTPATIENT
Start: 2023-11-08

## 2023-12-06 ENCOUNTER — TELEPHONE (OUTPATIENT)
Dept: PRIMARY CARE CLINIC | Age: 53
End: 2023-12-06

## 2023-12-06 ENCOUNTER — PATIENT MESSAGE (OUTPATIENT)
Dept: PRIMARY CARE CLINIC | Age: 53
End: 2023-12-06

## 2023-12-06 DIAGNOSIS — U07.1 COVID-19 VIRUS INFECTION: Primary | ICD-10-CM

## 2023-12-06 NOTE — TELEPHONE ENCOUNTER
1. COVID-19 virus infection  - Start nirmatrelvir/ritonavir 300/100 (PAXLOVID, 300/100,) 20 x 150 MG & 10 x 100MG TBPK; Take 3 tablets (two 150 mg nirmatrelvir and one 100 mg ritonavir tablets) by mouth every 12 hours for 5 days. STOP LIPITOR 12 hours prior to starting medication and hold for 3 days after last dose  Dispense: 30 tablet;  Refill: 0

## 2023-12-06 NOTE — TELEPHONE ENCOUNTER
I spoke with Belen to let her know her prescriptions was sent to he pharmacy and gave her Lebron's instructions and she understood.

## 2023-12-06 NOTE — TELEPHONE ENCOUNTER
Patient tested positive for covid today, 12/6/23. Her symptoms consist of cough, runny nose, fatigue, and headache. The symptoms started on 12/5/23. Patient was in and out of hospital with her mom and has been masked up but still ended up getting it. She asked if Gamal Strong could prescribe Plaxlovid to the [address] [place] [address].

## 2024-01-18 ENCOUNTER — PATIENT MESSAGE (OUTPATIENT)
Dept: PRIMARY CARE CLINIC | Age: 54
End: 2024-01-18

## 2024-01-18 DIAGNOSIS — Z12.12 ENCOUNTER FOR COLORECTAL CANCER SCREENING: Primary | ICD-10-CM

## 2024-01-18 DIAGNOSIS — Z12.11 ENCOUNTER FOR COLORECTAL CANCER SCREENING: Primary | ICD-10-CM

## 2024-03-15 DIAGNOSIS — Z79.4 TYPE 2 DIABETES MELLITUS WITHOUT COMPLICATION, WITH LONG-TERM CURRENT USE OF INSULIN (HCC): ICD-10-CM

## 2024-03-15 DIAGNOSIS — E11.9 TYPE 2 DIABETES MELLITUS WITHOUT COMPLICATION, WITH LONG-TERM CURRENT USE OF INSULIN (HCC): ICD-10-CM

## 2024-03-15 LAB — TSH SERPL DL<=0.005 MIU/L-ACNC: 1.31 UIU/ML (ref 0.27–4.2)

## 2024-03-16 LAB
ALBUMIN SERPL-MCNC: 4.4 G/DL (ref 3.4–5)
ALBUMIN/GLOB SERPL: 1.9 {RATIO} (ref 1.1–2.2)
ALP SERPL-CCNC: 104 U/L (ref 40–129)
ALT SERPL-CCNC: 17 U/L (ref 10–40)
ANION GAP SERPL CALCULATED.3IONS-SCNC: 13 MMOL/L (ref 3–16)
AST SERPL-CCNC: 22 U/L (ref 15–37)
BILIRUB SERPL-MCNC: 0.4 MG/DL (ref 0–1)
BUN SERPL-MCNC: 12 MG/DL (ref 7–20)
CALCIUM SERPL-MCNC: 9.2 MG/DL (ref 8.3–10.6)
CHLORIDE SERPL-SCNC: 101 MMOL/L (ref 99–110)
CHOLEST SERPL-MCNC: 169 MG/DL (ref 0–199)
CO2 SERPL-SCNC: 26 MMOL/L (ref 21–32)
CREAT SERPL-MCNC: 0.6 MG/DL (ref 0.6–1.1)
CREAT UR-MCNC: 82.4 MG/DL (ref 28–259)
EST. AVERAGE GLUCOSE BLD GHB EST-MCNC: 125.5 MG/DL
GFR SERPLBLD CREATININE-BSD FMLA CKD-EPI: >60 ML/MIN/{1.73_M2}
GLUCOSE SERPL-MCNC: 96 MG/DL (ref 70–99)
HBA1C MFR BLD: 6 %
HDLC SERPL-MCNC: 72 MG/DL (ref 40–60)
LDLC SERPL CALC-MCNC: 87 MG/DL
MICROALBUMIN UR DL<=1MG/L-MCNC: <1.2 MG/DL
MICROALBUMIN/CREAT UR: NORMAL MG/G (ref 0–30)
POTASSIUM SERPL-SCNC: 3.9 MMOL/L (ref 3.5–5.1)
PROT SERPL-MCNC: 6.7 G/DL (ref 6.4–8.2)
SODIUM SERPL-SCNC: 140 MMOL/L (ref 136–145)
TRIGL SERPL-MCNC: 49 MG/DL (ref 0–150)
VLDLC SERPL CALC-MCNC: 10 MG/DL

## 2024-03-21 ENCOUNTER — OFFICE VISIT (OUTPATIENT)
Dept: ENDOCRINOLOGY | Age: 54
End: 2024-03-21
Payer: COMMERCIAL

## 2024-03-21 ENCOUNTER — HOSPITAL ENCOUNTER (OUTPATIENT)
Dept: WOMENS IMAGING | Age: 54
Discharge: HOME | End: 2024-03-21
Payer: COMMERCIAL

## 2024-03-21 VITALS
DIASTOLIC BLOOD PRESSURE: 62 MMHG | BODY MASS INDEX: 29.35 KG/M2 | SYSTOLIC BLOOD PRESSURE: 103 MMHG | WEIGHT: 187 LBS | HEIGHT: 67 IN | RESPIRATION RATE: 16 BRPM | HEART RATE: 62 BPM

## 2024-03-21 VITALS — BODY MASS INDEX: 28.72 KG/M2 | WEIGHT: 183 LBS | HEIGHT: 67 IN

## 2024-03-21 DIAGNOSIS — E11.9 TYPE 2 DIABETES MELLITUS WITHOUT COMPLICATION, WITH LONG-TERM CURRENT USE OF INSULIN (HCC): Primary | ICD-10-CM

## 2024-03-21 DIAGNOSIS — E78.2 MIXED HYPERLIPIDEMIA: ICD-10-CM

## 2024-03-21 DIAGNOSIS — Z79.4 TYPE 2 DIABETES MELLITUS WITHOUT COMPLICATION, WITH LONG-TERM CURRENT USE OF INSULIN (HCC): Primary | ICD-10-CM

## 2024-03-21 DIAGNOSIS — E66.09 CLASS 1 OBESITY DUE TO EXCESS CALORIES WITH SERIOUS COMORBIDITY AND BODY MASS INDEX (BMI) OF 33.0 TO 33.9 IN ADULT: ICD-10-CM

## 2024-03-21 DIAGNOSIS — Z12.31 VISIT FOR SCREENING MAMMOGRAM: ICD-10-CM

## 2024-03-21 PROCEDURE — 77063 BREAST TOMOSYNTHESIS BI: CPT

## 2024-03-21 PROCEDURE — 3044F HG A1C LEVEL LT 7.0%: CPT | Performed by: INTERNAL MEDICINE

## 2024-03-21 PROCEDURE — 99214 OFFICE O/P EST MOD 30 MIN: CPT | Performed by: INTERNAL MEDICINE

## 2024-03-21 RX ORDER — SEMAGLUTIDE 0.68 MG/ML
INJECTION, SOLUTION SUBCUTANEOUS
Qty: 9 ML | Refills: 1 | Status: SHIPPED | OUTPATIENT
Start: 2024-03-21

## 2024-03-21 RX ORDER — SEMAGLUTIDE 0.68 MG/ML
INJECTION, SOLUTION SUBCUTANEOUS
COMMUNITY
End: 2024-03-21 | Stop reason: ALTCHOICE

## 2024-03-21 NOTE — PROGRESS NOTES
Belen Barbosa is a 54 y.o. female is seen for management of controlled Type 2 diabetes and obesity. Belen Barbosa was diagnosed with Diabetes mellitus at age 40 .  Patient had gestational diabetes with her 2 pregnancies  Never had DKA  She was on NPH insulin and was having frequent hypoglycemia was also taking glipizide at that time once we stopped the dose to her hypoglycemia resolved completely.  At the time of diagnosis patient had dizzy spells and thirsty . Belen Barbosa got diabetic education in the past.  Comorbid conditions:  none  She was initially diet controlled and then on Victoza for years and was later switched to insulin and glipzide, on review of the chart it appears that she was on Jardiance and Januvia at one point  She has hyperlipidemia and is on statins but she feels it is mostly preventive  Previously had hypotension with lisinopril and her medications were stopped.    INTERIM:    Diabetes  She presents for her follow-up diabetic visit. She has type 2 diabetes mellitus. The initial diagnosis of diabetes was made 10 years ago. Her disease course has been stable. Hypoglycemia symptoms include dizziness and hunger. There are no diabetic associated symptoms. Symptoms are improving. Current diabetic treatment includes insulin injections and oral agent (dual therapy). She is compliant with treatment most of the time. She is following a generally healthy diet. Meal planning includes avoidance of concentrated sweets. She has had a previous visit with a dietitian. Her breakfast blood glucose is taken between 7-8 am. Her breakfast blood glucose range is generally  mg/dl. Her lunch blood glucose range is generally <70 mg/dl.      She has gained 17 lbs since last visit   She has lost total 100  lbs since May 2021 to September 2022.    She has been working on diet and exercise and continues to lose weight denies any hypoglycemia  --- takes 1 tablet of Synjardy daily.          Past Medical History:

## 2024-06-05 DIAGNOSIS — Z79.4 TYPE 2 DIABETES MELLITUS WITHOUT COMPLICATION, WITH LONG-TERM CURRENT USE OF INSULIN (HCC): ICD-10-CM

## 2024-06-05 DIAGNOSIS — E11.9 TYPE 2 DIABETES MELLITUS WITHOUT COMPLICATION, WITH LONG-TERM CURRENT USE OF INSULIN (HCC): ICD-10-CM

## 2024-06-05 RX ORDER — ATORVASTATIN CALCIUM 10 MG/1
TABLET, FILM COATED ORAL
Qty: 90 TABLET | Refills: 1 | Status: SHIPPED | OUTPATIENT
Start: 2024-06-05

## 2024-06-05 RX ORDER — EMPAGLIFLOZIN 25 MG/1
25 TABLET, FILM COATED ORAL DAILY
Qty: 90 TABLET | Refills: 1 | Status: SHIPPED | OUTPATIENT
Start: 2024-06-05

## 2024-06-05 RX ORDER — SEMAGLUTIDE 0.68 MG/ML
INJECTION, SOLUTION SUBCUTANEOUS
Qty: 9 ML | Refills: 1 | Status: SHIPPED | OUTPATIENT
Start: 2024-06-05

## 2024-06-14 ENCOUNTER — OFFICE VISIT (OUTPATIENT)
Dept: PRIMARY CARE CLINIC | Age: 54
End: 2024-06-14
Payer: COMMERCIAL

## 2024-06-14 VITALS
HEIGHT: 67 IN | WEIGHT: 191 LBS | DIASTOLIC BLOOD PRESSURE: 58 MMHG | BODY MASS INDEX: 29.98 KG/M2 | OXYGEN SATURATION: 99 % | SYSTOLIC BLOOD PRESSURE: 100 MMHG | TEMPERATURE: 97.9 F | HEART RATE: 73 BPM

## 2024-06-14 DIAGNOSIS — L23.9 ALLERGIC DERMATITIS: ICD-10-CM

## 2024-06-14 DIAGNOSIS — T61.781A ALLERGIC REACTION TO SHELLFISH: Primary | ICD-10-CM

## 2024-06-14 PROCEDURE — 99213 OFFICE O/P EST LOW 20 MIN: CPT | Performed by: NURSE PRACTITIONER

## 2024-06-14 RX ORDER — BETAMETHASONE DIPROPIONATE 0.5 MG/G
CREAM TOPICAL
Qty: 45 G | Refills: 1 | Status: SHIPPED | OUTPATIENT
Start: 2024-06-14 | End: 2024-06-14

## 2024-06-14 RX ORDER — BETAMETHASONE DIPROPIONATE 0.5 MG/G
CREAM TOPICAL
Qty: 45 G | Refills: 1 | Status: SHIPPED | OUTPATIENT
Start: 2024-06-14

## 2024-06-14 RX ORDER — CETIRIZINE HYDROCHLORIDE 10 MG/1
10 TABLET ORAL DAILY
COMMUNITY
Start: 2024-06-14

## 2024-06-14 RX ORDER — EPINEPHRINE 0.3 MG/.3ML
INJECTION SUBCUTANEOUS
Qty: 2 EACH | Refills: 1 | Status: SHIPPED | OUTPATIENT
Start: 2024-06-14

## 2024-06-14 RX ORDER — CLOCORTOLONE PIVALATE 0 G/G
CREAM TOPICAL
Qty: 99 G | Refills: 1 | Status: SHIPPED | OUTPATIENT
Start: 2024-06-14 | End: 2024-06-14 | Stop reason: CLARIF

## 2024-06-14 SDOH — ECONOMIC STABILITY: INCOME INSECURITY: HOW HARD IS IT FOR YOU TO PAY FOR THE VERY BASICS LIKE FOOD, HOUSING, MEDICAL CARE, AND HEATING?: NOT VERY HARD

## 2024-06-14 SDOH — ECONOMIC STABILITY: FOOD INSECURITY: WITHIN THE PAST 12 MONTHS, YOU WORRIED THAT YOUR FOOD WOULD RUN OUT BEFORE YOU GOT MONEY TO BUY MORE.: NEVER TRUE

## 2024-06-14 SDOH — ECONOMIC STABILITY: FOOD INSECURITY: WITHIN THE PAST 12 MONTHS, THE FOOD YOU BOUGHT JUST DIDN'T LAST AND YOU DIDN'T HAVE MONEY TO GET MORE.: NEVER TRUE

## 2024-06-14 ASSESSMENT — ENCOUNTER SYMPTOMS
VOMITING: 0
GLOBUS SENSATION: 0
STRIDOR: 0
COUGH: 0
EYE REDNESS: 0
DIFFICULTY BREATHING: 0
ALLERGIC REACTION: 1
HYPERVENTILATION: 0
TROUBLE SWALLOWING: 0
EYE ITCHING: 0
WHEEZING: 0

## 2024-06-14 ASSESSMENT — PATIENT HEALTH QUESTIONNAIRE - PHQ9
SUM OF ALL RESPONSES TO PHQ9 QUESTIONS 1 & 2: 0
SUM OF ALL RESPONSES TO PHQ QUESTIONS 1-9: 0
1. LITTLE INTEREST OR PLEASURE IN DOING THINGS: NOT AT ALL
SUM OF ALL RESPONSES TO PHQ QUESTIONS 1-9: 0

## 2024-06-14 NOTE — PROGRESS NOTES
6/14/2024    Chief Complaint   Patient presents with    Allergies     Wakes up with puffy, itchy eyes        Belen Barbosa is a 54 y.o. female, presents today for allergic reaction to shellfish. Her eyes are puffy and rash to bilateral upper eyelids      HPI  Allergic Reaction  This is a new problem. Episode onset: Upper eyelid swelling and redness started 2 days ago. The problem occurs constantly. The problem is unchanged. Associated with: Shellfish on 5/25/24. The time of exposure is not relevant (no exposure). Associated symptoms include a rash (Bilateral upper eyelids). Pertinent negatives include no chest pain, chest pressure, coughing, difficulty breathing, drooling, eye itching, eye redness, globus sensation, hyperventilation, itching, stridor, trouble swallowing, vomiting or wheezing. (Swelling to upper eyelids and red rash) Treatments tried: Zyrtec. The treatment provided no relief. Her past medical history is significant for food allergies (Shellfish). There is no history of asthma, atopic dermatitis or seasonal allergies.   Patient denies use of new make-up, soaps, lotions, laundry detergent.      Review of Systems   HENT:  Negative for drooling and trouble swallowing.    Eyes:  Negative for redness and itching.   Respiratory:  Negative for cough, wheezing and stridor.    Cardiovascular:  Negative for chest pain.   Gastrointestinal:  Negative for vomiting.   Skin:  Positive for rash (Bilateral upper eyelids). Negative for itching.   Allergic/Immunologic: Positive for food allergies (Shellfish).       Current Outpatient Medications on File Prior to Visit   Medication Sig Dispense Refill    atorvastatin (LIPITOR) 10 MG tablet TAKE 1 TABLET EVERY DAY 90 tablet 1    Semaglutide,0.25 or 0.5MG/DOS, (OZEMPIC, 0.25 OR 0.5 MG/DOSE,) 2 MG/3ML SOPN INJECT 0.25MG UNDER THE SKIN ONE TIME WEEKLY . PEN EXPIRES IN 56 DAYS 9 mL 1    empagliflozin (JARDIANCE) 25 MG tablet TAKE 1 TABLET EVERY DAY 90 tablet 1

## 2024-06-14 NOTE — PROGRESS NOTES
1. Allergic reaction to shellfish  - EPINEPHrine (EPIPEN 2-HECTOR) 0.3 MG/0.3ML SOAJ injection; Inject Epipen into leg once for allergic reaction to shellfish.  Dispense: 2 each; Refill: 1    2. Allergic dermatitis  - Not filled, not covered by insurance --> clocortolone pivalate (CLODERM) 0.1 % CREA; Apply cream twice daily to bilateral upper eyelids until rash is gone  Dispense: 99 g; Refill: 1  - cetirizine (ZYRTEC ALLERGY) 10 MG tablet; Take 1 tablet by mouth daily  - Start betamethasone dipropionate 0.05 % cream; Apply topically to upper eyelids 2 times daily until rash is gone  Dispense: 45 g; Refill: 1

## 2024-07-29 DIAGNOSIS — Z79.4 TYPE 2 DIABETES MELLITUS WITHOUT COMPLICATION, WITH LONG-TERM CURRENT USE OF INSULIN (HCC): Primary | ICD-10-CM

## 2024-07-29 DIAGNOSIS — E11.9 TYPE 2 DIABETES MELLITUS WITHOUT COMPLICATION, WITH LONG-TERM CURRENT USE OF INSULIN (HCC): Primary | ICD-10-CM

## 2024-07-30 RX ORDER — SEMAGLUTIDE 2.68 MG/ML
INJECTION, SOLUTION SUBCUTANEOUS
Qty: 9 ML | Refills: 1 | Status: SHIPPED | OUTPATIENT
Start: 2024-07-30

## 2024-08-05 DIAGNOSIS — E66.09 CLASS 1 OBESITY DUE TO EXCESS CALORIES WITH SERIOUS COMORBIDITY AND BODY MASS INDEX (BMI) OF 33.0 TO 33.9 IN ADULT: ICD-10-CM

## 2024-08-05 DIAGNOSIS — E11.9 TYPE 2 DIABETES MELLITUS WITHOUT COMPLICATION, WITH LONG-TERM CURRENT USE OF INSULIN (HCC): ICD-10-CM

## 2024-08-05 DIAGNOSIS — Z79.4 TYPE 2 DIABETES MELLITUS WITHOUT COMPLICATION, WITH LONG-TERM CURRENT USE OF INSULIN (HCC): ICD-10-CM

## 2024-08-05 DIAGNOSIS — E78.2 MIXED HYPERLIPIDEMIA: ICD-10-CM

## 2024-08-05 LAB
CREAT UR-MCNC: 97.3 MG/DL (ref 28–259)
MICROALBUMIN UR DL<=1MG/L-MCNC: <1.2 MG/DL
MICROALBUMIN/CREAT UR: NORMAL MG/G (ref 0–30)

## 2024-08-06 LAB
ALBUMIN SERPL-MCNC: 4.3 G/DL (ref 3.4–5)
ALBUMIN/GLOB SERPL: 2 {RATIO} (ref 1.1–2.2)
ALP SERPL-CCNC: 131 U/L (ref 40–129)
ALT SERPL-CCNC: 11 U/L (ref 10–40)
ANION GAP SERPL CALCULATED.3IONS-SCNC: 10 MMOL/L (ref 3–16)
AST SERPL-CCNC: 18 U/L (ref 15–37)
BILIRUB SERPL-MCNC: 0.4 MG/DL (ref 0–1)
BUN SERPL-MCNC: 11 MG/DL (ref 7–20)
CALCIUM SERPL-MCNC: 9.2 MG/DL (ref 8.3–10.6)
CHLORIDE SERPL-SCNC: 102 MMOL/L (ref 99–110)
CHOLEST SERPL-MCNC: 166 MG/DL (ref 0–199)
CO2 SERPL-SCNC: 27 MMOL/L (ref 21–32)
CREAT SERPL-MCNC: 0.7 MG/DL (ref 0.6–1.1)
EST. AVERAGE GLUCOSE BLD GHB EST-MCNC: 125.5 MG/DL
GFR SERPLBLD CREATININE-BSD FMLA CKD-EPI: >90 ML/MIN/{1.73_M2}
GLUCOSE SERPL-MCNC: 126 MG/DL (ref 70–99)
HBA1C MFR BLD: 6 %
HDLC SERPL-MCNC: 75 MG/DL (ref 40–60)
LDLC SERPL CALC-MCNC: 80 MG/DL
POTASSIUM SERPL-SCNC: 4.2 MMOL/L (ref 3.5–5.1)
PROT SERPL-MCNC: 6.4 G/DL (ref 6.4–8.2)
SODIUM SERPL-SCNC: 139 MMOL/L (ref 136–145)
TRIGL SERPL-MCNC: 54 MG/DL (ref 0–150)
TSH SERPL DL<=0.005 MIU/L-ACNC: 1.41 UIU/ML (ref 0.27–4.2)
VLDLC SERPL CALC-MCNC: 11 MG/DL

## 2024-08-08 ENCOUNTER — OFFICE VISIT (OUTPATIENT)
Dept: ENDOCRINOLOGY | Age: 54
End: 2024-08-08
Payer: COMMERCIAL

## 2024-08-08 VITALS
WEIGHT: 192 LBS | DIASTOLIC BLOOD PRESSURE: 64 MMHG | HEART RATE: 73 BPM | HEIGHT: 67 IN | SYSTOLIC BLOOD PRESSURE: 104 MMHG | BODY MASS INDEX: 30.13 KG/M2

## 2024-08-08 DIAGNOSIS — E66.09 CLASS 1 OBESITY DUE TO EXCESS CALORIES WITH SERIOUS COMORBIDITY AND BODY MASS INDEX (BMI) OF 33.0 TO 33.9 IN ADULT: ICD-10-CM

## 2024-08-08 DIAGNOSIS — E11.9 TYPE 2 DIABETES MELLITUS WITHOUT COMPLICATION, WITH LONG-TERM CURRENT USE OF INSULIN (HCC): Primary | ICD-10-CM

## 2024-08-08 DIAGNOSIS — Z79.4 TYPE 2 DIABETES MELLITUS WITHOUT COMPLICATION, WITH LONG-TERM CURRENT USE OF INSULIN (HCC): Primary | ICD-10-CM

## 2024-08-08 DIAGNOSIS — E78.2 MIXED HYPERLIPIDEMIA: ICD-10-CM

## 2024-08-08 PROCEDURE — 3044F HG A1C LEVEL LT 7.0%: CPT | Performed by: INTERNAL MEDICINE

## 2024-08-08 PROCEDURE — 99214 OFFICE O/P EST MOD 30 MIN: CPT | Performed by: INTERNAL MEDICINE

## 2024-08-08 RX ORDER — TIRZEPATIDE 10 MG/.5ML
10 INJECTION, SOLUTION SUBCUTANEOUS WEEKLY
Qty: 4 ADJUSTABLE DOSE PRE-FILLED PEN SYRINGE | Refills: 4 | Status: SHIPPED | OUTPATIENT
Start: 2024-08-08

## 2024-08-08 NOTE — PATIENT INSTRUCTIONS
Patient Education        tirzepatide  Pronunciation:  tir ZEP a tide  Brand:  Mounjaro  What is the most important information I should know about tirzepatide?  Call your doctor at once if you have signs of a thyroid tumor, such as swelling or a lump in your neck, trouble swallowing, a hoarse voice, or shortness of breath.  What is tirzepatide?  Tirzepatide is used together with diet and exercise to improve blood sugar control in adults with type 2 diabetes mellitus.  This medicine is not for treating type 1 diabetes.   Tirzepatide may also be used for purposes not listed in this medication guide.  What should I discuss with my healthcare provider before using tirzepatide?  You should not use tirzepatide if you are allergic to it, or if you have:  a personal or family history of medullary thyroid carcinoma (a type of thyroid cancer); or  multiple endocrine neoplasia type 2 (tumors in your glands).  Tell your doctor if you have ever had:  pancreas problems;  kidney disease;  a severe stomach problem such as problems with digesting food or slowed emptying of your stomach (gastroparesis); or  diabetic retinopathy (a diabetes complication that affects the eyes).  It is not known if tirzepatide will harm an unborn baby. Tell your doctor if you are pregnant or plan to become pregnant.  Tirzepatide can make birth control pills less effective. Ask your doctor about other birth control options such as an injection, implant, skin patch, vaginal ring, condom, diaphragm, cervical cap, or contraceptive sponge. If you take birth control pills you may need to use different birth control options for 4 weeks after starting this medicine and for 4 weeks each time the dose is raised.  Ask a doctor if it is safe to breastfeed while using this medicine.  How should I use tirzepatide?  Follow all directions on your prescription label and read all medication guides or instruction sheets. Your doctor may occasionally change your dose. Use

## 2024-08-19 ENCOUNTER — OFFICE VISIT (OUTPATIENT)
Dept: PRIMARY CARE CLINIC | Age: 54
End: 2024-08-19
Payer: COMMERCIAL

## 2024-08-19 VITALS
HEART RATE: 82 BPM | SYSTOLIC BLOOD PRESSURE: 104 MMHG | OXYGEN SATURATION: 97 % | BODY MASS INDEX: 30.23 KG/M2 | DIASTOLIC BLOOD PRESSURE: 64 MMHG | WEIGHT: 193 LBS

## 2024-08-19 DIAGNOSIS — T14.8XXA BRUISING: ICD-10-CM

## 2024-08-19 DIAGNOSIS — T14.8XXA BRUISING: Primary | ICD-10-CM

## 2024-08-19 LAB
APTT BLD: 29.3 SEC (ref 22.1–36.4)
BASOPHILS # BLD: 0 K/UL (ref 0–0.2)
BASOPHILS NFR BLD: 0.4 %
DEPRECATED RDW RBC AUTO: 13.2 % (ref 12.4–15.4)
EOSINOPHIL # BLD: 0.3 K/UL (ref 0–0.6)
EOSINOPHIL NFR BLD: 4.7 %
HCT VFR BLD AUTO: 39.6 % (ref 36–48)
HGB BLD-MCNC: 13.5 G/DL (ref 12–16)
INR PPP: 1.04 (ref 0.85–1.15)
LYMPHOCYTES # BLD: 1.6 K/UL (ref 1–5.1)
LYMPHOCYTES NFR BLD: 24.9 %
MCH RBC QN AUTO: 32 PG (ref 26–34)
MCHC RBC AUTO-ENTMCNC: 34.1 G/DL (ref 31–36)
MCV RBC AUTO: 93.7 FL (ref 80–100)
MONOCYTES # BLD: 0.3 K/UL (ref 0–1.3)
MONOCYTES NFR BLD: 5.4 %
NEUTROPHILS # BLD: 4 K/UL (ref 1.7–7.7)
NEUTROPHILS NFR BLD: 64.6 %
PATH INTERP BLD-IMP: NORMAL
PLATELET # BLD AUTO: 168 K/UL (ref 135–450)
PMV BLD AUTO: 9.8 FL (ref 5–10.5)
PROTHROMBIN TIME: 13.9 SEC (ref 11.9–14.9)
RBC # BLD AUTO: 4.23 M/UL (ref 4–5.2)
WBC # BLD AUTO: 6.2 K/UL (ref 4–11)

## 2024-08-19 PROCEDURE — 99213 OFFICE O/P EST LOW 20 MIN: CPT | Performed by: NURSE PRACTITIONER

## 2024-08-19 ASSESSMENT — ENCOUNTER SYMPTOMS
COLOR CHANGE: 1
COUGH: 0
SHORTNESS OF BREATH: 0
GASTROINTESTINAL NEGATIVE: 1
WHEEZING: 0
CHEST TIGHTNESS: 0

## 2024-08-19 ASSESSMENT — PATIENT HEALTH QUESTIONNAIRE - PHQ9
1. LITTLE INTEREST OR PLEASURE IN DOING THINGS: NOT AT ALL
SUM OF ALL RESPONSES TO PHQ QUESTIONS 1-9: 0
SUM OF ALL RESPONSES TO PHQ9 QUESTIONS 1 & 2: 0
SUM OF ALL RESPONSES TO PHQ QUESTIONS 1-9: 0
2. FEELING DOWN, DEPRESSED OR HOPELESS: NOT AT ALL

## 2024-08-20 LAB — PATH INTERP BLD-IMP: NORMAL

## 2024-09-24 DIAGNOSIS — T61.781A ALLERGIC REACTION TO SHELLFISH: ICD-10-CM

## 2024-09-25 RX ORDER — EPINEPHRINE 0.3 MG/.3ML
INJECTION SUBCUTANEOUS
Qty: 2 EACH | Refills: 3 | Status: SHIPPED | OUTPATIENT
Start: 2024-09-25

## 2024-11-22 ENCOUNTER — NURSE ONLY (OUTPATIENT)
Dept: PRIMARY CARE CLINIC | Age: 54
End: 2024-11-22
Payer: COMMERCIAL

## 2024-11-22 DIAGNOSIS — Z23 NEED FOR VACCINATION: Primary | ICD-10-CM

## 2024-11-22 PROCEDURE — 90661 CCIIV3 VAC ABX FR 0.5 ML IM: CPT | Performed by: NURSE PRACTITIONER

## 2024-11-22 PROCEDURE — 90471 IMMUNIZATION ADMIN: CPT | Performed by: NURSE PRACTITIONER

## 2024-12-10 ENCOUNTER — TELEPHONE (OUTPATIENT)
Dept: PRIMARY CARE CLINIC | Age: 54
End: 2024-12-10

## 2024-12-11 ENCOUNTER — OFFICE VISIT (OUTPATIENT)
Dept: PRIMARY CARE CLINIC | Age: 54
End: 2024-12-11
Payer: COMMERCIAL

## 2024-12-11 VITALS
OXYGEN SATURATION: 99 % | WEIGHT: 197.8 LBS | SYSTOLIC BLOOD PRESSURE: 108 MMHG | TEMPERATURE: 97.6 F | BODY MASS INDEX: 31.04 KG/M2 | HEIGHT: 67 IN | DIASTOLIC BLOOD PRESSURE: 72 MMHG | HEART RATE: 67 BPM

## 2024-12-11 DIAGNOSIS — B37.31 VAGINAL YEAST INFECTION: Primary | ICD-10-CM

## 2024-12-11 DIAGNOSIS — N89.8 VAGINAL DISCHARGE: ICD-10-CM

## 2024-12-11 PROCEDURE — G8427 DOCREV CUR MEDS BY ELIG CLIN: HCPCS | Performed by: NURSE PRACTITIONER

## 2024-12-11 PROCEDURE — G8484 FLU IMMUNIZE NO ADMIN: HCPCS | Performed by: NURSE PRACTITIONER

## 2024-12-11 PROCEDURE — 1036F TOBACCO NON-USER: CPT | Performed by: NURSE PRACTITIONER

## 2024-12-11 PROCEDURE — 99214 OFFICE O/P EST MOD 30 MIN: CPT | Performed by: NURSE PRACTITIONER

## 2024-12-11 PROCEDURE — 3017F COLORECTAL CA SCREEN DOC REV: CPT | Performed by: NURSE PRACTITIONER

## 2024-12-11 PROCEDURE — G8417 CALC BMI ABV UP PARAM F/U: HCPCS | Performed by: NURSE PRACTITIONER

## 2024-12-11 RX ORDER — FLUCONAZOLE 150 MG/1
150 TABLET ORAL ONCE
Qty: 1 TABLET | Refills: 0 | Status: SHIPPED | OUTPATIENT
Start: 2024-12-11 | End: 2024-12-11

## 2024-12-11 RX ORDER — FLUCONAZOLE 150 MG/1
150 TABLET ORAL ONCE
Qty: 1 TABLET | Refills: 0 | Status: CANCELLED | OUTPATIENT
Start: 2024-12-11 | End: 2024-12-11

## 2024-12-11 ASSESSMENT — ENCOUNTER SYMPTOMS
GASTROINTESTINAL NEGATIVE: 1
COUGH: 0
WHEEZING: 0
CHEST TIGHTNESS: 0
SHORTNESS OF BREATH: 0

## 2024-12-11 NOTE — PROGRESS NOTES
12/11/24    Chief Complaint   Patient presents with    Vaginitis     For evaluation for yeast infection        Subjective:   54 y.o. female complains of white, thick, and scant odor.  Associated symptoms include vaginal itching. Symptoms started 12/5/24 a couple days after she started taking antibiotics (left over from previous infection). She has a history of  vaginal yeast infections with antibiotic.   Denies abnormal vaginal bleeding or significant pelvic pain or fever.  No UTI symptoms. Denies history of known exposure to STD.    Patient has an allergy to Diflucan, reporting \"slight lip swelling with itching. More itching then swelling\".  Patient reports \"she is able to take for a bad yeast infection and takes Benadryl\". In the past she reports Benadryl controls reaction. Denies difficulty breathing, swelling of tongue or throat, difficulty swallowing with past use of Diflucan. She has an EpiPen that is current. Indications discussed at length when to use if needed after taking Diflucan. Patient agrees to plan with verbal understanding.    Patient's last menstrual period was 11/24/2015.    Review of Systems   Constitutional:  Negative for activity change, appetite change, diaphoresis, fatigue and unexpected weight change.   Respiratory:  Negative for cough, chest tightness, shortness of breath and wheezing.    Cardiovascular:  Negative for chest pain, palpitations and leg swelling.   Gastrointestinal: Negative.    Genitourinary:  Positive for vaginal discharge. Negative for difficulty urinating, dyspareunia, dysuria, frequency, genital sores, pelvic pain, urgency, vaginal bleeding and vaginal pain.   Neurological:  Negative for dizziness, facial asymmetry, weakness, light-headedness, numbness and headaches.        Objective:   Physical Exam  Vitals and nursing note reviewed.   Constitutional:       General: She is not in acute distress.     Appearance: Normal appearance.   Neck:      Vascular: No carotid

## 2024-12-12 LAB
BV BACTERIA DNA VAG QL NAA+PROBE: NOT DETECTED
C GLABRATA DNA VAG QL NAA+PROBE: NORMAL
C GLABRATA DNA VAG QL NAA+PROBE: NOT DETECTED
C KRUSEI DNA VAG QL NAA+PROBE: NOT DETECTED
CANDIDA DNA VAG QL NAA+PROBE: NOT DETECTED
T VAGINALIS DNA VAG QL NAA+PROBE: NOT DETECTED

## 2025-01-10 DIAGNOSIS — Z79.4 TYPE 2 DIABETES MELLITUS WITHOUT COMPLICATION, WITH LONG-TERM CURRENT USE OF INSULIN (HCC): ICD-10-CM

## 2025-01-10 DIAGNOSIS — E11.9 TYPE 2 DIABETES MELLITUS WITHOUT COMPLICATION, WITH LONG-TERM CURRENT USE OF INSULIN (HCC): ICD-10-CM

## 2025-01-13 NOTE — TELEPHONE ENCOUNTER
Medication:   Requested Prescriptions     Pending Prescriptions Disp Refills    empagliflozin (JARDIANCE) 25 MG tablet 90 tablet 1     Sig: Take 1 tablet by mouth daily         Last appt: 8/8/2024   Next appt: 2/27/2025    Last Labs DM:   Lab Results   Component Value Date/Time    LABA1C 6.0 08/05/2024 08:21 AM     Last Lipid:   Lab Results   Component Value Date/Time    CHOL 166 08/05/2024 08:21 AM    TRIG 54 08/05/2024 08:21 AM    HDL 75 08/05/2024 08:21 AM     Last PSA: No results found for: \"PSA\"  Last Thyroid:   Lab Results   Component Value Date/Time    TSH 1.41 08/05/2024 08:21 AM

## 2025-01-28 ASSESSMENT — PATIENT HEALTH QUESTIONNAIRE - PHQ9
1. LITTLE INTEREST OR PLEASURE IN DOING THINGS: NOT AT ALL
2. FEELING DOWN, DEPRESSED OR HOPELESS: NOT AT ALL
SUM OF ALL RESPONSES TO PHQ9 QUESTIONS 1 & 2: 0
2. FEELING DOWN, DEPRESSED OR HOPELESS: NOT AT ALL
SUM OF ALL RESPONSES TO PHQ QUESTIONS 1-9: 0
1. LITTLE INTEREST OR PLEASURE IN DOING THINGS: NOT AT ALL
SUM OF ALL RESPONSES TO PHQ9 QUESTIONS 1 & 2: 0
SUM OF ALL RESPONSES TO PHQ QUESTIONS 1-9: 0

## 2025-01-29 ENCOUNTER — OFFICE VISIT (OUTPATIENT)
Dept: PRIMARY CARE CLINIC | Age: 55
End: 2025-01-29
Payer: COMMERCIAL

## 2025-01-29 VITALS
SYSTOLIC BLOOD PRESSURE: 126 MMHG | HEART RATE: 68 BPM | RESPIRATION RATE: 15 BRPM | WEIGHT: 194 LBS | TEMPERATURE: 98.6 F | HEIGHT: 67 IN | OXYGEN SATURATION: 96 % | BODY MASS INDEX: 30.45 KG/M2 | DIASTOLIC BLOOD PRESSURE: 70 MMHG

## 2025-01-29 DIAGNOSIS — Z00.00 ANNUAL PHYSICAL EXAM: Primary | ICD-10-CM

## 2025-01-29 DIAGNOSIS — Z13.21 ENCOUNTER FOR VITAMIN DEFICIENCY SCREENING: ICD-10-CM

## 2025-01-29 DIAGNOSIS — E11.9 TYPE 2 DIABETES MELLITUS WITHOUT COMPLICATION, WITH LONG-TERM CURRENT USE OF INSULIN (HCC): ICD-10-CM

## 2025-01-29 DIAGNOSIS — Z79.4 TYPE 2 DIABETES MELLITUS WITHOUT COMPLICATION, WITH LONG-TERM CURRENT USE OF INSULIN (HCC): ICD-10-CM

## 2025-01-29 PROBLEM — S80.02XA CONTUSION OF LEFT KNEE: Status: RESOLVED | Noted: 2021-02-17 | Resolved: 2025-01-29

## 2025-01-29 PROBLEM — S93.492A SPRAIN OF ANTERIOR TALOFIBULAR LIGAMENT OF LEFT ANKLE: Status: RESOLVED | Noted: 2021-02-17 | Resolved: 2025-01-29

## 2025-01-29 PROCEDURE — 99396 PREV VISIT EST AGE 40-64: CPT | Performed by: NURSE PRACTITIONER

## 2025-01-29 SDOH — ECONOMIC STABILITY: FOOD INSECURITY: WITHIN THE PAST 12 MONTHS, YOU WORRIED THAT YOUR FOOD WOULD RUN OUT BEFORE YOU GOT MONEY TO BUY MORE.: NEVER TRUE

## 2025-01-29 SDOH — ECONOMIC STABILITY: FOOD INSECURITY: WITHIN THE PAST 12 MONTHS, THE FOOD YOU BOUGHT JUST DIDN'T LAST AND YOU DIDN'T HAVE MONEY TO GET MORE.: NEVER TRUE

## 2025-01-29 ASSESSMENT — ENCOUNTER SYMPTOMS
CHEST TIGHTNESS: 0
DIARRHEA: 0
CONSTIPATION: 0
SHORTNESS OF BREATH: 0

## 2025-01-29 NOTE — PROGRESS NOTES
1/29/2025    Chief Complaint   Patient presents with    Annual Exam     54-year-old female annual physical exam       Belen Barbosa is a 54 y.o. female, presents today for annual physical exam    HPI   Belen reports she is overall healthy and has no health complaints today. She requests for physical form for her employer to be filled out.     - Mammogram: 3/21/2024 (normal)  - Screening colonoscopy: 3/18/2024  - DEXA scan: 3/25/24: NORMAL bone mineral density. Consider repeat DXA exam in 2 years to assess bone density change.   - Has dental cleanings every 6 months  - Annual eye exams (always in Feb or March)- appt in March 2025    Diabetes  She is treated for diabetes by endocrinologist, Dr. Rochelle Brooks MD  Last hemoglobin A1c 8/5/2024--> 6.0%  Current medication: Jardiance 25 mg daily, Mounjaro 10 mg weekly (restarted on 1/28/25)  Previously lost total 100  lbs from May 2021 to September 2022.    She is following a generally healthy diet. Meal planning includes avoidance of concentrated sweets. She has had a previous visit with a dietitian.     Lipitor 10 mg taken daily for preventative.    Review of Systems   Constitutional:  Negative for activity change, appetite change, fatigue and unexpected weight change.   HENT: Negative.     Eyes:  Negative for visual disturbance.   Respiratory:  Negative for chest tightness and shortness of breath.    Cardiovascular:  Negative for chest pain, palpitations and leg swelling.   Gastrointestinal:  Negative for constipation and diarrhea.   Endocrine: Negative for cold intolerance and heat intolerance.   Genitourinary: Negative.    Musculoskeletal:  Negative for arthralgias and myalgias.   Skin: Negative.    Neurological:  Negative for dizziness, tremors, weakness, light-headedness and headaches.   Psychiatric/Behavioral:  Negative for dysphoric mood and sleep disturbance. The patient is not nervous/anxious.        Current Outpatient Medications on File Prior to Visit

## 2025-02-24 DIAGNOSIS — Z79.4 TYPE 2 DIABETES MELLITUS WITHOUT COMPLICATION, WITH LONG-TERM CURRENT USE OF INSULIN (HCC): ICD-10-CM

## 2025-02-24 DIAGNOSIS — E11.9 TYPE 2 DIABETES MELLITUS WITHOUT COMPLICATION, WITH LONG-TERM CURRENT USE OF INSULIN (HCC): ICD-10-CM

## 2025-02-24 DIAGNOSIS — E66.811 CLASS 1 OBESITY DUE TO EXCESS CALORIES WITH SERIOUS COMORBIDITY AND BODY MASS INDEX (BMI) OF 33.0 TO 33.9 IN ADULT: ICD-10-CM

## 2025-02-24 DIAGNOSIS — E66.09 CLASS 1 OBESITY DUE TO EXCESS CALORIES WITH SERIOUS COMORBIDITY AND BODY MASS INDEX (BMI) OF 33.0 TO 33.9 IN ADULT: ICD-10-CM

## 2025-02-24 DIAGNOSIS — E78.2 MIXED HYPERLIPIDEMIA: ICD-10-CM

## 2025-02-25 LAB
ALBUMIN SERPL-MCNC: 3.9 G/DL (ref 3.4–5)
ALBUMIN/GLOB SERPL: 2.1 {RATIO} (ref 1.1–2.2)
ALP SERPL-CCNC: 107 U/L (ref 40–129)
ALT SERPL-CCNC: 19 U/L (ref 10–40)
ANION GAP SERPL CALCULATED.3IONS-SCNC: 9 MMOL/L (ref 3–16)
AST SERPL-CCNC: 26 U/L (ref 15–37)
BILIRUB SERPL-MCNC: 0.3 MG/DL (ref 0–1)
BUN SERPL-MCNC: 11 MG/DL (ref 7–20)
CALCIUM SERPL-MCNC: 8.8 MG/DL (ref 8.3–10.6)
CHLORIDE SERPL-SCNC: 106 MMOL/L (ref 99–110)
CHOLEST SERPL-MCNC: 127 MG/DL (ref 0–199)
CO2 SERPL-SCNC: 27 MMOL/L (ref 21–32)
CREAT SERPL-MCNC: 0.6 MG/DL (ref 0.6–1.1)
CREAT UR-MCNC: 86.9 MG/DL (ref 28–259)
EST. AVERAGE GLUCOSE BLD GHB EST-MCNC: 131.2 MG/DL
GFR SERPLBLD CREATININE-BSD FMLA CKD-EPI: >90 ML/MIN/{1.73_M2}
GLUCOSE SERPL-MCNC: 121 MG/DL (ref 70–99)
HBA1C MFR BLD: 6.2 %
HDLC SERPL-MCNC: 59 MG/DL (ref 40–60)
LDLC SERPL CALC-MCNC: 61 MG/DL
MICROALBUMIN UR DL<=1MG/L-MCNC: <1.2 MG/DL
MICROALBUMIN/CREAT UR: NORMAL MG/G (ref 0–30)
POTASSIUM SERPL-SCNC: 3.7 MMOL/L (ref 3.5–5.1)
PROT SERPL-MCNC: 5.8 G/DL (ref 6.4–8.2)
SODIUM SERPL-SCNC: 142 MMOL/L (ref 136–145)
TRIGL SERPL-MCNC: 37 MG/DL (ref 0–150)
TSH SERPL DL<=0.005 MIU/L-ACNC: 1.57 UIU/ML (ref 0.27–4.2)
VLDLC SERPL CALC-MCNC: 7 MG/DL

## 2025-02-27 ENCOUNTER — OFFICE VISIT (OUTPATIENT)
Dept: ENDOCRINOLOGY | Age: 55
End: 2025-02-27
Payer: COMMERCIAL

## 2025-02-27 VITALS
DIASTOLIC BLOOD PRESSURE: 60 MMHG | SYSTOLIC BLOOD PRESSURE: 108 MMHG | WEIGHT: 198 LBS | HEIGHT: 67 IN | TEMPERATURE: 98 F | RESPIRATION RATE: 14 BRPM | HEART RATE: 67 BPM | BODY MASS INDEX: 31.08 KG/M2

## 2025-02-27 DIAGNOSIS — E78.2 MIXED HYPERLIPIDEMIA: ICD-10-CM

## 2025-02-27 DIAGNOSIS — E11.9 TYPE 2 DIABETES MELLITUS WITHOUT COMPLICATION, WITH LONG-TERM CURRENT USE OF INSULIN (HCC): Primary | ICD-10-CM

## 2025-02-27 DIAGNOSIS — Z79.4 TYPE 2 DIABETES MELLITUS WITHOUT COMPLICATION, WITH LONG-TERM CURRENT USE OF INSULIN (HCC): Primary | ICD-10-CM

## 2025-02-27 DIAGNOSIS — E66.09 CLASS 1 OBESITY DUE TO EXCESS CALORIES WITH SERIOUS COMORBIDITY AND BODY MASS INDEX (BMI) OF 33.0 TO 33.9 IN ADULT: ICD-10-CM

## 2025-02-27 DIAGNOSIS — L73.2 HIDRADENITIS AXILLARIS: ICD-10-CM

## 2025-02-27 DIAGNOSIS — E66.811 CLASS 1 OBESITY DUE TO EXCESS CALORIES WITH SERIOUS COMORBIDITY AND BODY MASS INDEX (BMI) OF 33.0 TO 33.9 IN ADULT: ICD-10-CM

## 2025-02-27 PROCEDURE — G8427 DOCREV CUR MEDS BY ELIG CLIN: HCPCS | Performed by: INTERNAL MEDICINE

## 2025-02-27 PROCEDURE — G8417 CALC BMI ABV UP PARAM F/U: HCPCS | Performed by: INTERNAL MEDICINE

## 2025-02-27 PROCEDURE — 3044F HG A1C LEVEL LT 7.0%: CPT | Performed by: INTERNAL MEDICINE

## 2025-02-27 PROCEDURE — 3017F COLORECTAL CA SCREEN DOC REV: CPT | Performed by: INTERNAL MEDICINE

## 2025-02-27 PROCEDURE — 2022F DILAT RTA XM EVC RTNOPTHY: CPT | Performed by: INTERNAL MEDICINE

## 2025-02-27 PROCEDURE — 1036F TOBACCO NON-USER: CPT | Performed by: INTERNAL MEDICINE

## 2025-02-27 PROCEDURE — 99214 OFFICE O/P EST MOD 30 MIN: CPT | Performed by: INTERNAL MEDICINE

## 2025-02-27 NOTE — PROGRESS NOTES
Belen Barbosa is a 54 y.o. female is seen for management of controlled Type 2 diabetes and obesity. Belen Barbosa was diagnosed with Diabetes mellitus at age 40 .  Patient had gestational diabetes with her 2 pregnancies  Never had DKA  She was on NPH insulin and was having frequent hypoglycemia was also taking glipizide at that time once we stopped the dose to her hypoglycemia resolved completely.  At the time of diagnosis patient had dizzy spells and thirsty . Belen Barbosa got diabetic education in the past.  Comorbid conditions:  none  She was initially diet controlled and then on Victoza for years and was later switched to insulin and glipzide, on review of the chart it appears that she was on Jardiance and Januvia at one point  She has hyperlipidemia and is on statins but she feels it is mostly preventive  Previously had hypotension with lisinopril and her medications were stopped.    INTERIM:       She has gained 5  lbs since last visit in aug 2024   Previously lost total 100  lbs from May 2021 to September 2022.    --- takes 1 tablet of Synjardy daily.  Daughter got diagnosed with MS since last visit and has been got diagnosed with prostate cancer so she fell off the [facility] in terms of dietary modification but promises to do so        Past Medical History:   Diagnosis Date    Contusion of left knee 02/17/2021    Diabetes ([facility])     Hyperlipidemia     Sprain of anterior talofibular ligament of left ankle 02/17/2021    Type 2 diabetes mellitus without complication ([facility]) 2010      Patient Active Problem List   Diagnosis    Hidradenitis axillaris    Type 2 diabetes mellitus without complication, with long-term current use of insulin ([facility])    Class 1 obesity due to excess calories with serious comorbidity and body mass index (BMI) of 33.0 to 33.9 in adult    Facial numbness    Scar tissue    Mixed hyperlipidemia     Past Surgical History:   Procedure Laterality Date    BREAST REDUCTION SURGERY Bilateral 2009

## 2025-03-21 ENCOUNTER — HOSPITAL ENCOUNTER (OUTPATIENT)
Dept: WOMENS IMAGING | Age: 55
Discharge: HOME | End: 2025-03-21
Payer: COMMERCIAL

## 2025-03-21 VITALS — WEIGHT: 195 LBS | BODY MASS INDEX: 29.55 KG/M2 | HEIGHT: 68 IN

## 2025-03-21 DIAGNOSIS — Z12.31 ENCOUNTER FOR SCREENING MAMMOGRAM FOR BREAST CANCER: ICD-10-CM

## 2025-03-21 PROCEDURE — 77063 BREAST TOMOSYNTHESIS BI: CPT

## 2025-03-27 ENCOUNTER — RESULTS FOLLOW-UP (OUTPATIENT)
Dept: PRIMARY CARE CLINIC | Age: 55
End: 2025-03-27

## 2025-03-28 ENCOUNTER — PATIENT MESSAGE (OUTPATIENT)
Dept: ENDOCRINOLOGY | Age: 55
End: 2025-03-28

## 2025-04-02 ENCOUNTER — TELEPHONE (OUTPATIENT)
Dept: ENDOCRINOLOGY | Age: 55
End: 2025-04-02

## 2025-04-02 DIAGNOSIS — Z79.4 TYPE 2 DIABETES MELLITUS WITHOUT COMPLICATION, WITH LONG-TERM CURRENT USE OF INSULIN: Primary | ICD-10-CM

## 2025-04-02 DIAGNOSIS — E11.9 TYPE 2 DIABETES MELLITUS WITHOUT COMPLICATION, WITH LONG-TERM CURRENT USE OF INSULIN: Primary | ICD-10-CM

## 2025-04-03 NOTE — TELEPHONE ENCOUNTER
Submitted PA for Mounjaro  Via [facility]: [id number]   STATUS: This medication or product is on your plan's list of covered drugs. Prior authorization is not required at this time. If your pharmacy has questions regarding the processing of your prescription, please have them call the [facility] pharmacy help desk at [phone]

## 2025-04-04 NOTE — TELEPHONE ENCOUNTER
LMOM and sent [employer] message- need to know which local pharmacy the patient would like to use.

## 2025-04-04 NOTE — TELEPHONE ENCOUNTER
Fax from [facility] w/ medical clarif    There are market disruption reported from our  of Mounjaro. We are unable to fill the rx for this medication. A new rx should be directed to another pharmacy of the pt's choice

## 2025-06-09 DIAGNOSIS — Z79.4 TYPE 2 DIABETES MELLITUS WITHOUT COMPLICATION, WITH LONG-TERM CURRENT USE OF INSULIN (HCC): ICD-10-CM

## 2025-06-09 DIAGNOSIS — E11.9 TYPE 2 DIABETES MELLITUS WITHOUT COMPLICATION, WITH LONG-TERM CURRENT USE OF INSULIN (HCC): ICD-10-CM

## 2025-06-09 RX ORDER — EMPAGLIFLOZIN 25 MG/1
25 TABLET, FILM COATED ORAL DAILY
Qty: 90 TABLET | Refills: 1 | Status: SHIPPED | OUTPATIENT
Start: 2025-06-09

## 2025-06-20 DIAGNOSIS — E78.2 MIXED HYPERLIPIDEMIA: ICD-10-CM

## 2025-06-20 DIAGNOSIS — Z79.4 TYPE 2 DIABETES MELLITUS WITHOUT COMPLICATION, WITH LONG-TERM CURRENT USE OF INSULIN (HCC): ICD-10-CM

## 2025-06-20 DIAGNOSIS — E66.811 CLASS 1 OBESITY DUE TO EXCESS CALORIES WITH SERIOUS COMORBIDITY AND BODY MASS INDEX (BMI) OF 33.0 TO 33.9 IN ADULT: ICD-10-CM

## 2025-06-20 DIAGNOSIS — E11.9 TYPE 2 DIABETES MELLITUS WITHOUT COMPLICATION, WITH LONG-TERM CURRENT USE OF INSULIN (HCC): ICD-10-CM

## 2025-06-20 DIAGNOSIS — E66.09 CLASS 1 OBESITY DUE TO EXCESS CALORIES WITH SERIOUS COMORBIDITY AND BODY MASS INDEX (BMI) OF 33.0 TO 33.9 IN ADULT: ICD-10-CM

## 2025-06-20 LAB
ALBUMIN SERPL-MCNC: 4.3 G/DL (ref 3.4–5)
ALBUMIN/GLOB SERPL: 2 {RATIO} (ref 1.1–2.2)
ALP SERPL-CCNC: 104 U/L (ref 40–129)
ALT SERPL-CCNC: 17 U/L (ref 10–40)
ANION GAP SERPL CALCULATED.3IONS-SCNC: 9 MMOL/L (ref 3–16)
AST SERPL-CCNC: 22 U/L (ref 15–37)
BILIRUB SERPL-MCNC: 0.5 MG/DL (ref 0–1)
BUN SERPL-MCNC: 14 MG/DL (ref 7–20)
CALCIUM SERPL-MCNC: 8.9 MG/DL (ref 8.3–10.6)
CHLORIDE SERPL-SCNC: 106 MMOL/L (ref 99–110)
CHOLEST SERPL-MCNC: 138 MG/DL (ref 0–199)
CO2 SERPL-SCNC: 28 MMOL/L (ref 21–32)
CREAT SERPL-MCNC: 0.6 MG/DL (ref 0.6–1.1)
CREAT UR-MCNC: 98.7 MG/DL (ref 28–259)
GFR SERPLBLD CREATININE-BSD FMLA CKD-EPI: >90 ML/MIN/{1.73_M2}
GLUCOSE SERPL-MCNC: 92 MG/DL (ref 70–99)
HDLC SERPL-MCNC: 67 MG/DL (ref 40–60)
LDLC SERPL CALC-MCNC: 63 MG/DL
MICROALBUMIN UR DL<=1MG/L-MCNC: <1.2 MG/DL
MICROALBUMIN/CREAT UR: NORMAL MG/G (ref 0–30)
POTASSIUM SERPL-SCNC: 4.1 MMOL/L (ref 3.5–5.1)
PROT SERPL-MCNC: 6.4 G/DL (ref 6.4–8.2)
SODIUM SERPL-SCNC: 143 MMOL/L (ref 136–145)
TRIGL SERPL-MCNC: 42 MG/DL (ref 0–150)
TSH SERPL DL<=0.005 MIU/L-ACNC: 1.07 UIU/ML (ref 0.27–4.2)
VLDLC SERPL CALC-MCNC: 8 MG/DL

## 2025-06-21 LAB
EST. AVERAGE GLUCOSE BLD GHB EST-MCNC: 119.8 MG/DL
HBA1C MFR BLD: 5.8 %

## 2025-06-24 ENCOUNTER — OFFICE VISIT (OUTPATIENT)
Dept: ENDOCRINOLOGY | Age: 55
End: 2025-06-24
Payer: COMMERCIAL

## 2025-06-24 VITALS
HEART RATE: 78 BPM | DIASTOLIC BLOOD PRESSURE: 60 MMHG | BODY MASS INDEX: 29.1 KG/M2 | SYSTOLIC BLOOD PRESSURE: 101 MMHG | HEIGHT: 68 IN | WEIGHT: 192 LBS

## 2025-06-24 DIAGNOSIS — E11.9 TYPE 2 DIABETES MELLITUS WITHOUT COMPLICATION, WITH LONG-TERM CURRENT USE OF INSULIN (HCC): ICD-10-CM

## 2025-06-24 DIAGNOSIS — Z79.4 TYPE 2 DIABETES MELLITUS WITHOUT COMPLICATION, WITH LONG-TERM CURRENT USE OF INSULIN (HCC): ICD-10-CM

## 2025-06-24 PROCEDURE — G8427 DOCREV CUR MEDS BY ELIG CLIN: HCPCS | Performed by: INTERNAL MEDICINE

## 2025-06-24 PROCEDURE — G8417 CALC BMI ABV UP PARAM F/U: HCPCS | Performed by: INTERNAL MEDICINE

## 2025-06-24 PROCEDURE — 1036F TOBACCO NON-USER: CPT | Performed by: INTERNAL MEDICINE

## 2025-06-24 PROCEDURE — 3044F HG A1C LEVEL LT 7.0%: CPT | Performed by: INTERNAL MEDICINE

## 2025-06-24 PROCEDURE — 3017F COLORECTAL CA SCREEN DOC REV: CPT | Performed by: INTERNAL MEDICINE

## 2025-06-24 PROCEDURE — 2022F DILAT RTA XM EVC RTNOPTHY: CPT | Performed by: INTERNAL MEDICINE

## 2025-06-24 PROCEDURE — 99214 OFFICE O/P EST MOD 30 MIN: CPT | Performed by: INTERNAL MEDICINE

## 2025-06-24 RX ORDER — ATORVASTATIN CALCIUM 10 MG/1
10 TABLET, FILM COATED ORAL DAILY
Qty: 90 TABLET | Refills: 1 | Status: SHIPPED | OUTPATIENT
Start: 2025-06-24

## 2025-06-24 NOTE — PROGRESS NOTES
Belen Barbosa is a 55 y.o. female is seen for management of controlled Type 2 diabetes and obesity. Belen Barbosa was diagnosed with Diabetes mellitus at age 40 .  Patient had gestational diabetes with her 2 pregnancies  Never had DKA  She was on NPH insulin and was having frequent hypoglycemia was also taking glipizide at that time once we stopped the dose to her hypoglycemia resolved completely.  At the time of diagnosis patient had dizzy spells and thirsty . Belen Barbosa got diabetic education in the past.  Comorbid conditions: none  She was initially diet controlled and then on Victoza for years and was later switched to insulin and glipzide, on review of the chart it appears that she was on Jardiance and Januvia at one point  She has hyperlipidemia and is on statins but she feels it is mostly preventive  Previously had hypotension with lisinopril and her medications were stopped.    INTERIM:       She has gained 5  lbs since last visit in aug 2024   Previously lost total 100  lbs from May 2021 to September 2022.    --- takes 1 tablet of Synjardy daily.  Daughter got diagnosed with MS since last visit and has been got diagnosed with prostate cancer so she fell off the [facility] in terms of dietary modification but promises to do so        Past Medical History:   Diagnosis Date    Contusion of left knee 02/17/2021    Diabetes ([facility])     Hyperlipidemia     Sprain of anterior talofibular ligament of left ankle 02/17/2021    Type 2 diabetes mellitus without complication ([facility]) 2010      Patient Active Problem List   Diagnosis    Hidradenitis axillaris    Type 2 diabetes mellitus without complication, with long-term current use of insulin ([facility])    Class 1 obesity due to excess calories with serious comorbidity and body mass index (BMI) of 33.0 to 33.9 in adult    Facial numbness    Scar tissue    Mixed hyperlipidemia     Past Surgical History:   Procedure Laterality Date    BREAST REDUCTION SURGERY Bilateral 2009

## 2025-07-10 ENCOUNTER — TELEPHONE (OUTPATIENT)
Dept: ENDOCRINOLOGY | Age: 55
End: 2025-07-10

## 2025-07-22 DIAGNOSIS — Z79.4 TYPE 2 DIABETES MELLITUS WITHOUT COMPLICATION, WITH LONG-TERM CURRENT USE OF INSULIN (HCC): ICD-10-CM

## 2025-07-22 DIAGNOSIS — E11.9 TYPE 2 DIABETES MELLITUS WITHOUT COMPLICATION, WITH LONG-TERM CURRENT USE OF INSULIN (HCC): ICD-10-CM
